# Patient Record
Sex: MALE | Race: WHITE | NOT HISPANIC OR LATINO | URBAN - METROPOLITAN AREA
[De-identification: names, ages, dates, MRNs, and addresses within clinical notes are randomized per-mention and may not be internally consistent; named-entity substitution may affect disease eponyms.]

---

## 2020-02-18 ENCOUNTER — INPATIENT (INPATIENT)
Facility: HOSPITAL | Age: 40
LOS: 20 days | Discharge: NURSING, REHAB OR LONG-TERM CARE | End: 2020-03-10
Attending: HOSPITALIST | Admitting: HOSPITALIST
Payer: MEDICAID

## 2020-02-18 ENCOUNTER — EMERGENCY (EMERGENCY)
Facility: HOSPITAL | Age: 40
LOS: 1 days | Discharge: ANOTHER HOSPITAL | End: 2020-02-18
Attending: EMERGENCY MEDICINE
Payer: MEDICAID

## 2020-02-18 VITALS
WEIGHT: 179.9 LBS | HEIGHT: 67 IN | HEART RATE: 78 BPM | DIASTOLIC BLOOD PRESSURE: 131 MMHG | RESPIRATION RATE: 18 BRPM | SYSTOLIC BLOOD PRESSURE: 157 MMHG | OXYGEN SATURATION: 99 %

## 2020-02-18 VITALS
HEART RATE: 84 BPM | OXYGEN SATURATION: 100 % | TEMPERATURE: 99 F | RESPIRATION RATE: 20 BRPM | SYSTOLIC BLOOD PRESSURE: 164 MMHG | DIASTOLIC BLOOD PRESSURE: 110 MMHG

## 2020-02-18 VITALS
SYSTOLIC BLOOD PRESSURE: 140 MMHG | HEART RATE: 97 BPM | TEMPERATURE: 99 F | RESPIRATION RATE: 18 BRPM | OXYGEN SATURATION: 100 % | WEIGHT: 189.82 LBS | DIASTOLIC BLOOD PRESSURE: 93 MMHG

## 2020-02-18 DIAGNOSIS — Z98.890 OTHER SPECIFIED POSTPROCEDURAL STATES: Chronic | ICD-10-CM

## 2020-02-18 DIAGNOSIS — I62.9 NONTRAUMATIC INTRACRANIAL HEMORRHAGE, UNSPECIFIED: ICD-10-CM

## 2020-02-18 LAB
ALBUMIN SERPL ELPH-MCNC: 3.9 G/DL — SIGNIFICANT CHANGE UP (ref 3.5–5)
ALBUMIN SERPL ELPH-MCNC: 4.9 G/DL — SIGNIFICANT CHANGE UP (ref 3.3–5)
ALP SERPL-CCNC: 126 U/L — HIGH (ref 40–120)
ALP SERPL-CCNC: 136 U/L — HIGH (ref 40–120)
ALT FLD-CCNC: 26 U/L — SIGNIFICANT CHANGE UP (ref 4–41)
ALT FLD-CCNC: 33 U/L DA — SIGNIFICANT CHANGE UP (ref 10–60)
ANION GAP SERPL CALC-SCNC: 15 MMO/L — HIGH (ref 7–14)
ANION GAP SERPL CALC-SCNC: 5 MMOL/L — SIGNIFICANT CHANGE UP (ref 5–17)
APAP SERPL-MCNC: < 15 UG/ML — LOW (ref 15–25)
APTT BLD: 34.8 SEC — SIGNIFICANT CHANGE UP (ref 27.5–36.3)
APTT BLD: 38.2 SEC — HIGH (ref 27.5–36.3)
AST SERPL-CCNC: 13 U/L — SIGNIFICANT CHANGE UP (ref 10–40)
AST SERPL-CCNC: 23 U/L — SIGNIFICANT CHANGE UP (ref 4–40)
BASE EXCESS BLDV CALC-SCNC: 2.7 MMOL/L — SIGNIFICANT CHANGE UP
BASOPHILS # BLD AUTO: 0.03 K/UL — SIGNIFICANT CHANGE UP (ref 0–0.2)
BASOPHILS # BLD AUTO: 0.03 K/UL — SIGNIFICANT CHANGE UP (ref 0–0.2)
BASOPHILS NFR BLD AUTO: 0.4 % — SIGNIFICANT CHANGE UP (ref 0–2)
BASOPHILS NFR BLD AUTO: 0.4 % — SIGNIFICANT CHANGE UP (ref 0–2)
BILIRUB SERPL-MCNC: 0.3 MG/DL — SIGNIFICANT CHANGE UP (ref 0.2–1.2)
BILIRUB SERPL-MCNC: 0.8 MG/DL — SIGNIFICANT CHANGE UP (ref 0.2–1.2)
BLOOD GAS VENOUS - CREATININE: 0.98 MG/DL — SIGNIFICANT CHANGE UP (ref 0.5–1.3)
BLOOD GAS VENOUS - FIO2: 21 — SIGNIFICANT CHANGE UP
BUN SERPL-MCNC: 12 MG/DL — SIGNIFICANT CHANGE UP (ref 7–18)
BUN SERPL-MCNC: 12 MG/DL — SIGNIFICANT CHANGE UP (ref 7–23)
CALCIUM SERPL-MCNC: 10.9 MG/DL — HIGH (ref 8.4–10.5)
CALCIUM SERPL-MCNC: 9.4 MG/DL — SIGNIFICANT CHANGE UP (ref 8.4–10.5)
CHLORIDE BLDV-SCNC: 105 MMOL/L — SIGNIFICANT CHANGE UP (ref 96–108)
CHLORIDE SERPL-SCNC: 101 MMOL/L — SIGNIFICANT CHANGE UP (ref 98–107)
CHLORIDE SERPL-SCNC: 107 MMOL/L — SIGNIFICANT CHANGE UP (ref 96–108)
CO2 SERPL-SCNC: 22 MMOL/L — SIGNIFICANT CHANGE UP (ref 22–31)
CO2 SERPL-SCNC: 28 MMOL/L — SIGNIFICANT CHANGE UP (ref 22–31)
CREAT SERPL-MCNC: 0.96 MG/DL — SIGNIFICANT CHANGE UP (ref 0.5–1.3)
CREAT SERPL-MCNC: 1.07 MG/DL — SIGNIFICANT CHANGE UP (ref 0.5–1.3)
EOSINOPHIL # BLD AUTO: 0.11 K/UL — SIGNIFICANT CHANGE UP (ref 0–0.5)
EOSINOPHIL # BLD AUTO: 0.18 K/UL — SIGNIFICANT CHANGE UP (ref 0–0.5)
EOSINOPHIL NFR BLD AUTO: 1.3 % — SIGNIFICANT CHANGE UP (ref 0–6)
EOSINOPHIL NFR BLD AUTO: 2.3 % — SIGNIFICANT CHANGE UP (ref 0–6)
ETHANOL BLD-MCNC: < 10 MG/DL — SIGNIFICANT CHANGE UP
GAS PNL BLDV: 140 MMOL/L — SIGNIFICANT CHANGE UP (ref 136–146)
GLUCOSE BLDV-MCNC: 113 MG/DL — HIGH (ref 70–99)
GLUCOSE SERPL-MCNC: 116 MG/DL — HIGH (ref 70–99)
GLUCOSE SERPL-MCNC: 99 MG/DL — SIGNIFICANT CHANGE UP (ref 70–99)
HBA1C BLD-MCNC: 4.9 % — SIGNIFICANT CHANGE UP (ref 4–5.6)
HCO3 BLDV-SCNC: 27 MMOL/L — SIGNIFICANT CHANGE UP (ref 20–27)
HCT VFR BLD CALC: 36.2 % — LOW (ref 39–50)
HCT VFR BLD CALC: 41.4 % — SIGNIFICANT CHANGE UP (ref 39–50)
HCT VFR BLDV CALC: 45.9 % — SIGNIFICANT CHANGE UP (ref 39–51)
HGB BLD-MCNC: 12.7 G/DL — LOW (ref 13–17)
HGB BLD-MCNC: 14.9 G/DL — SIGNIFICANT CHANGE UP (ref 13–17)
HGB BLDV-MCNC: 15 G/DL — SIGNIFICANT CHANGE UP (ref 13–17)
IMM GRANULOCYTES NFR BLD AUTO: 0.2 % — SIGNIFICANT CHANGE UP (ref 0–1.5)
IMM GRANULOCYTES NFR BLD AUTO: 0.8 % — SIGNIFICANT CHANGE UP (ref 0–1.5)
INR BLD: 0.98 — SIGNIFICANT CHANGE UP (ref 0.88–1.17)
INR BLD: 1.06 RATIO — SIGNIFICANT CHANGE UP (ref 0.88–1.16)
LACTATE BLDV-MCNC: 1.9 MMOL/L — SIGNIFICANT CHANGE UP (ref 0.5–2)
LYMPHOCYTES # BLD AUTO: 1.39 K/UL — SIGNIFICANT CHANGE UP (ref 1–3.3)
LYMPHOCYTES # BLD AUTO: 16.9 % — SIGNIFICANT CHANGE UP (ref 13–44)
LYMPHOCYTES # BLD AUTO: 2.09 K/UL — SIGNIFICANT CHANGE UP (ref 1–3.3)
LYMPHOCYTES # BLD AUTO: 26.8 % — SIGNIFICANT CHANGE UP (ref 13–44)
MCHC RBC-ENTMCNC: 30 PG — SIGNIFICANT CHANGE UP (ref 27–34)
MCHC RBC-ENTMCNC: 30 PG — SIGNIFICANT CHANGE UP (ref 27–34)
MCHC RBC-ENTMCNC: 35.1 GM/DL — SIGNIFICANT CHANGE UP (ref 32–36)
MCHC RBC-ENTMCNC: 36 % — SIGNIFICANT CHANGE UP (ref 32–36)
MCV RBC AUTO: 83.5 FL — SIGNIFICANT CHANGE UP (ref 80–100)
MCV RBC AUTO: 85.4 FL — SIGNIFICANT CHANGE UP (ref 80–100)
MONOCYTES # BLD AUTO: 0.28 K/UL — SIGNIFICANT CHANGE UP (ref 0–0.9)
MONOCYTES # BLD AUTO: 0.36 K/UL — SIGNIFICANT CHANGE UP (ref 0–0.9)
MONOCYTES NFR BLD AUTO: 3.4 % — SIGNIFICANT CHANGE UP (ref 2–14)
MONOCYTES NFR BLD AUTO: 4.6 % — SIGNIFICANT CHANGE UP (ref 2–14)
NEUTROPHILS # BLD AUTO: 5.07 K/UL — SIGNIFICANT CHANGE UP (ref 1.8–7.4)
NEUTROPHILS # BLD AUTO: 6.39 K/UL — SIGNIFICANT CHANGE UP (ref 1.8–7.4)
NEUTROPHILS NFR BLD AUTO: 65.1 % — SIGNIFICANT CHANGE UP (ref 43–77)
NEUTROPHILS NFR BLD AUTO: 77.8 % — HIGH (ref 43–77)
NRBC # BLD: 0 /100 WBCS — SIGNIFICANT CHANGE UP (ref 0–0)
NRBC # FLD: 0 K/UL — SIGNIFICANT CHANGE UP (ref 0–0)
PCO2 BLDV: 35 MMHG — LOW (ref 41–51)
PH BLDV: 7.48 PH — HIGH (ref 7.32–7.43)
PLATELET # BLD AUTO: 244 K/UL — SIGNIFICANT CHANGE UP (ref 150–400)
PLATELET # BLD AUTO: 264 K/UL — SIGNIFICANT CHANGE UP (ref 150–400)
PMV BLD: 10.1 FL — SIGNIFICANT CHANGE UP (ref 7–13)
PO2 BLDV: 74 MMHG — HIGH (ref 35–40)
POTASSIUM BLDV-SCNC: 4 MMOL/L — SIGNIFICANT CHANGE UP (ref 3.4–4.5)
POTASSIUM SERPL-MCNC: 3.7 MMOL/L — SIGNIFICANT CHANGE UP (ref 3.5–5.3)
POTASSIUM SERPL-MCNC: 3.9 MMOL/L — SIGNIFICANT CHANGE UP (ref 3.5–5.3)
POTASSIUM SERPL-SCNC: 3.7 MMOL/L — SIGNIFICANT CHANGE UP (ref 3.5–5.3)
POTASSIUM SERPL-SCNC: 3.9 MMOL/L — SIGNIFICANT CHANGE UP (ref 3.5–5.3)
PROT SERPL-MCNC: 7.8 G/DL — SIGNIFICANT CHANGE UP (ref 6–8.3)
PROT SERPL-MCNC: 8.6 G/DL — HIGH (ref 6–8.3)
PROTHROM AB SERPL-ACNC: 11.2 SEC — SIGNIFICANT CHANGE UP (ref 9.8–13.1)
PROTHROM AB SERPL-ACNC: 11.8 SEC — SIGNIFICANT CHANGE UP (ref 10–12.9)
RBC # BLD: 4.24 M/UL — SIGNIFICANT CHANGE UP (ref 4.2–5.8)
RBC # BLD: 4.96 M/UL — SIGNIFICANT CHANGE UP (ref 4.2–5.8)
RBC # FLD: 13.3 % — SIGNIFICANT CHANGE UP (ref 10.3–14.5)
RBC # FLD: 13.5 % — SIGNIFICANT CHANGE UP (ref 10.3–14.5)
SALICYLATES SERPL-MCNC: < 5 MG/DL — LOW (ref 15–30)
SAO2 % BLDV: 96 % — HIGH (ref 60–85)
SODIUM SERPL-SCNC: 138 MMOL/L — SIGNIFICANT CHANGE UP (ref 135–145)
SODIUM SERPL-SCNC: 140 MMOL/L — SIGNIFICANT CHANGE UP (ref 135–145)
TROPONIN I SERPL-MCNC: <0.015 NG/ML — SIGNIFICANT CHANGE UP (ref 0–0.04)
TSH SERPL-MCNC: 4.69 UIU/ML — HIGH (ref 0.27–4.2)
WBC # BLD: 7.79 K/UL — SIGNIFICANT CHANGE UP (ref 3.8–10.5)
WBC # BLD: 8.22 K/UL — SIGNIFICANT CHANGE UP (ref 3.8–10.5)
WBC # FLD AUTO: 7.79 K/UL — SIGNIFICANT CHANGE UP (ref 3.8–10.5)
WBC # FLD AUTO: 8.22 K/UL — SIGNIFICANT CHANGE UP (ref 3.8–10.5)

## 2020-02-18 PROCEDURE — 70496 CT ANGIOGRAPHY HEAD: CPT | Mod: 26

## 2020-02-18 PROCEDURE — 70496 CT ANGIOGRAPHY HEAD: CPT

## 2020-02-18 PROCEDURE — 31500 INSERT EMERGENCY AIRWAY: CPT

## 2020-02-18 PROCEDURE — 71045 X-RAY EXAM CHEST 1 VIEW: CPT

## 2020-02-18 PROCEDURE — 99292 CRITICAL CARE ADDL 30 MIN: CPT | Mod: 25

## 2020-02-18 PROCEDURE — 86900 BLOOD TYPING SEROLOGIC ABO: CPT

## 2020-02-18 PROCEDURE — 71045 X-RAY EXAM CHEST 1 VIEW: CPT | Mod: 26,77

## 2020-02-18 PROCEDURE — 86850 RBC ANTIBODY SCREEN: CPT

## 2020-02-18 PROCEDURE — 99222 1ST HOSP IP/OBS MODERATE 55: CPT

## 2020-02-18 PROCEDURE — 93005 ELECTROCARDIOGRAM TRACING: CPT

## 2020-02-18 PROCEDURE — 99291 CRITICAL CARE FIRST HOUR: CPT | Mod: 25

## 2020-02-18 PROCEDURE — 71045 X-RAY EXAM CHEST 1 VIEW: CPT | Mod: 26

## 2020-02-18 PROCEDURE — 85027 COMPLETE CBC AUTOMATED: CPT

## 2020-02-18 PROCEDURE — 36415 COLL VENOUS BLD VENIPUNCTURE: CPT

## 2020-02-18 PROCEDURE — 70498 CT ANGIOGRAPHY NECK: CPT | Mod: 26

## 2020-02-18 PROCEDURE — 99233 SBSQ HOSP IP/OBS HIGH 50: CPT

## 2020-02-18 PROCEDURE — 70450 CT HEAD/BRAIN W/O DYE: CPT | Mod: 26,59,77

## 2020-02-18 PROCEDURE — 70450 CT HEAD/BRAIN W/O DYE: CPT

## 2020-02-18 PROCEDURE — 86901 BLOOD TYPING SEROLOGIC RH(D): CPT

## 2020-02-18 PROCEDURE — 99291 CRITICAL CARE FIRST HOUR: CPT

## 2020-02-18 PROCEDURE — 84484 ASSAY OF TROPONIN QUANT: CPT

## 2020-02-18 PROCEDURE — 85610 PROTHROMBIN TIME: CPT

## 2020-02-18 PROCEDURE — 85730 THROMBOPLASTIN TIME PARTIAL: CPT

## 2020-02-18 PROCEDURE — 70498 CT ANGIOGRAPHY NECK: CPT

## 2020-02-18 PROCEDURE — 80053 COMPREHEN METABOLIC PANEL: CPT

## 2020-02-18 RX ORDER — SODIUM CHLORIDE 5 G/100ML
500 INJECTION, SOLUTION INTRAVENOUS
Refills: 0 | Status: DISCONTINUED | OUTPATIENT
Start: 2020-02-18 | End: 2020-02-21

## 2020-02-18 RX ORDER — NICARDIPINE HYDROCHLORIDE 30 MG/1
5 CAPSULE, EXTENDED RELEASE ORAL
Qty: 40 | Refills: 0 | Status: DISCONTINUED | OUTPATIENT
Start: 2020-02-18 | End: 2020-03-04

## 2020-02-18 RX ORDER — PROPOFOL 10 MG/ML
50 INJECTION, EMULSION INTRAVENOUS
Qty: 1000 | Refills: 0 | Status: DISCONTINUED | OUTPATIENT
Start: 2020-02-18 | End: 2020-02-19

## 2020-02-18 RX ORDER — CHLORHEXIDINE GLUCONATE 213 G/1000ML
1 SOLUTION TOPICAL
Refills: 0 | Status: DISCONTINUED | OUTPATIENT
Start: 2020-02-18 | End: 2020-02-24

## 2020-02-18 RX ORDER — PROPOFOL 10 MG/ML
40 INJECTION, EMULSION INTRAVENOUS
Qty: 1000 | Refills: 0 | Status: DISCONTINUED | OUTPATIENT
Start: 2020-02-18 | End: 2020-03-04

## 2020-02-18 RX ORDER — CHLORHEXIDINE GLUCONATE 213 G/1000ML
15 SOLUTION TOPICAL EVERY 12 HOURS
Refills: 0 | Status: DISCONTINUED | OUTPATIENT
Start: 2020-02-18 | End: 2020-02-20

## 2020-02-18 RX ORDER — LEVETIRACETAM 250 MG/1
1000 TABLET, FILM COATED ORAL ONCE
Refills: 0 | Status: COMPLETED | OUTPATIENT
Start: 2020-02-18 | End: 2020-02-18

## 2020-02-18 RX ORDER — NICARDIPINE HYDROCHLORIDE 30 MG/1
5 CAPSULE, EXTENDED RELEASE ORAL
Qty: 40 | Refills: 0 | Status: DISCONTINUED | OUTPATIENT
Start: 2020-02-18 | End: 2020-02-19

## 2020-02-18 RX ORDER — ETOMIDATE 2 MG/ML
20 INJECTION INTRAVENOUS ONCE
Refills: 0 | Status: COMPLETED | OUTPATIENT
Start: 2020-02-18 | End: 2020-02-18

## 2020-02-18 RX ORDER — PROPOFOL 10 MG/ML
40 INJECTION, EMULSION INTRAVENOUS
Qty: 500 | Refills: 0 | Status: DISCONTINUED | OUTPATIENT
Start: 2020-02-18 | End: 2020-02-18

## 2020-02-18 RX ORDER — LABETALOL HCL 100 MG
10 TABLET ORAL ONCE
Refills: 0 | Status: COMPLETED | OUTPATIENT
Start: 2020-02-18 | End: 2020-02-18

## 2020-02-18 RX ORDER — PROPOFOL 10 MG/ML
20 INJECTION, EMULSION INTRAVENOUS ONCE
Refills: 0 | Status: COMPLETED | OUTPATIENT
Start: 2020-02-18 | End: 2020-02-18

## 2020-02-18 RX ORDER — SUCCINYLCHOLINE CHLORIDE 100 MG/5ML
100 SYRINGE (ML) INTRAVENOUS ONCE
Refills: 0 | Status: COMPLETED | OUTPATIENT
Start: 2020-02-18 | End: 2020-02-18

## 2020-02-18 RX ORDER — FENTANYL CITRATE 50 UG/ML
100 INJECTION INTRAVENOUS ONCE
Refills: 0 | Status: DISCONTINUED | OUTPATIENT
Start: 2020-02-18 | End: 2020-02-18

## 2020-02-18 RX ORDER — PROPOFOL 10 MG/ML
40 INJECTION, EMULSION INTRAVENOUS
Qty: 1000 | Refills: 0 | Status: DISCONTINUED | OUTPATIENT
Start: 2020-02-18 | End: 2020-02-18

## 2020-02-18 RX ORDER — LEVETIRACETAM 250 MG/1
500 TABLET, FILM COATED ORAL EVERY 12 HOURS
Refills: 0 | Status: DISCONTINUED | OUTPATIENT
Start: 2020-02-18 | End: 2020-02-20

## 2020-02-18 RX ADMIN — NICARDIPINE HYDROCHLORIDE 25 MG/HR: 30 CAPSULE, EXTENDED RELEASE ORAL at 23:23

## 2020-02-18 RX ADMIN — PROPOFOL 20.22 MICROGRAM(S)/KG/MIN: 10 INJECTION, EMULSION INTRAVENOUS at 23:23

## 2020-02-18 RX ADMIN — PROPOFOL 20 MILLIGRAM(S): 10 INJECTION, EMULSION INTRAVENOUS at 16:29

## 2020-02-18 RX ADMIN — PROPOFOL 20.66 MICROGRAM(S)/KG/MIN: 10 INJECTION, EMULSION INTRAVENOUS at 19:46

## 2020-02-18 RX ADMIN — CHLORHEXIDINE GLUCONATE 15 MILLILITER(S): 213 SOLUTION TOPICAL at 23:24

## 2020-02-18 RX ADMIN — Medication 4 MILLIGRAM(S): at 15:45

## 2020-02-18 RX ADMIN — ETOMIDATE 20 MILLIGRAM(S): 2 INJECTION INTRAVENOUS at 15:22

## 2020-02-18 RX ADMIN — NICARDIPINE HYDROCHLORIDE 25 MG/HR: 30 CAPSULE, EXTENDED RELEASE ORAL at 19:46

## 2020-02-18 RX ADMIN — Medication 10 MILLIGRAM(S): at 15:19

## 2020-02-18 RX ADMIN — Medication 100 MILLIGRAM(S): at 15:24

## 2020-02-18 RX ADMIN — NICARDIPINE HYDROCHLORIDE 25 MG/HR: 30 CAPSULE, EXTENDED RELEASE ORAL at 16:02

## 2020-02-18 RX ADMIN — Medication 2 MILLIGRAM(S): at 15:42

## 2020-02-18 RX ADMIN — SODIUM CHLORIDE 30 MILLILITER(S): 5 INJECTION, SOLUTION INTRAVENOUS at 23:23

## 2020-02-18 RX ADMIN — LEVETIRACETAM 400 MILLIGRAM(S): 250 TABLET, FILM COATED ORAL at 16:29

## 2020-02-18 RX ADMIN — FENTANYL CITRATE 100 MICROGRAM(S): 50 INJECTION INTRAVENOUS at 15:37

## 2020-02-18 RX ADMIN — PROPOFOL 19.58 MICROGRAM(S)/KG/MIN: 10 INJECTION, EMULSION INTRAVENOUS at 15:50

## 2020-02-18 RX ADMIN — Medication 4 MILLIGRAM(S): at 16:28

## 2020-02-18 NOTE — H&P ADULT - ATTENDING COMMENTS
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  He was intubated at OSH 2/2 decreased LOC and ?seizure described by EMS.  Transferred to [facility] for vEEG and neuro eval.  Seen by neuro and NS, repeat head CT without change, admitted to MICU for BP control on nicardipine gtt to maintain sBP<160,, 3%NaCl to keep Na 145-150, and vent management.  He is on propofol and received Keppra. Neuro exam limited 2/2 sedation but PERRL, +gag  Lungs clear, Cor RRR s1s2, Abd soft   vent a/c 18/18, 450/30% +5  Pplat 17  CTA without arterial path    Acute intraparenchymal hemorrhage, ?seizure, intubated for airway protection, no expansion of bleed on f/u CT  - ABG on current vent settings, maintain lung protective vent settings, consider wean of sedation to assess neurologic status  - nicardipine to maintain sBP <160  - 3%NaCl to keep Na 145-150  q 4-6h BMP  - vEEG, continue keppra  - f/u CT at 24hours  - try to obtain records from [place] re: cva and w/u  very guarded  cc time 45 min excluding time spent on procedures and POCUS

## 2020-02-18 NOTE — H&P ADULT - HISTORY OF PRESENT ILLNESS
41 yo M c PMH of HTN and ?ICH in September 2019 (with residual LUE and LLE weakness) transferred from  where he presented with AMS and unresponsiveness and was intubated, pt also had 39 yo M c PMH of HTN and ?ICH in September 2019 (with residual LUE and LLE weakness) transferred from  where he presented with AMS and unresponsiveness. per father, pt was his normal self (A&Ox3, talking normally, had LUE and LLE weakness that was at his baseline) all morning aside from having a HA this morning. At 1:15PM, pt told his father he had severe dizziness, then pt could not move his LUE and LLE at all (worse than baseline), then lost consciousness. father denies seizure activity. LKN 1:10PM today. At , code stroke was called on arrival, NIHSS 41, pt was obtunded and intubated. CT showed: "acute parenchymal hematoma in the left thalamus. It measures approximately 1.5 cm in diameter with mild associated vasogenic edema. There is mild left-sided intraventricular hemorrhage. There is no ventricular trapping at this time. There is moderate to advanced right basal ganglia and to a laminectomy encephalomalacia consistent with previous infarct or hemorrhage with an associated craniotomy defect." no tPA given. pt's obtunded state was OOP to extent of his bleed and per  note pt had LLE shaking, so seizure was presumed and pt loaded with keppra, given ativan, started on propofol drip, and sent to [facility] ED. not on bloodthinners. no recent travel/ill contacts. Father states pt had intracranial bleeding in September and pt had neurosurgery but father isn't sure what kind. no FH of kidney disease, ICH, SLE.      meds given: 20 etomidate, 100 mcg fentanyl, labetalol 10 mg, nicardipine drip, keppra 1000 mg, ativan 10 mg, propofol drip, succinylcholine  in [facility] ED, pt assessed by neurosurgery and neuro, repeat CTH was relatively stable, so pt cleared for admission to MICU. was continued on propofol and nicardipine.     home meds:  labetalol 200 mg BID  amitriptyline HCl 50 mg q day  amlodipine 10 mg q day  lipitor 10 mg q day 41 yo M c PMH of HTN and ?ICH in September 2019 (with residual LUE and LLE weakness) transferred from  where he presented with AMS and unresponsiveness. per father, pt was his normal self (A&Ox3, talking normally, had LUE and LLE weakness that was at his baseline) all morning aside from having a HA this morning. At 1:15PM, pt told his father he had severe dizziness, then pt could not move his LUE and LLE at all (worse than baseline), then lost consciousness. father denies seizure activity. LKN 1:10PM today. At , code stroke was called on arrival, NIHSS 41, pt was obtunded and intubated. CT showed: "acute parenchymal hematoma in the left thalamus. It measures approximately 1.5 cm in diameter with mild associated vasogenic edema. There is mild left-sided intraventricular hemorrhage. There is no ventricular trapping at this time. There is moderate to advanced right basal ganglia and to a laminectomy encephalomalacia consistent with previous infarct or hemorrhage with an associated craniotomy defect." CTA head and neck showed "normal arterial findings." no tPA given. pt's obtunded state was OOP to extent of his bleed and per  note pt had LLE shaking, so seizure was presumed and pt loaded with keppra, given ativan, started on propofol drip, and sent to [facility] ED. not on bloodthinners. no recent travel/ill contacts. Father states pt had intracranial bleeding in September and pt had neurosurgery but father isn't sure what kind. [facility]. no FH of kidney disease, ICH, SLE.      meds given: 20 etomidate, 100 mcg fentanyl, labetalol 10 mg, nicardipine drip, keppra 1000 mg, ativan 10 mg, propofol drip, succinylcholine  in [facility] ED, pt assessed by neurosurgery and neuro, repeat CTH was relatively stable, so pt cleared for admission to MICU. was continued on propofol and nicardipine.     home meds:  labetalol 200 mg BID  amitriptyline HCl 50 mg q day  amlodipine 10 mg q day  lipitor 10 mg q day

## 2020-02-18 NOTE — H&P ADULT - ASSESSMENT
41 yo M c PMH of HTN and ?ICH in September 2019 (with residual LUE and LLE weakness) transferred from  where he presented with AMS and unresponsiveness, that was preceded by dizziness, HA, and LUE and LLE weakness. code stroke called which found ICH, pt intubated also had LLE shaking so was loaded with keppra and started on propofol, and nicardipine and transferred. repeat CTH in [facility] ED was stable.     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 E(1) V(NT) M(4) on sedation. +gag reflex.   - repeat 24h CTH  - keppra 500 mg bid  - Strict BP control, Goal SBP <160  - q1h neuro checks  - Goal Na 145-150  -  video EEG  - MRI brain w, w/o when able  - obtain hx of prior stroke (not in system)- if unable to obtain will need TTE with bubble, hypercoaguable workup, venous dopplers of the lower extremities.   - f/u neuro and neurosurgery recs     CARDIAC: h/o HTN.   - c/w cardine drip goal SBP < 160    RESPIRATORY: ETT (confirmed on [facility] CXR)  - c/w vent    GI: no acute issues    ENDO: no acute issues  - SSI  - f/u A1c     RENAL:   - Goal Na 145-150    HEME/ONC;   - hold chemical dvt ppx for now     PPX: SCDs 41 yo M c PMH of HTN and ?ICH in September 2019 (with residual LUE and LLE weakness) transferred from  where he presented with AMS and unresponsiveness, that was preceded by dizziness, HA, and LUE and LLE weakness. code stroke called which found ICH, pt intubated also had LLE shaking so was loaded with keppra and started on propofol, and nicardipine and transferred. repeat CTH in [facility] ED was stable.     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 E(1) V(NT) M(4) on sedation. +gag reflex.   - repeat 24h CTH  - elevated HOB 30 degrees   - keppra 500 mg bid  - Strict BP control, Goal SBP <160  - q1h neuro checks  - Goal Na 145-150  -  video EEG  - MRI brain w, w/o when able  - obtain hx of prior stroke from family/?[place] (not in system)- if unable to obtain will need TTE with bubble, hypercoaguable workup, venous dopplers of the lower extremities.   - f/u neuro and neurosurgery recs     CARDIAC: h/o HTN.   - c/w cardine drip goal SBP < 160    RESPIRATORY: ETT (confirmed on [facility] CXR)  - c/w vent    GI: no acute issues    ENDO: no acute issues  - SSI  - f/u A1c     RENAL: Goal Na 145-150  - hypertonic saline 3% started at 30 cc/hr for Na 138  - BMP q4h    HEME/ONC:  - hold chemical dvt ppx for now     PPX: SCDs

## 2020-02-18 NOTE — CONSULT NOTE ADULT - SUBJECTIVE AND OBJECTIVE BOX
HPI:  [facility] MRN: [id number]  History obtained from father at bedside, pt is intubated and sedated on propofol  Patient is a 40 year old male with PMHx of prior R BG infarct (with R on CT head at [facility]) who presents as a transfer from [facility] for L thalamic bleed (approx 1.5 cm on 2 pm 2/18 scan with vasogenic edema) with IVH, CTA H&N unremarkable. He has a persistent L sided deficit at baseline, and lives with father. On day of presentation father noted that pt was not talking and became obtunded 5 min before EMS arrived.     General:  Limited exam due to intubation and sedation  CN: pupils 2-3mm, sluggishly reactive, EOM - VOR intact, gag intact,     LABORATORY:  CBC   Chem       LFTs   Coagulopathy   Lipid Panel   A1c   Cardiac enzymes     U/A HPI:  [facility] MRN: [id number]  History obtained from father at bedside, pt is intubated and sedated on propofol  Patient is a 40 year old male with PMHx of prior R BG infarct (with R on CT head at [facility]), HTN who presents as a transfer from [facility] for L thalamic bleed (approx 1.5 cm on 2 pm 2/18 scan with vasogenic edema) with IVH, CTA H&N unremarkable. He has a persistent L sided deficit at baseline, and lives with father. On day of presentation father noted that pt was not talking and became obtunded 5 min before EMS arrived. Prior to arrival to [facility], he was witnessed to have 1 GTC, and was intubated after. He is not on AC or antiplt, and home meds include lisinopril, labetalol and amlodipine. No recent illnesses, no trauma. He was transferred to [facility] (no bed availability at [facility]) for vEEG and further management  ICH score 3, MRS unknown.     General:  Limited exam due to intubation and sedation  CN: pupils 2-3mm, sluggishly reactive, EOM - VOR intact, gag intact, no corneals  Motor: LUE decerebrate posturing, RUE extension, RLE, LLE both intermittently withdrawing, with LLE tremor appreciated only with painful stim (pinch under L knee), otherwise no shaking noted.   R upgoing toe, L toe mute    Labs: pending

## 2020-02-18 NOTE — ED PROVIDER NOTE - UNABLE TO OBTAIN
A complete HPI is unable to be obtained secondary to the patient's underlying condition Severe Illness/Injury A complete ROS is unable to be obtained secondary to the patient's underlying condition

## 2020-02-18 NOTE — ED PROCEDURE NOTE - CPROC ED INFORMED CONSENT1
How Severe Is Your Skin Lesion?: mild
Has Your Skin Lesion Been Treated?: not been treated
Is This A New Presentation, Or A Follow-Up?: Skin Lesion
obtained from patient's Father/Benefits, risks, and possible complications of procedure explained to patient/caregiver who verbalized understanding and gave verbal consent.

## 2020-02-18 NOTE — ED PROVIDER NOTE - PROGRESS NOTE DETAILS
Patient was intubated for expected course and airway management. On review of CT scan, the size of the bleed would not represent cause for the patient being this obtunded. I suspect the seizures are also going on. Patient was noted to have left leg shaking prior to intubation. I spent 22 on the line with the transfer center and discussed case with Dr. Proctor at [facility]. He states that there is a bed limitation at [facility] and it will be difficult to transfer the patient there. Dr. Proctor conferenced in with Dr. rGay at [facility] ICU who is currently requesting we keep the paitent in the ER here. Dr. Proctor will attempt to discuss the case with neurosurgery at [facility] as he believes the patient will benefit from continuous ECG monitoring and further management. Awaiting phone call back from transfer center. Pt currently remains obtunded, not moving 4 extremities, his gaze changed now to the left and cough on tracheal tube. Will repeat CT scan if have enough time. I suspect the bleed is expanding. Patient was intubated for expected course and airway management. On review of CT scan, the size of the bleed would not represent cause for the patient being this obtunded. I suspect the seizures are also going on. Patient was noted to have left leg shaking prior to intubation. I spent 22 on the line with the transfer center and discussed case with Dr. Proctor at [facility]. He states that there is a bed limitation at [facility] and it will be difficult to transfer the patient there. Dr. Proctor conferenced in with Dr. Gray at [facility] ICU who is currently requesting we keep the paitent in the ER here. Dr. Proctor will attempt to discuss the case with neurosurgery at [facility] as he believes the patient will benefit from continuous EEG monitoring and further management. Awaiting phone call back from transfer center. Pt currently remains obtunded, not moving 4 extremities, his gaze changed now to the left and cough on tracheal tube. Will repeat CT scan if have enough time. I suspect the bleed is expanding. Pt is accepted to transfer to [facility] ER to Dr. Bautista Patient with whole body tremors - given that we still think seizure, makes it more likely. Keppra and Ativan to be administered.

## 2020-02-18 NOTE — CONSULT NOTE ADULT - SUBJECTIVE AND OBJECTIVE BOX
HPI: 40 year old     PAST MEDICAL & SURGICAL HISTORY:    FAMILY HISTORY:    Home Medications:      MEDICATIONS  (STANDING):  chlorhexidine 0.12% Liquid 15 milliLiter(s) Oral Mucosa every 12 hours    MEDICATIONS  (PRN):    Vital Signs Last 24 Hrs  T(C): --  T(F): --  HR: --  BP: --  BP(mean): --  RR: --  SpO2: --    PHYSICAL EXAM:  Intubated     LABS:                RADIOLOGY: HPI:   39 yo male with PMhx of stroke with persistent left sided deficit but otherwise at normal baseline, lives with father and accompanied by father, BIBA, Patient's father noticed that patient was not talking and obtunded that occurred 5 minutes before EMS was called.   Transfeered from [place], intubated there    PAST MEDICAL & SURGICAL HISTORY:  NOTED TO HAVE RIGHT PARITAL CRANIOTOMY     FAMILY HISTORY:    Home Medications:      MEDICATIONS  (STANDING):  chlorhexidine 0.12% Liquid 15 milliLiter(s) Oral Mucosa every 12 hours    MEDICATIONS  (PRN):    Vital Signs Last 24 Hrs  T(C): --  T(F): --  HR: --  BP: --  BP(mean): --  RR: --  SpO2: --      PHYSICAL EXAM:  Intubated     LABS:                RADIOLOGY:      EXAM:  CT ANGIO NECK (W)AW IC                          EXAM:  CT ANGIO BRAIN (W)AW IC                            PROCEDURE DATE:  02/18/2020          INTERPRETATION:  CT angiogram head and neck with contrast    History intracranial hemorrhage    Contrast Omnipaque 90 cc; 10 cc discarded    Multiplanar MIPS included    Examination of the intracranial circulation is negative for large vessel occlusion or aneurysm. There are no abnormal draining or feeding vessels associated with the parenchymal hematoma noted in the left thalamus.    Examination of the cervical circulation is negative for carotid or vertebral artery stenosis, occlusion or dissection. The left vertebral artery is anatomically dominant.    IMPRESSION:    Normal arterial findings HPI:   39 yo male with PMhx of stroke with persistent left sided deficit but otherwise at normal baseline, lives with father and accompanied by father, BIBA, Patient's father noticed that patient was not talking and obtunded that occurred 5 minutes before EMS was called.   Transfeered from [place], intubated there    PAST MEDICAL & SURGICAL HISTORY:  NOTED TO HAVE RIGHT PARITAL CRANIOTOMY     FAMILY HISTORY:    Home Medications:      MEDICATIONS  (STANDING):  chlorhexidine 0.12% Liquid 15 milliLiter(s) Oral Mucosa every 12 hours    MEDICATIONS  (PRN):    Vital Signs Last 24 Hrs  T(C): --  T(F): --  HR: --  BP: --  BP(mean): --  RR: --  SpO2: --      PHYSICAL EXAM:  Intubated     LABS:                RADIOLOGY:  EXAM:  CT BRAIN STROKE PROTOCOL                            PROCEDURE DATE:  02/18/2020          INTERPRETATION:  CT brain without contrast    History CVA    There are no relevant prior studies for comparison. There is an acute parenchymal hematoma in the left thalamus. It measures approximately 1.5 cm in diameter with mild associated vasogenic edema. There is mild left-sided intraventricular hemorrhage. There is no ventricular trapping at this time. There is moderate to advanced right basal ganglia and to a laminectomy encephalomalacia consistent with previous infarct or hemorrhage with an associated craniotomy defect.    IMPRESSION:    Small acute parenchymal hematoma left thalamus and cyst with hemorrhagic lacunar infarct.    Findings discussed with Dr. Hines      EXAM:  CT ANGIO NECK (W)AW IC                          EXAM:  CT ANGIO BRAIN (W)AW IC                            PROCEDURE DATE:  02/18/2020          INTERPRETATION:  CT angiogram head and neck with contrast    History intracranial hemorrhage    Contrast Omnipaque 90 cc; 10 cc discarded    Multiplanar MIPS included    Examination of the intracranial circulation is negative for large vessel occlusion or aneurysm. There are no abnormal draining or feeding vessels associated with the parenchymal hematoma noted in the left thalamus.    Examination of the cervical circulation is negative for carotid or vertebral artery stenosis, occlusion or dissection. The left vertebral artery is anatomically dominant.    IMPRESSION:    Normal arterial findings HPI:   39 yo male with PMhx of stroke with persistent left sided deficit but otherwise at normal baseline, lives with father and accompanied by father, BIBA, Patient's father noticed that patient was not talking and obtunded that occurred 5 minutes before EMS was called.   Transfeered from [place], intubated there    PAST MEDICAL & SURGICAL HISTORY:  NOTED TO HAVE RIGHT PARITAL CRANIOTOMY     FAMILY HISTORY:    Home Medications:      MEDICATIONS  (STANDING):  chlorhexidine 0.12% Liquid 15 milliLiter(s) Oral Mucosa every 12 hours    MEDICATIONS  (PRN):    Vital Signs Last 24 Hrs  T(C): --  T(F): --  HR: --  BP: --  BP(mean): --  RR: --  SpO2: --      PHYSICAL EXAM:  Intubated   Pupild 2mm sluggish + corneal  ? Posutring UE  withdrawing LE  Not following commands    LABS:          RADIOLOGY:  EXAM:  CT BRAIN STROKE PROTOCOL                            PROCEDURE DATE:  02/18/2020          INTERPRETATION:  CT brain without contrast    History CVA    There are no relevant prior studies for comparison. There is an acute parenchymal hematoma in the left thalamus. It measures approximately 1.5 cm in diameter with mild associated vasogenic edema. There is mild left-sided intraventricular hemorrhage. There is no ventricular trapping at this time. There is moderate to advanced right basal ganglia and to a laminectomy encephalomalacia consistent with previous infarct or hemorrhage with an associated craniotomy defect.    IMPRESSION:    Small acute parenchymal hematoma left thalamus and cyst with hemorrhagic lacunar infarct.    Findings discussed with Dr. Hines      EXAM:  CT ANGIO NECK (W)AW IC                          EXAM:  CT ANGIO BRAIN (W)AW IC                            PROCEDURE DATE:  02/18/2020          INTERPRETATION:  CT angiogram head and neck with contrast    History intracranial hemorrhage    Contrast Omnipaque 90 cc; 10 cc discarded    Multiplanar MIPS included    Examination of the intracranial circulation is negative for large vessel occlusion or aneurysm. There are no abnormal draining or feeding vessels associated with the parenchymal hematoma noted in the left thalamus.    Examination of the cervical circulation is negative for carotid or vertebral artery stenosis, occlusion or dissection. The left vertebral artery is anatomically dominant.    IMPRESSION:    Normal arterial findings

## 2020-02-18 NOTE — ED PROVIDER NOTE - OBJECTIVE STATEMENT
39 yo male with PMhx of stroke with persistent left sided deficit but otherwise at normal baseline, lives with father and accompanied by father, BIBA, Patient's father noticed that patient was not talking and obtunded that occurred 5 minutes before EMS was called. The story is received from the patient's father who is Romanian speaking - EMS presents to translate due to urgency of care.

## 2020-02-18 NOTE — ED ADULT NURSE NOTE - NSIMPLEMENTINTERV_GEN_ALL_ED
Implemented All Fall with Harm Risk Interventions:  [place] to call system. Call bell, personal items and telephone within reach. Instruct patient to call for assistance. Room bathroom lighting operational. Non-slip footwear when patient is off stretcher. Physically safe environment: no spills, clutter or unnecessary equipment. Stretcher in lowest position, wheels locked, appropriate side rails in place. Provide visual cue, wrist band, yellow gown, etc. Monitor gait and stability. Monitor for mental status changes and reorient to person, place, and time. Review medications for side effects contributing to fall risk. Reinforce activity limits and safety measures with patient and family. Provide visual clues: red socks.

## 2020-02-18 NOTE — ED PROVIDER NOTE - ATTENDING CONTRIBUTION TO CARE
40m w hx CVA w residual left-sided weakness xfer'd from [place] for intracranial hemorrhage, specifically thalamic bleed w/ hemorrhagic lacunar infarct. Pt presented to OSH unresponsive w LKW 13:55. Was non-verbal and not moving extremities w/ reported decorticate posturing and was intubated for airway protection. During stay had witnessed GTC activity that was treated w 2 ativan pushes, Keppra and was then placed on propofol gtt w resolution. Was also placed on cardene drip for elevated bp. On exam: Patient intubated and vented with good breath sounds bilaterally, stable VS as noted. Responds to painful stimuli with withdrawal on R and posturing on left. Pupils 4mm bilat and reactive. Lungs clear heart sounds normal abdomen soft skin normal. IMP: Intracranial hemorrhage, Seizure, Intubated, BP control, sz treated, on propofol drip, 40m w hx CVA w residual left-sided weakness xfer'd from [place] for intracranial hemorrhage, specifically thalamic bleed w/ hemorrhagic lacunar infarct. Pt presented to OSH unresponsive w LKW 13:55. Was non-verbal and not moving extremities w/ reported decorticate posturing and was intubated for airway protection. During stay had witnessed GTC activity that was treated w 2 ativan pushes, Keppra and was then placed on propofol gtt w resolution. Was also placed on cardene drip for elevated bp. On exam: Patient intubated and vented with good breath sounds bilaterally, stable VS as noted. Responds to painful stimuli with withdrawal on R and posturing on left. Pupils 4mm bilat and reactive. Lungs clear heart sounds normal abdomen soft skin normal. IMP: Intracranial hemorrhage, Seizure, Intubated, BP control, sz treated, on propofol drip, Plan: Tube position reconfirmed, repeat labs, consulted Neuro, Neurosurg, MICU, repeat CT done per consultants. Admit MICU

## 2020-02-18 NOTE — ED PROVIDER NOTE - CONSTITUTIONAL, MLM
normal... eyes open, gazing to right, left arm clawing eyes open, gazing to right, left arm clawing. +distress

## 2020-02-18 NOTE — ED ADULT NURSE NOTE - OBJECTIVE STATEMENT
Pt BIBEMS c/o of nonverbal, unable to move all extremities since 1355, pt is alert awake flat effect no acute respiratory distress noted at this time see code stroke flow sheet

## 2020-02-18 NOTE — CONSULT NOTE ADULT - ASSESSMENT
40 year old male with PMHx of prior R BG infarct (with R on CT head at [facility]), HTN who presents as a transfer from [facility] for L thalamic bleed (approx 1.5 cm on 2 pm 2/18 scan with vasogenic edema) with IVH, CTA H&N unremarkable. He has a persistent L sided deficit at baseline, and lives with father. On day of presentation father noted that pt was not talking and became obtunded 5 min before EMS arrived. Prior to arrival to [facility], he was witnessed to have 1 GTC, and was intubated after. He is not on AC or antiplt, and home meds include lisinopril, labetalol and amlodipine. No recent illnesses, no trauma. He was transferred to [facility] (no bed availability at [facility]) for vEEG and further management  ICH score 3, MRS unknown.   Exam limited due to sedation on propofol, no over seizing noted, LLE shaking noted only with painful stim.   Impression: L thalamic IPH with IVH of unknown etiology, hypertensive vs malignancy vs other.     Plan  [] repeat CTh now given clinically poor exam in comparison to radiological findings, and repeat again in 24 hours  [] MRI brain w, w/o when able  [] q1 neuro checks  [] SBP goal < 160  [] keep hypernatremic, Na 145-155  [] for possible seizure - would continue propofol and start keppra 500 BID, and connect patient to video EEG monitoring  [] DVT ppx with venodynes, hold all blood thinners.   [] obtain hx of prior stroke (not in system)  [] stroke core measures: please send hemoglobin A1C and lipid profile 40 year old male with PMHx of prior R BG infarct (with R on CT head at [facility]), HTN who presents as a transfer from [facility] for L thalamic bleed (approx 1.5 cm on 2 pm 2/18 scan with vasogenic edema) with IVH, CTA H&N unremarkable. He has a persistent L sided deficit at baseline, and lives with father. On day of presentation father noted that pt was not talking and became obtunded 5 min before EMS arrived. Prior to arrival to [facility], he was witnessed to have 1 GTC, and was intubated after. He is not on AC or antiplt, and home meds include lisinopril, labetalol and amlodipine. No recent illnesses, no trauma. He was transferred to [facility] (no bed availability at [facility]) for vEEG and further management  ICH score 3, MRS unknown.   Exam limited due to sedation on propofol, no over seizing noted, LLE shaking noted only with painful stim.   Impression: L thalamic IPH with IVH of unknown etiology, hypertensive vs malignancy vs other.     Plan  [] repeat CTh now given clinically poor exam in comparison to radiological findings, and repeat again in 24 hours  [] MRI brain w, w/o when able  [] q1 neuro checks  [] SBP goal < 160  [] keep hypernatremic, Na 145-155  [] for possible seizure - would continue propofol and start keppra 500 BID, and connect patient to video EEG monitoring  [] DVT ppx with venodynes, hold all blood thinners.   [] obtain hx of prior stroke (not in system)- if unable to obtain will need TTE with bubble, hypercoaguable workup, venous dopplers of the lower extremities.   [] stroke core measures: please send hemoglobin A1C and lipid profile 40 year old male with PMHx of prior R BG infarct (with R on CT head at [facility]), HTN who presents as a transfer from [facility] for L thalamic bleed (approx 1.5 cm on 2 pm 2/18 scan with vasogenic edema) with IVH, CTA H&N unremarkable. He has a persistent L sided deficit at baseline, and lives with father. On day of presentation father noted that pt was not talking and became obtunded 5 min before EMS arrived. Prior to arrival to [facility], he was witnessed to have 1 GTC, and was intubated after. He is not on AC or antiplt, and home meds include lisinopril, labetalol and amlodipine. No recent illnesses, no trauma. He was transferred to [facility] (no bed availability at [facility]) for vEEG and further management  ICH score 3, MRS unknown.   Exam limited due to sedation on propofol, no over seizing noted, LLE shaking noted only with painful stim.   Impression: L thalamic IPH with IVH of unknown etiology, hypertensive vs malignancy vs other.     Plan:    [] repeat CTh now given clinically poor exam in comparison to radiological findings, and repeat again in 24 hours  [] MRI brain w, w/o when able  [] q1 neuro checks  [] SBP goal < 160  [] keep hypernatremic, Na 145-155  [] for possible seizure - would continue propofol and start keppra 500 BID, and connect patient to video EEG monitoring  [] DVT ppx with venodynes, hold all blood thinners.   [] obtain hx of prior stroke (not in system)- if unable to obtain will need TTE with bubble, hypercoaguable workup, venous dopplers of the lower extremities.   [] stroke core measures: please send hemoglobin A1C and lipid profile

## 2020-02-18 NOTE — ED PROVIDER NOTE - CLINICAL SUMMARY MEDICAL DECISION MAKING FREE TEXT BOX
39 yo male presents unresponsive found to have intracranial hemorrhage. It is unclear why the pt is not responsive given the size of the intracranial hemorrhage. Code stroke was initially called but cancelled due to the intracranial hemorrhage finding. Patient is not a candidate for TPA. No telestroke. I suspect that there is seizure activity as the patient is having left leg shaking. Patient was given Ativan bolus and intubation for airway protection. As I suspect seizure like activity and we do not have continuous EEG monitoring here 24/7, I will discuss case with neuro surgery regarding transfer.

## 2020-02-18 NOTE — ED PROVIDER NOTE - PROGRESS NOTE DETAILS
Elizabeth Goldberger PGY-3: CTH stable per radiology. Called nsgy, state nothing to do from their perspective and no plan for xfer to [facility] on their end. Spoke w/ neuro who recommend rpt CTH in 24h and admission here. Will rediscuss w MICU

## 2020-02-18 NOTE — ED ADULT NURSE NOTE - OBJECTIVE STATEMENT
Mobile Critical Care RN:.. 41 y/o male received in trc h/o CVA with L sided weakness p/w AMS Tx from OSH, intubated for lethargy and airway protection at osh.  CT scan at [facility] showed a 1.5 cm left thalamic hemorrhage with extension of blood into left lateral ventricle.  Per EMS pt with "grand mal" seizure during txf.  Pt arrives on Nicardipine 5mg and propofol 40mcg.  18 g lac 20g l hand 8.0 tube @22 lip.  14fr duke inserted  rpt to prim rn

## 2020-02-18 NOTE — ED PROVIDER NOTE - CLINICAL SUMMARY MEDICAL DECISION MAKING FREE TEXT BOX
40m w hx CVA w residual left-sided weakness xfer'd from [place] for intracranial hemorrhage, specifically thalamic bleed w/ hemorrhagic lacunar infarct. Pt intubated and sedated on arrival, bp 150s/100s. Neuro exam w sluggish but reactive pupils. Plan for rpt labs, cxr to confirm ETT, neuro and neurosurg cs, titrate bp 140/100, likely admit to [facility]

## 2020-02-18 NOTE — H&P ADULT - NSHPPHYSICALEXAM_GEN_ALL_CORE
General: intubated and sedated  HEENT: intubated  CARDIAC: RRR, nl S1, S2, no S3, S4, no murmurs/rubs/gallops, no BLE edema  PULMONARY: CTAB  GI: abdomen soft NDNT  SKIN: warm and dry  PSYCH: intubated and sedated  NEURO: E(1) V(NT) M(4) on sedation. +gag reflex

## 2020-02-18 NOTE — H&P ADULT - NSHPREVIEWOFSYSTEMS_GEN_ALL_CORE
ROS+: HA, dizziness, unrepsonsiveness, LUE and LLE weakness  ROS negative: f/c, congestion, coryza, cough, vision changes, CP, SOB, abdominal pain, n/v/d, dysuria, hematuria, leg edema, slurred speech

## 2020-02-18 NOTE — ED PROVIDER NOTE - OBJECTIVE STATEMENT
40m w hx CVA w residual left-sided weakness xfer'd from [place] for intracranial hemorrhage, specifically thalamic bleed w/ hemorrhagic lacunar infarct. Pt presented to OSH unresponsive w LKW 13:55. Was non-verbal and not moving extremities w/ reported decorticate posturing and was intubated for airway protection. During stay had witnessed GTC activity that was treated w 2 ativan helen Keppra and was then placed on propofol gtt w resolution. Was also placed on cardene drip for elevated bp. 40m w hx CVA w residual left-sided weakness xfer'd from [place] for intracranial hemorrhage, specifically thalamic bleed w/ hemorrhagic lacunar infarct. Pt presented to OSH unresponsive w LKW 13:55. Was non-verbal and not moving extremities w/ reported decorticate posturing and was intubated for airway protection. During stay had witnessed GTC activity that was treated w 2 ativan pushcarolyn, Keppra and was then placed on propofol gtt w resolution. Was also placed on cardene drip for elevated bp. History from EMS and [facility]

## 2020-02-18 NOTE — H&P ADULT - NSHPLABSRESULTS_GEN_ALL_CORE
.  LABS:                         14.9   8.22  )-----------( 264      ( 18 Feb 2020 18:20 )             41.4     02-18    138  |  101  |  12  ----------------------------<  116<H>  3.9   |  22  |  0.96    Ca    10.9<H>      18 Feb 2020 18:20    TPro  8.6<H>  /  Alb  4.9  /  TBili  0.8  /  DBili  x   /  AST  23  /  ALT  26  /  AlkPhos  136<H>  02-18    PT/INR - ( 18 Feb 2020 18:20 )   PT: 11.2 SEC;   INR: 0.98          PTT - ( 18 Feb 2020 18:20 )  PTT:38.2 SEC          RADIOLOGY, EKG & ADDITIONAL TESTS: Reviewed.

## 2020-02-18 NOTE — ED PROVIDER NOTE - NSRISKOFTRANSFER_ED_A_ED
Deterioration of Condition En Route/Death or Disability/Transportation Risk (There is always a risk of traffic delays resulting in deterioration of condition.)/Increased Pain

## 2020-02-19 LAB
AMPHET UR-MCNC: NEGATIVE — SIGNIFICANT CHANGE UP
ANION GAP SERPL CALC-SCNC: 12 MMO/L — SIGNIFICANT CHANGE UP (ref 7–14)
ANION GAP SERPL CALC-SCNC: 14 MMO/L — SIGNIFICANT CHANGE UP (ref 7–14)
ANION GAP SERPL CALC-SCNC: 14 MMO/L — SIGNIFICANT CHANGE UP (ref 7–14)
APPEARANCE UR: CLEAR — SIGNIFICANT CHANGE UP
BACTERIA # UR AUTO: NEGATIVE — SIGNIFICANT CHANGE UP
BARBITURATES UR SCN-MCNC: NEGATIVE — SIGNIFICANT CHANGE UP
BASE EXCESS BLDA CALC-SCNC: 1.4 MMOL/L — SIGNIFICANT CHANGE UP
BASE EXCESS BLDA CALC-SCNC: 1.5 MMOL/L — SIGNIFICANT CHANGE UP
BENZODIAZ UR-MCNC: NEGATIVE — SIGNIFICANT CHANGE UP
BILIRUB UR-MCNC: NEGATIVE — SIGNIFICANT CHANGE UP
BLOOD GAS ARTERIAL - FIO2: 21 — SIGNIFICANT CHANGE UP
BLOOD GAS ARTERIAL - FIO2: 30 — SIGNIFICANT CHANGE UP
BLOOD UR QL VISUAL: NEGATIVE — SIGNIFICANT CHANGE UP
BUN SERPL-MCNC: 12 MG/DL — SIGNIFICANT CHANGE UP (ref 7–23)
BUN SERPL-MCNC: 12 MG/DL — SIGNIFICANT CHANGE UP (ref 7–23)
BUN SERPL-MCNC: 13 MG/DL — SIGNIFICANT CHANGE UP (ref 7–23)
CALCIUM SERPL-MCNC: 10.1 MG/DL — SIGNIFICANT CHANGE UP (ref 8.4–10.5)
CALCIUM SERPL-MCNC: 10.6 MG/DL — HIGH (ref 8.4–10.5)
CALCIUM SERPL-MCNC: 9.6 MG/DL — SIGNIFICANT CHANGE UP (ref 8.4–10.5)
CALCIUM SERPL-MCNC: 9.6 MG/DL — SIGNIFICANT CHANGE UP (ref 8.4–10.5)
CALCIUM SERPL-MCNC: 9.7 MG/DL — SIGNIFICANT CHANGE UP (ref 8.4–10.5)
CANNABINOIDS UR-MCNC: NEGATIVE — SIGNIFICANT CHANGE UP
CHLORIDE BLDA-SCNC: 107 MMOL/L — SIGNIFICANT CHANGE UP (ref 96–108)
CHLORIDE BLDA-SCNC: 109 MMOL/L — HIGH (ref 96–108)
CHLORIDE SERPL-SCNC: 100 MMOL/L — SIGNIFICANT CHANGE UP (ref 98–107)
CHLORIDE SERPL-SCNC: 103 MMOL/L — SIGNIFICANT CHANGE UP (ref 98–107)
CHLORIDE SERPL-SCNC: 107 MMOL/L — SIGNIFICANT CHANGE UP (ref 98–107)
CHLORIDE SERPL-SCNC: 110 MMOL/L — HIGH (ref 98–107)
CHLORIDE SERPL-SCNC: 111 MMOL/L — HIGH (ref 98–107)
CO2 SERPL-SCNC: 20 MMOL/L — LOW (ref 22–31)
CO2 SERPL-SCNC: 21 MMOL/L — LOW (ref 22–31)
CO2 SERPL-SCNC: 23 MMOL/L — SIGNIFICANT CHANGE UP (ref 22–31)
CO2 SERPL-SCNC: 23 MMOL/L — SIGNIFICANT CHANGE UP (ref 22–31)
CO2 SERPL-SCNC: 24 MMOL/L — SIGNIFICANT CHANGE UP (ref 22–31)
COCAINE METAB.OTHER UR-MCNC: NEGATIVE — SIGNIFICANT CHANGE UP
COLOR SPEC: SIGNIFICANT CHANGE UP
CREAT SERPL-MCNC: 0.98 MG/DL — SIGNIFICANT CHANGE UP (ref 0.5–1.3)
CREAT SERPL-MCNC: 1.01 MG/DL — SIGNIFICANT CHANGE UP (ref 0.5–1.3)
CREAT SERPL-MCNC: 1.02 MG/DL — SIGNIFICANT CHANGE UP (ref 0.5–1.3)
CREAT SERPL-MCNC: 1.03 MG/DL — SIGNIFICANT CHANGE UP (ref 0.5–1.3)
CREAT SERPL-MCNC: 1.07 MG/DL — SIGNIFICANT CHANGE UP (ref 0.5–1.3)
CRP SERPL-MCNC: 45 MG/L — HIGH
ERYTHROCYTE [SEDIMENTATION RATE] IN BLOOD: 29 MM/HR — HIGH (ref 1–15)
GLUCOSE BLDA-MCNC: 100 MG/DL — HIGH (ref 70–99)
GLUCOSE BLDA-MCNC: 102 MG/DL — HIGH (ref 70–99)
GLUCOSE SERPL-MCNC: 103 MG/DL — HIGH (ref 70–99)
GLUCOSE SERPL-MCNC: 122 MG/DL — HIGH (ref 70–99)
GLUCOSE SERPL-MCNC: 125 MG/DL — HIGH (ref 70–99)
GLUCOSE SERPL-MCNC: 91 MG/DL — SIGNIFICANT CHANGE UP (ref 70–99)
GLUCOSE SERPL-MCNC: 91 MG/DL — SIGNIFICANT CHANGE UP (ref 70–99)
GLUCOSE UR-MCNC: NEGATIVE — SIGNIFICANT CHANGE UP
HCO3 BLDA-SCNC: 26 MMOL/L — SIGNIFICANT CHANGE UP (ref 22–26)
HCO3 BLDA-SCNC: 26 MMOL/L — SIGNIFICANT CHANGE UP (ref 22–26)
HCT VFR BLDA CALC: 37.1 % — LOW (ref 39–51)
HCT VFR BLDA CALC: 38.4 % — LOW (ref 39–51)
HGB BLDA-MCNC: 12.1 G/DL — LOW (ref 13–17)
HGB BLDA-MCNC: 12.5 G/DL — LOW (ref 13–17)
HYALINE CASTS # UR AUTO: NEGATIVE — SIGNIFICANT CHANGE UP
KETONES UR-MCNC: NEGATIVE — SIGNIFICANT CHANGE UP
LACTATE BLDA-SCNC: 1 MMOL/L — SIGNIFICANT CHANGE UP (ref 0.5–2)
LACTATE BLDA-SCNC: 1.5 MMOL/L — SIGNIFICANT CHANGE UP (ref 0.5–2)
LEUKOCYTE ESTERASE UR-ACNC: NEGATIVE — SIGNIFICANT CHANGE UP
MAGNESIUM SERPL-MCNC: 1.8 MG/DL — SIGNIFICANT CHANGE UP (ref 1.6–2.6)
MAGNESIUM SERPL-MCNC: 1.9 MG/DL — SIGNIFICANT CHANGE UP (ref 1.6–2.6)
METHADONE UR-MCNC: NEGATIVE — SIGNIFICANT CHANGE UP
NITRITE UR-MCNC: NEGATIVE — SIGNIFICANT CHANGE UP
OPIATES UR-MCNC: NEGATIVE — SIGNIFICANT CHANGE UP
OXYCODONE UR-MCNC: NEGATIVE — SIGNIFICANT CHANGE UP
PCO2 BLDA: 31 MMHG — LOW (ref 35–48)
PCO2 BLDA: 34 MMHG — LOW (ref 35–48)
PCP UR-MCNC: NEGATIVE — SIGNIFICANT CHANGE UP
PH BLDA: 7.48 PH — HIGH (ref 7.35–7.45)
PH BLDA: 7.51 PH — HIGH (ref 7.35–7.45)
PH UR: 6.5 — SIGNIFICANT CHANGE UP (ref 5–8)
PHOSPHATE SERPL-MCNC: 2.3 MG/DL — LOW (ref 2.5–4.5)
PHOSPHATE SERPL-MCNC: 3.9 MG/DL — SIGNIFICANT CHANGE UP (ref 2.5–4.5)
PHOSPHATE SERPL-MCNC: 4 MG/DL — SIGNIFICANT CHANGE UP (ref 2.5–4.5)
PHOSPHATE SERPL-MCNC: 4.2 MG/DL — SIGNIFICANT CHANGE UP (ref 2.5–4.5)
PHOSPHATE SERPL-MCNC: 4.8 MG/DL — HIGH (ref 2.5–4.5)
PO2 BLDA: 103 MMHG — SIGNIFICANT CHANGE UP (ref 83–108)
PO2 BLDA: 161 MMHG — HIGH (ref 83–108)
POTASSIUM BLDA-SCNC: 2.9 MMOL/L — LOW (ref 3.4–4.5)
POTASSIUM BLDA-SCNC: 3.6 MMOL/L — SIGNIFICANT CHANGE UP (ref 3.4–4.5)
POTASSIUM SERPL-MCNC: 3 MMOL/L — LOW (ref 3.5–5.3)
POTASSIUM SERPL-MCNC: 3.7 MMOL/L — SIGNIFICANT CHANGE UP (ref 3.5–5.3)
POTASSIUM SERPL-MCNC: 3.9 MMOL/L — SIGNIFICANT CHANGE UP (ref 3.5–5.3)
POTASSIUM SERPL-MCNC: 4.1 MMOL/L — SIGNIFICANT CHANGE UP (ref 3.5–5.3)
POTASSIUM SERPL-MCNC: 4.4 MMOL/L — SIGNIFICANT CHANGE UP (ref 3.5–5.3)
POTASSIUM SERPL-SCNC: 3 MMOL/L — LOW (ref 3.5–5.3)
POTASSIUM SERPL-SCNC: 3.7 MMOL/L — SIGNIFICANT CHANGE UP (ref 3.5–5.3)
POTASSIUM SERPL-SCNC: 3.9 MMOL/L — SIGNIFICANT CHANGE UP (ref 3.5–5.3)
POTASSIUM SERPL-SCNC: 4.1 MMOL/L — SIGNIFICANT CHANGE UP (ref 3.5–5.3)
POTASSIUM SERPL-SCNC: 4.4 MMOL/L — SIGNIFICANT CHANGE UP (ref 3.5–5.3)
PROT UR-MCNC: 50 — SIGNIFICANT CHANGE UP
RBC CASTS # UR COMP ASSIST: SIGNIFICANT CHANGE UP (ref 0–?)
SAO2 % BLDA: 98.5 % — SIGNIFICANT CHANGE UP (ref 95–99)
SAO2 % BLDA: 99.6 % — HIGH (ref 95–99)
SODIUM BLDA-SCNC: 140 MMOL/L — SIGNIFICANT CHANGE UP (ref 136–146)
SODIUM BLDA-SCNC: 142 MMOL/L — SIGNIFICANT CHANGE UP (ref 136–146)
SODIUM SERPL-SCNC: 137 MMOL/L — SIGNIFICANT CHANGE UP (ref 135–145)
SODIUM SERPL-SCNC: 138 MMOL/L — SIGNIFICANT CHANGE UP (ref 135–145)
SODIUM SERPL-SCNC: 143 MMOL/L — SIGNIFICANT CHANGE UP (ref 135–145)
SODIUM SERPL-SCNC: 143 MMOL/L — SIGNIFICANT CHANGE UP (ref 135–145)
SODIUM SERPL-SCNC: 145 MMOL/L — SIGNIFICANT CHANGE UP (ref 135–145)
SP GR SPEC: 1.02 — SIGNIFICANT CHANGE UP (ref 1–1.04)
SQUAMOUS # UR AUTO: SIGNIFICANT CHANGE UP
UROBILINOGEN FLD QL: NORMAL — SIGNIFICANT CHANGE UP
WBC UR QL: SIGNIFICANT CHANGE UP (ref 0–?)

## 2020-02-19 PROCEDURE — 99232 SBSQ HOSP IP/OBS MODERATE 35: CPT

## 2020-02-19 PROCEDURE — 93306 TTE W/DOPPLER COMPLETE: CPT | Mod: 26

## 2020-02-19 PROCEDURE — 99291 CRITICAL CARE FIRST HOUR: CPT

## 2020-02-19 PROCEDURE — G0452: CPT | Mod: 26

## 2020-02-19 PROCEDURE — 99223 1ST HOSP IP/OBS HIGH 75: CPT | Mod: GC

## 2020-02-19 PROCEDURE — 95819 EEG AWAKE AND ASLEEP: CPT | Mod: 26

## 2020-02-19 PROCEDURE — 70450 CT HEAD/BRAIN W/O DYE: CPT | Mod: 26

## 2020-02-19 RX ORDER — PROPOFOL 10 MG/ML
50 INJECTION, EMULSION INTRAVENOUS
Qty: 1000 | Refills: 0 | Status: DISCONTINUED | OUTPATIENT
Start: 2020-02-19 | End: 2020-02-19

## 2020-02-19 RX ORDER — FAMOTIDINE 10 MG/ML
1 INJECTION INTRAVENOUS
Qty: 0 | Refills: 0 | DISCHARGE

## 2020-02-19 RX ORDER — POTASSIUM CHLORIDE 20 MEQ
10 PACKET (EA) ORAL
Refills: 0 | Status: COMPLETED | OUTPATIENT
Start: 2020-02-19 | End: 2020-02-19

## 2020-02-19 RX ORDER — NICARDIPINE HYDROCHLORIDE 30 MG/1
5 CAPSULE, EXTENDED RELEASE ORAL
Qty: 40 | Refills: 0 | Status: DISCONTINUED | OUTPATIENT
Start: 2020-02-19 | End: 2020-02-21

## 2020-02-19 RX ORDER — DEXMEDETOMIDINE HYDROCHLORIDE IN 0.9% SODIUM CHLORIDE 4 UG/ML
0.2 INJECTION INTRAVENOUS
Qty: 200 | Refills: 0 | Status: DISCONTINUED | OUTPATIENT
Start: 2020-02-19 | End: 2020-02-19

## 2020-02-19 RX ORDER — POTASSIUM CHLORIDE 20 MEQ
10 PACKET (EA) ORAL
Refills: 0 | Status: DISCONTINUED | OUTPATIENT
Start: 2020-02-19 | End: 2020-02-19

## 2020-02-19 RX ORDER — POTASSIUM PHOSPHATE, MONOBASIC POTASSIUM PHOSPHATE, DIBASIC 236; 224 MG/ML; MG/ML
15 INJECTION, SOLUTION INTRAVENOUS ONCE
Refills: 0 | Status: COMPLETED | OUTPATIENT
Start: 2020-02-19 | End: 2020-02-19

## 2020-02-19 RX ORDER — FENTANYL CITRATE 50 UG/ML
100 INJECTION INTRAVENOUS ONCE
Refills: 0 | Status: DISCONTINUED | OUTPATIENT
Start: 2020-02-19 | End: 2020-02-19

## 2020-02-19 RX ORDER — ATORVASTATIN CALCIUM 80 MG/1
1 TABLET, FILM COATED ORAL
Qty: 0 | Refills: 0 | DISCHARGE

## 2020-02-19 RX ORDER — AMLODIPINE BESYLATE 2.5 MG/1
1 TABLET ORAL
Qty: 0 | Refills: 0 | DISCHARGE

## 2020-02-19 RX ORDER — FENTANYL CITRATE 50 UG/ML
1 INJECTION INTRAVENOUS
Qty: 2500 | Refills: 0 | Status: DISCONTINUED | OUTPATIENT
Start: 2020-02-19 | End: 2020-02-20

## 2020-02-19 RX ORDER — FENTANYL CITRATE 50 UG/ML
50 INJECTION INTRAVENOUS ONCE
Refills: 0 | Status: DISCONTINUED | OUTPATIENT
Start: 2020-02-19 | End: 2020-02-19

## 2020-02-19 RX ADMIN — CHLORHEXIDINE GLUCONATE 1 APPLICATION(S): 213 SOLUTION TOPICAL at 16:34

## 2020-02-19 RX ADMIN — SODIUM CHLORIDE 30 MILLILITER(S): 5 INJECTION, SOLUTION INTRAVENOUS at 08:43

## 2020-02-19 RX ADMIN — FENTANYL CITRATE 50 MICROGRAM(S): 50 INJECTION INTRAVENOUS at 15:30

## 2020-02-19 RX ADMIN — FENTANYL CITRATE 100 MICROGRAM(S): 50 INJECTION INTRAVENOUS at 09:50

## 2020-02-19 RX ADMIN — FENTANYL CITRATE 6.74 MICROGRAM(S)/KG/HR: 50 INJECTION INTRAVENOUS at 19:24

## 2020-02-19 RX ADMIN — LEVETIRACETAM 400 MILLIGRAM(S): 250 TABLET, FILM COATED ORAL at 18:09

## 2020-02-19 RX ADMIN — NICARDIPINE HYDROCHLORIDE 25 MG/HR: 30 CAPSULE, EXTENDED RELEASE ORAL at 08:43

## 2020-02-19 RX ADMIN — Medication 100 MILLIEQUIVALENT(S): at 02:35

## 2020-02-19 RX ADMIN — SODIUM CHLORIDE 30 MILLILITER(S): 5 INJECTION, SOLUTION INTRAVENOUS at 19:23

## 2020-02-19 RX ADMIN — Medication 100 MILLIEQUIVALENT(S): at 01:16

## 2020-02-19 RX ADMIN — DEXMEDETOMIDINE HYDROCHLORIDE IN 0.9% SODIUM CHLORIDE 3.37 MICROGRAM(S)/KG/HR: 4 INJECTION INTRAVENOUS at 10:30

## 2020-02-19 RX ADMIN — LEVETIRACETAM 400 MILLIGRAM(S): 250 TABLET, FILM COATED ORAL at 05:08

## 2020-02-19 RX ADMIN — CHLORHEXIDINE GLUCONATE 15 MILLILITER(S): 213 SOLUTION TOPICAL at 05:08

## 2020-02-19 RX ADMIN — CHLORHEXIDINE GLUCONATE 15 MILLILITER(S): 213 SOLUTION TOPICAL at 18:08

## 2020-02-19 RX ADMIN — POTASSIUM PHOSPHATE, MONOBASIC POTASSIUM PHOSPHATE, DIBASIC 62.5 MILLIMOLE(S): 236; 224 INJECTION, SOLUTION INTRAVENOUS at 01:15

## 2020-02-19 RX ADMIN — NICARDIPINE HYDROCHLORIDE 25 MG/HR: 30 CAPSULE, EXTENDED RELEASE ORAL at 19:23

## 2020-02-19 RX ADMIN — Medication 100 MILLIEQUIVALENT(S): at 03:41

## 2020-02-19 RX ADMIN — FENTANYL CITRATE 6.74 MICROGRAM(S)/KG/HR: 50 INJECTION INTRAVENOUS at 16:34

## 2020-02-19 NOTE — CHART NOTE - NSCHARTNOTEFT_GEN_A_CORE
38 y/o M w/ Mhx HTN, T2DM, prior ETOH and cocaine abuse initially presented at [facility] on 9/28 and was found to have a right basal ganglia hemorrhagic stroke s/p craniotomy and EVD placement c/b 40 y/o M w/ Mhx HTN, T2DM, prior ETOH and cocaine abuse initially presented at [facility] on 9/28 and was found to have a right basal ganglia hemorrhagic stroke a/w residual LUE/LLE deficits s/p craniotomy and EVD placement. Patient was discharged on 11/02 but returned to an OSH for recurrent throbbing headache found to have a new Left thalamus hypodensity presumed to be 2/2 ischemic stroke. Patient was outside the window of tPA and was d/c home w/ recs for outpt MRI and PT. Patient continued to have persistent throbbing frontal headaches and presented to [facility] on 11/13 found to have a R parietal density adjacent to an area of encephalomalacia (unable to r/o if related to craniotomy or hemorrhagic focus). Patient was admitted to [place] w/ hospital course significant for HTN management w/ amlodipine 10 qd and labetalol 200mg BID. Patient's headache was treated w/ magnesium, reglan, benadryl and amitryptiline. Course c/b by DILMA

## 2020-02-19 NOTE — PROGRESS NOTE ADULT - SUBJECTIVE AND OBJECTIVE BOX
CHIEF COMPLAINT: Patient is a 40y old  Male who presents with a chief complaint of   Interval Events:    REVIEW OF SYSTEMS:  Constitutional:   Eyes:  ENT:  CV:  Resp:  GI:  :  MSK:  Integumentary:  Neurological:  Psychiatric:  Endocrine:  Hematologic/Lymphatic:  Allergic/Immunologic:  [ ] All other systems negative  [ ] Unable to assess ROS because ________    OBJECTIVE:  ICU Vital Signs Last 24 Hrs  T(C): 37.2 (19 Feb 2020 04:00), Max: 37.2 (19 Feb 2020 04:00)  T(F): 99 (19 Feb 2020 04:00), Max: 99 (19 Feb 2020 04:00)  HR: 96 (19 Feb 2020 06:28) (90 - 121)  BP: 108/80 (19 Feb 2020 06:00) (103/78 - 154/112)  BP(mean): 88 (19 Feb 2020 06:00) (87 - 124)  ABP: --  ABP(mean): --  RR: 15 (19 Feb 2020 06:00) (15 - 18)  SpO2: 99% (19 Feb 2020 06:28) (98% - 100%)    Mode: AC/ CMV (Assist Control/ Continuous Mandatory Ventilation), RR (machine): 15, TV (machine): 450, FiO2: 21, PEEP: 5, MAP: 8, PIP: 15    02-18 @ 07:01  -  02-19 @ 06:57  --------------------------------------------------------  IN: 1019.9 mL / OUT: 1185 mL / NET: -165.1 mL      CAPILLARY BLOOD GLUCOSE          PHYSICAL EXAM:  General:   HEENT:   Lymph Nodes:  Neck:   Respiratory:   Cardiovascular:   Abdomen:   Extremities:   Skin:   Neurological:  Psychiatry:    HOSPITAL MEDICATIONS:  MEDICATIONS  (STANDING):  chlorhexidine 0.12% Liquid 15 milliLiter(s) Oral Mucosa every 12 hours  chlorhexidine 4% Liquid 1 Application(s) Topical <User Schedule>  levETIRAcetam  IVPB 500 milliGRAM(s) IV Intermittent every 12 hours  niCARdipine Infusion 5 mG/Hr (25 mL/Hr) IV Continuous <Continuous>  propofol Infusion 50 MICROgram(s)/kG/Min (20.22 mL/Hr) IV Continuous <Continuous>  sodium chloride 3%. 500 milliLiter(s) (30 mL/Hr) IV Continuous <Continuous>    MEDICATIONS  (PRN):      LABS:  (02-18 @ 18:20)                        14.9  8.22 )-----------( 264                 41.4    Neutrophils = 6.39 (77.8%)  Lymphocytes = 1.39 (16.9%)  Eosinophils = 0.11 (1.3%)  Basophils = 0.03 (0.4%)  Monocytes = 0.28 (3.4%)  Bands = --%    WBC Trend: 8.22<--  Hb Trend: 14.9<--  Plt Trend: 264<--  02-19    138  |  103  |  13  ----------------------------<  91  3.9   |  23  |  0.98    Ca    9.7      19 Feb 2020 03:40  Phos  4.0     02-19  Mg     1.8     02-19    TPro  8.6<H>  /  Alb  4.9  /  TBili  0.8  /  DBili  x   /  AST  23  /  ALT  26  /  AlkPhos  136<H>  02-18    Creatinine Trend: 0.98<--, 1.02<--, 0.96<--  PT/INR - ( 18 Feb 2020 18:20 )   PT: 11.2 SEC;   INR: 0.98          PTT - ( 18 Feb 2020 18:20 )  PTT:38.2 SEC    Arterial Blood Gas:  02-19 @ 02:45  7.48/34/103/26/98.5/1.5  ABG lactate: 1.0  Arterial Blood Gas:  02-19 @ 00:30  7.51/31/161/26/99.6/1.4  ABG lactate: 1.5    Venous Blood Gas:  02-18 @ 18:20  7.48/35/74/27/96.0  VBG Lactate: 1.9          MICROBIOLOGY:   Blood Cx:  Urine Cx:  Sputum Cx:  Legionella:  RVP:    RADIOLOGY:  X Ray:  CT:  MRI:  Ultrasound:  [ ] Reviewed and interpreted by me    EKG: CHIEF COMPLAINT: Patient is a 40y old  Male who presents with a chief complaint of   Interval Events: Patient seen and examined at bedside. NAEO.     REVIEW OF SYSTEMS:  Constitutional:   Eyes:  ENT:  CV:  Resp:  GI:  :  MSK:  Integumentary:  Neurological:  Psychiatric:  Endocrine:  Hematologic/Lymphatic:  Allergic/Immunologic:  [ ] All other systems negative  [x] Unable to assess ROS because patient intubated and sedated     OBJECTIVE:  ICU Vital Signs Last 24 Hrs  T(C): 37.2 (19 Feb 2020 04:00), Max: 37.2 (19 Feb 2020 04:00)  T(F): 99 (19 Feb 2020 04:00), Max: 99 (19 Feb 2020 04:00)  HR: 96 (19 Feb 2020 06:28) (90 - 121)  BP: 108/80 (19 Feb 2020 06:00) (103/78 - 154/112)  BP(mean): 88 (19 Feb 2020 06:00) (87 - 124)  ABP: --  ABP(mean): --  RR: 15 (19 Feb 2020 06:00) (15 - 18)  SpO2: 99% (19 Feb 2020 06:28) (98% - 100%)    Mode: AC/ CMV (Assist Control/ Continuous Mandatory Ventilation), RR (machine): 15, TV (machine): 450, FiO2: 21, PEEP: 5, MAP: 8, PIP: 15    02-18 @ 07:01  -  02-19 @ 06:57  --------------------------------------------------------  IN: 1019.9 mL / OUT: 1185 mL / NET: -165.1 mL      CAPILLARY BLOOD GLUCOSE          PHYSICAL EXAM:  General: intubated and sedated  HEENT: anicteric sclera, MMM, PERRLA   Neck: trachea midline  Respiratory: CTA b/l no rales ronchi wheezing  Cardiovascular: normal s1 s2 no grm   Abdomen: soft non distended  Extremities: no swelling in LEs   Neurological: localizes to pain  Psychiatry: sedated     HOSPITAL MEDICATIONS:  MEDICATIONS  (STANDING):  chlorhexidine 0.12% Liquid 15 milliLiter(s) Oral Mucosa every 12 hours  chlorhexidine 4% Liquid 1 Application(s) Topical <User Schedule>  levETIRAcetam  IVPB 500 milliGRAM(s) IV Intermittent every 12 hours  niCARdipine Infusion 5 mG/Hr (25 mL/Hr) IV Continuous <Continuous>  propofol Infusion 50 MICROgram(s)/kG/Min (20.22 mL/Hr) IV Continuous <Continuous>  sodium chloride 3%. 500 milliLiter(s) (30 mL/Hr) IV Continuous <Continuous>    MEDICATIONS  (PRN):      LABS:  (02-18 @ 18:20)                        14.9  8.22 )-----------( 264                 41.4    Neutrophils = 6.39 (77.8%)  Lymphocytes = 1.39 (16.9%)  Eosinophils = 0.11 (1.3%)  Basophils = 0.03 (0.4%)  Monocytes = 0.28 (3.4%)  Bands = --%    WBC Trend: 8.22<--  Hb Trend: 14.9<--  Plt Trend: 264<--  02-19    138  |  103  |  13  ----------------------------<  91  3.9   |  23  |  0.98    Ca    9.7      19 Feb 2020 03:40  Phos  4.0     02-19  Mg     1.8     02-19    TPro  8.6<H>  /  Alb  4.9  /  TBili  0.8  /  DBili  x   /  AST  23  /  ALT  26  /  AlkPhos  136<H>  02-18    Creatinine Trend: 0.98<--, 1.02<--, 0.96<--  PT/INR - ( 18 Feb 2020 18:20 )   PT: 11.2 SEC;   INR: 0.98          PTT - ( 18 Feb 2020 18:20 )  PTT:38.2 SEC    Arterial Blood Gas:  02-19 @ 02:45  7.48/34/103/26/98.5/1.5  ABG lactate: 1.0  Arterial Blood Gas:  02-19 @ 00:30  7.51/31/161/26/99.6/1.4  ABG lactate: 1.5    Venous Blood Gas:  02-18 @ 18:20  7.48/35/74/27/96.0  VBG Lactate: 1.9

## 2020-02-19 NOTE — PROGRESS NOTE ADULT - ATTENDING COMMENTS
Agree with above.  Patient seen and examined. Chart reviewed.    40 year old man with thalamic bleed, hypertension, ? seizure, respiratory failure     - wakes up with decreased sedation and able to follow simple commands  - follow up CT planned  - Cardene drip for BP control  - continue 3% saline  - appreciate NSx input

## 2020-02-19 NOTE — PROGRESS NOTE ADULT - ASSESSMENT
40 year old male h/o right sided CVA (evidence of right craniotomy on CT) p/w AMS, intubated at [place], +witnessed grand mal seizure by EMS, found to have acute left thalamic bleed with slight extension of IVH, NO hydrocephalus, CTA head and neck normal      1. No acute neurosurgical intervention no anticipated unless patient develops obstructive HCP  2. Will need to obtain more history from family regarding medications, h/o stroke and surgery  3. DC sedation, wean to extubate  4. Neuro checks q 1 hours (ideally propofol should be paused for exam)  5. Repeat CT head this afternoon or earlier if any change in mental status  6. MRI brain +/- when stable for further evaluation  7. [place] VEEG per neurology   8. Strict BP control, Goal -150

## 2020-02-19 NOTE — CHART NOTE - NSCHARTNOTEFT_GEN_A_CORE
: Amado Snyder   INDICATION: Intubated     PROCEDURE:  [ x] LIMITED ECHO  [x ] LIMITED CHEST  [ ] LIMITED RETROPERITONEAL  [ ] LIMITED ABDOMINAL  [ ] LIMITED DVT  [ ] NEEDLE GUIDANCE VASCULAR  [ ] NEEDLE GUIDANCE THORACENTESIS  [ ] NEEDLE GUIDANCE PARACENTESIS  [ ] NEEDLE GUIDANCE PERICARDIOCENTESIS  [ ] OTHER    FINDINGS:  -Lungs: predominantly A lines bilaterally  -Cardiac: grossly normal LVSF      INTERPRETATION:    Overall normal lung and cardiac exam.     Images uploaded on [employer]

## 2020-02-19 NOTE — EEG REPORT - NS EEG TEXT BOX
[facility]   COMPREHENSIVE EPILEPSY CENTER   REPORT OF ROUTINE VIDEO EEG     [facility]: [address], [place] [address], Ph#: [phone]  [facility]: [address], [place] [address], Ph#: [phone]  [facility]: [address] [place] [address], Ph#: [phone]    Patient Name: LUIS EDUARDO FRANKEL  Age and : 40y (82)  MRN #: [id number]  Location: [place] [address]  Referring Physician: Cristiane Ernst    Study Date: 20    _____________________________________________________________  TECHNICAL INFORMATION    Placement and Labeling of Electrodes:  The EEG was performed utilizing 20 channels referential EEG connections (coronal over temporal over parasagittal montage) using all standard 10-20 electrode placements with EKG.  Recording was at a sampling rate of 256 samples per second per channel.  Time synchronized digital video recording was done simultaneously with EEG recording.  A low light infrared camera was used for low light recording.  Christopher and seizure detection algorithms were utilized.    _____________________________________________________________  HISTORY    Patient is a 40y old  Male who presents with a chief complaint of     PERTINENT MEDICATION:  levETIRAcetam  IVPB 500 milliGRAM(s) IV Intermittent every 12 hours    _____________________________________________________________  STUDY INTERPRETATION    Findings: The background was continuous, spontaneously variable and minimally reactive. No posterior dominant rhythm seen.    Background Slowing:  Background predominantly consisted of theta, delta and faster activities.    Focal Slowing:   Continuous polymorphic delta slowing in the right hemisphere.     Sleep Background:  Stage II sleep transients were not recorded.    Other Non-Epileptiform Findings:  Attenuation of fast activity in the right hemisphere.  Breach effect in right hemisphere, max parietally, characterized by higher amplitude and sharply contoured waves.     Interictal Epileptiform Activity:   None were present.    Events:  Clinical events: None recorded.  Seizures: None recorded.    Activation Procedures:   Hyperventilation was not performed.    Photic stimulation was performed and did not elicit any abnormality.     Artifacts:  Intermittent myogenic and movement artifacts were noted.    ECG:  The heart rate on single channel ECG was predominantly between 60-70 BPM.    _____________________________________________________________  EEG SUMMARY/CLASSIFICATION    Abnormal EEG in an altered patient.  - Continuous polymorphic delta slowing in the right hemisphere.   - Attenuation of fast activity in the right hemisphere.  - Moderate to severe generalized slowing.  - Breach effect in right hemisphere, max parietally, characterized by higher amplitude and sharply contoured waves.   _____________________________________________________________  EEG IMPRESSION/CLINICAL CORRELATE    Abnormal EEG study.  1. Cortical dysfunction in the right hemisphere.  2. Moderate to severe nonspecific diffuse or multifocal cerebral dysfunction.   3. No epileptiform pattern or seizure seen.  4. 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     _____________________________________________________________    Martinez Colunga MD  Attending Physician, [facility] Epilepsy [place] [facility]   COMPREHENSIVE EPILEPSY CENTER   REPORT OF ROUTINE VIDEO EEG     [facility]: [address], [place] [address], Ph#: [phone]  [facility]: [address], [place] [address], Ph#: [phone]  [facility]: [address] [place] [address], Ph#: [phone]    Patient Name: LUIS EDUARDO FRANKEL  Age and : 40y (82)  MRN #: [id number]  Location: [place] [address]  Referring Physician: Cristiane Ernst    Study Date: 20    _____________________________________________________________  TECHNICAL INFORMATION    Placement and Labeling of Electrodes:  The EEG was performed utilizing 20 channels referential EEG connections (coronal over temporal over parasagittal montage) using all standard 10-20 electrode placements with EKG.  Recording was at a sampling rate of 256 samples per second per channel.  Time synchronized digital video recording was done simultaneously with EEG recording.  A low light infrared camera was used for low light recording.  Christopher and seizure detection algorithms were utilized.    _____________________________________________________________  HISTORY    Patient is a 40y old  Male who presents with a chief complaint of     PERTINENT MEDICATION:  levETIRAcetam  IVPB 500 milliGRAM(s) IV Intermittent every 12 hours    _____________________________________________________________  STUDY INTERPRETATION    Findings: The background was continuous, spontaneously variable and minimally reactive. No posterior dominant rhythm seen.    Background Slowing:  Background predominantly consisted of theta, delta and faster activities.    Focal Slowing:   Continuous polymorphic delta slowing in the right hemisphere.     Sleep Background:  Stage II sleep transients were not recorded.    Other Non-Epileptiform Findings:  Attenuation of fast activity in the right hemisphere.  Breach effect in right hemisphere, max parietally, characterized by higher amplitude and sharply contoured waves.     Interictal Epileptiform Activity:   None were present.    Events:  Clinical events: None recorded.  Seizures: None recorded.    Activation Procedures:   Hyperventilation was not performed.    Photic stimulation was performed and did not elicit any abnormality.     Artifacts:  Intermittent myogenic and movement artifacts were noted.    ECG:  The heart rate on single channel ECG was predominantly between 60-70 BPM.    _____________________________________________________________  EEG SUMMARY/CLASSIFICATION    Abnormal EEG in an altered patient.  - Continuous polymorphic delta slowing in the right hemisphere.   - Attenuation of fast activity in the right hemisphere.  - Moderate to severe generalized slowing.  - Breach effect in right hemisphere, max parietally, characterized by higher amplitude and sharply contoured waves.   _____________________________________________________________  EEG IMPRESSION/CLINICAL CORRELATE    Abnormal EEG study.  1. Cortical dysfunction and structural abnormality in the right hemisphere.  2. Moderate to severe nonspecific diffuse or multifocal cerebral dysfunction.   3. No epileptiform pattern or seizure seen.  4. 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     _____________________________________________________________    Martinez Colunga MD  Attending Physician, [facility] Epilepsy [place]

## 2020-02-19 NOTE — PROGRESS NOTE ADULT - SUBJECTIVE AND OBJECTIVE BOX
SUBJECTIVE EVENTS: remained intubated and sedated on propofol all night      Vital Signs Last 24 Hrs  T(C): 37.2 (19 Feb 2020 04:00), Max: 37.2 (19 Feb 2020 04:00)  T(F): 99 (19 Feb 2020 04:00), Max: 99 (19 Feb 2020 04:00)  HR: 96 (19 Feb 2020 06:28) (90 - 121)  BP: 108/80 (19 Feb 2020 06:00) (103/78 - 154/112)  BP(mean): 88 (19 Feb 2020 06:00) (87 - 124)  RR: 15 (19 Feb 2020 06:00) (15 - 18)  SpO2: 99% (19 Feb 2020 06:28) (98% - 100%)      PHYSICAL EXAM:  Intubated, on propofol  Pupils 3mm briskly reactive  Briskly localizing to noxious stimuli (right better than the left)  Over breathing ventilator  + Cough        DIET:      MEDICATIONS  (STANDING):  chlorhexidine 0.12% Liquid 15 milliLiter(s) Oral Mucosa every 12 hours  chlorhexidine 4% Liquid 1 Application(s) Topical <User Schedule>  levETIRAcetam  IVPB 500 milliGRAM(s) IV Intermittent every 12 hours  niCARdipine Infusion 5 mG/Hr (25 mL/Hr) IV Continuous <Continuous>  propofol Infusion 50 MICROgram(s)/kG/Min (20.22 mL/Hr) IV Continuous <Continuous>  sodium chloride 3%. 500 milliLiter(s) (30 mL/Hr) IV Continuous <Continuous>    MEDICATIONS  (PRN):                            14.9   8.22  )-----------( 264      ( 18 Feb 2020 18:20 )             41.4   02-19    138  |  103  |  13  ----------------------------<  91  3.9   |  23  |  0.98    Ca    9.7      19 Feb 2020 03:40  Phos  4.0     02-19  Mg     1.8     02-19    TPro  8.6<H>  /  Alb  4.9  /  TBili  0.8  /  DBili  x   /  AST  23  /  ALT  26  /  AlkPhos  136<H>  02-18  PT/INR - ( 18 Feb 2020 18:20 )   PT: 11.2 SEC;   INR: 0.98          PTT - ( 18 Feb 2020 18:20 )  PTT:38.2 SEC      RADIOLGY:   < from: CT Head No Cont (02.18.20 @ 18:39) >    EXAM:  CT BRAIN        PROCEDURE DATE:  Feb 18 2020         INTERPRETATION:      CLINICAL INDICATION: Acute parenchymal hematoma in the left thalamus.      TECHNIQUE: CT of the head was performed without the administration of  intravenous contrast.    COMPARISON: Head CT from 2/18/2020 (performed at outside hospital). [facility]    FINDINGS:    1.4 cm round density in the left thalamic area with surrounding vasogenic edema which is consistent with the acute parenchymal hemorrhage with extension into the left lateral ventricle and effacement of the left temporal horn. No midline shift. No signs of hydrocephalus.  Encephalomalacia in the right subinsular corona radiata region as well as the right parietal territory suggested with ex vacuo dilatation of the right lateral ventricle seen   Globes are symmetric in size and contour. Visualized bilateral paranasal sinuses and mastoid air cells are clear.Right parietal craniotomy.        IMPRESSION:    Acute left intrathalamic hemorrhage with left lateral ventricular extension and some obscuration of the left temporal horn of the lateral ventricle. No hydrocephalus..     < end of copied text >

## 2020-02-19 NOTE — PROGRESS NOTE ADULT - ASSESSMENT
39 yo M c PMH of HTN and ?ICH in September 2019 (with residual LUE and LLE weakness) transferred from  where he presented with AMS and unresponsiveness, that was preceded by dizziness, HA, and LUE and LLE weakness. code stroke called which found ICH, pt intubated also had LLE shaking so was loaded with keppra and started on propofol, and nicardipine and transferred. repeat CTH in [facility] ED was stable.     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 E(1) V(NT) M(4) on sedation. +gag reflex.   - repeat 24h CTH  - elevated HOB 30 degrees   - keppra 500 mg bid  - Strict BP control, Goal SBP <160  - q1h neuro checks  - Goal Na 145-150  -  video EEG  - MRI brain w, w/o when able  - obtain hx of prior stroke from family/?[place] (not in system)- if unable to obtain will need TTE with bubble, hypercoaguable workup, venous dopplers of the lower extremities.   - f/u neuro and neurosurgery recs     CARDIAC: h/o HTN.   - c/w cardine drip goal SBP < 160    RESPIRATORY: ETT (confirmed on [facility] CXR)  - c/w vent    GI: no acute issues    ENDO: no acute issues  - SSI  - f/u A1c     RENAL: Goal Na 145-150  - hypertonic saline 3% started at 30 cc/hr for Na 138  - BMP q4h    HEME/ONC:  - hold chemical dvt ppx for now     PPX: SCDs 39 yo M c PMH of HTN and ?ICH in September 2019 (with residual LUE and LLE weakness) transferred from  where he presented with AMS and unresponsiveness, that was preceded by dizziness, HA, and LUE and LLE weakness. code stroke called which found ICH, pt intubated also had LLE shaking so was loaded with keppra and started on propofol, and nicardipine and transferred. repeat CTH in [facility] ED was stable.     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 E(1) V(NT) M(4) on sedation. +gag reflex.   - repeat 24h CTH  - elevated HOB 30 degrees   - keppra 500 mg bid  - Strict BP control, Goal SBP <160  - q1h neuro checks  - Goal Na 145-150  -  video EEG  - MRI brain w, w/o when able  - obtain hx of prior stroke from family/?[place] (not in system)- if unable to obtain will need TTE with bubble, hypercoaguable workup, venous dopplers of the lower extremities.   - f/u neuro and neurosurgery recs     CARDIAC: h/o HTN.   - c/w cardine drip goal SBP < 160    RESPIRATORY: ETT (confirmed on [facility] CXR)  -no active issues      GI: no acute issues    ENDO: no acute issues  - SSI  - f/u A1c     RENAL: Goal Na 145-150  - hypertonic saline 3% started at 30 cc/hr for Na 138  - BMP q4h    HEME/ONC:  - hold chemical dvt ppx for now     PPX: SCDs 39 yo M c PMH of HTN and ?ICH in September 2019 (with residual LUE and LLE weakness) transferred from  where he presented with AMS and unresponsiveness, that was preceded by dizziness, HA, and LUE and LLE weakness. code stroke called which found ICH, pt intubated also had LLE shaking so was loaded with keppra and started on propofol, and nicardipine and transferred. repeat CTH in [facility] ED was stable.     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 E(1) V(NT) M(4) on sedation. +gag reflex.   - repeat 24h CTH  - elevated HOB 30 degrees   - keppra 500 mg bid  - c/w cardine for strict BP control, Goal SBP <160  - q1h neuro checks  - c/w 3% saline to keep Na 145-150  - video EEG  - MRI brain w, w/o when able  - obtain hx of prior stroke from family/?[place] (not in system)- if unable to obtain will need TTE with bubble, hypercoaguable workup, venous dopplers of the lower extremities.   - f/u neuro and neurosurgery recs     CARDIAC: h/o HTN.   - c/w cardine drip goal SBP < 160    RESPIRATORY: ETT (confirmed on [facility] CXR)  -no active issues      GI: no acute issues    ENDO: no acute issues  - SSI  - f/u A1c     RENAL: Goal Na 145-150  - hypertonic saline 3% started at 30 cc/hr for Na 138  - BMP q4h    HEME/ONC:  - hold chemical dvt ppx for now     PPX: SCDs 39 yo M c PMH of HTN and ?ICH in September 2019 (with residual LUE and LLE weakness) transferred from  where he presented with AMS and unresponsiveness, that was preceded by dizziness, HA, and LUE and LLE weakness. code stroke called which found ICH, pt intubated also had LLE shaking so was loaded with keppra and started on propofol, and nicardipine and transferred. repeat CTH in [facility] ED was stable.     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 E(1) V(NT) M(4) on sedation. +gag reflex.   - pend read for 24h CT  - elevated HOB 30 degrees   - keppra 500 mg bid  - c/w cardine for strict BP control, Goal SBP <160  - q1h neuro checks  - c/w 3% saline to keep Na 145-150  - video EEG  - MRI brain w, w/o when able  - f/u TTE  - f/u LE duplex  - f/u hypercoagulable work up   - f/u neuro and neurosurgery recs     CARDIAC: h/o HTN  - c/w cardine drip goal SBP < 160    RESPIRATORY: ETT (confirmed on [facility] CXR)  -no active issues      GI: no acute issues    ENDO: no acute issues  - SSI  - f/u A1c     RENAL: Goal Na 145-150  - hypertonic saline 3% started at 30 cc/hr for Na 138  - BMP q4h    HEME/ONC:  - hold chemical dvt ppx for now     PPX: SCDs 39 yo M c PMH of HTN and ?ICH in September 2019 (with residual LUE and LLE weakness) transferred from  where he presented with AMS and unresponsiveness, that was preceded by dizziness, HA, and LUE and LLE weakness. code stroke called which found ICH, pt intubated also had LLE shaking so was loaded with keppra and started on propofol, and nicardipine and transferred. repeat CTH in [facility] ED was stable.     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 E(1) V(NT) M(4) on sedation. +gag reflex  - pend read for 24h CT  - elevated HOB 30 degrees   - keppra 500 mg bid  - c/w cardine for strict BP control, Goal SBP <160  - q1h neuro checks  - c/w 3% saline to keep Na 145-150  - video EEG  - MRI brain w, w/o when able  - f/u TTE  - f/u LE duplex  - f/u hypercoagulable work up   - f/u neuro and neurosurgery recs     CARDIAC: h/o HTN  - c/w cardine drip goal SBP < 160    RESPIRATORY: ETT (confirmed on [facility] CXR)  -no active issues      GI: no acute issues    ENDO: no acute issues  - SSI  - f/u A1c     RENAL: Goal Na 145-150  - hypertonic saline 3% started at 30 cc/hr for Na 138  - BMP q4h    HEME/ONC:  - hold chemical dvt ppx for now     PPX: SCDs 40 y/o M w/ Mhx HTN, T2DM, prior ETOH and cocaine abuse recently hospitalized for L basal ganglia hemorrhagic stroke (with residual LUE and LLE weakness) transferred from  where he presented with AMS/obtundation preceded by dizziness, HA, and worsening left sided weakness found to have ICH, intubated for airway protection w/ hospital course c/b LLE shaking presumed to be seizure requiring keppra and ativan. Patient was transferred to [facility] for neuro eval and EEG.     NEURO  Hx of L basal ganglia hemorrhagic stroke, L thalamus ischemic stroke and R parietal density no p/w worsening mental status found to have 1.5 cm acute L thalamic IPH with vasogenic edema  - pend read for 24h CT  - elevated HOB 30 degrees   - keppra 500 mg bid  - c/w cardine for strict BP control, Goal SBP <160  - q1h neuro checks  - c/w 3% saline to keep Na 145-150  - video EEG  - MRI brain w, w/o when able  - f/u TTE  - f/u LE duplex  - f/u hypercoagulable work up   - f/u neuro and neurosurgery recs     CARDIAC: h/o HTN  - c/w cardine drip goal SBP < 160    RESPIRATORY: ETT (confirmed on [facility] CXR)  -no active issues    ID  -no active issues     GI  -no acute issues    ENDO  -no acute issues  - ISS; A1C 4.9    RENAL  -hypertonic saline 3% started at 30 cc/hr to keep goal Na 145-150  -BMP q4h    HEME/ONC:  -DVT PPX- SCDs 40 y/o M w/ Mhx HTN, T2DM, prior ETOH and cocaine abuse recently hospitalized for L basal ganglia hemorrhagic stroke (with residual LUE and LLE weakness) transferred from  where he presented with AMS/obtundation preceded by dizziness, HA, and worsening left sided weakness found to have ICH, intubated for airway protection w/ hospital course c/b LLE shaking presumed to be seizure requiring keppra and ativan. Patient was transferred to [facility] for neuro eval and EEG.     NEURO  Hx of L basal ganglia hemorrhagic stroke, L thalamus ischemic stroke and R parietal density no p/w worsening mental status found to have 1.5 cm acute L thalamic IPH with vasogenic edema  - pend read for 24h CT  - elevated HOB 30 degrees   - keppra 500 mg bid  - c/w cardine for strict BP control, Goal SBP <160  - q1h neuro checks  - c/w 3% saline to keep Na 145-150  - video EEG  - MRI brain w, w/o when able  - f/u TTE  - f/u LE duplex  - f/u hypercoagulable work up   - f/u neuro and neurosurgery recs     CARDIAC: h/o HTN  - c/w cardine drip goal SBP < 160    RESPIRATORY  -intubated; will wean off sedation for possible extubation tomorrow      ID  -no active issues     GI  -NPO for now      ENDO  -no acute issues  - ISS; A1C 4.9    RENAL  -hypertonic saline 3% started at 30 cc/hr to keep goal Na 145-150  -BMP q4h    HEME/ONC:  -DVT PPX- SCDs

## 2020-02-20 LAB
ALBUMIN SERPL ELPH-MCNC: 3 G/DL — LOW (ref 3.3–5)
ALP SERPL-CCNC: 81 U/L — SIGNIFICANT CHANGE UP (ref 40–120)
ALT FLD-CCNC: 12 U/L — SIGNIFICANT CHANGE UP (ref 4–41)
ANION GAP SERPL CALC-SCNC: 11 MMO/L — SIGNIFICANT CHANGE UP (ref 7–14)
ANION GAP SERPL CALC-SCNC: 13 MMO/L — SIGNIFICANT CHANGE UP (ref 7–14)
ANION GAP SERPL CALC-SCNC: 16 MMO/L — HIGH (ref 7–14)
APCR PPP: 2.86 RATIO — SIGNIFICANT CHANGE UP
AST SERPL-CCNC: 9 U/L — SIGNIFICANT CHANGE UP (ref 4–40)
AT III ACT/NOR PPP CHRO: 110 % — SIGNIFICANT CHANGE UP (ref 76–140)
BILIRUB SERPL-MCNC: 0.5 MG/DL — SIGNIFICANT CHANGE UP (ref 0.2–1.2)
BUN SERPL-MCNC: 10 MG/DL — SIGNIFICANT CHANGE UP (ref 7–23)
BUN SERPL-MCNC: 11 MG/DL — SIGNIFICANT CHANGE UP (ref 7–23)
BUN SERPL-MCNC: 12 MG/DL — SIGNIFICANT CHANGE UP (ref 7–23)
BUN SERPL-MCNC: 13 MG/DL — SIGNIFICANT CHANGE UP (ref 7–23)
BUN SERPL-MCNC: 13 MG/DL — SIGNIFICANT CHANGE UP (ref 7–23)
CALCIUM SERPL-MCNC: 7.7 MG/DL — LOW (ref 8.4–10.5)
CALCIUM SERPL-MCNC: 9 MG/DL — SIGNIFICANT CHANGE UP (ref 8.4–10.5)
CALCIUM SERPL-MCNC: 9.4 MG/DL — SIGNIFICANT CHANGE UP (ref 8.4–10.5)
CALCIUM SERPL-MCNC: 9.7 MG/DL — SIGNIFICANT CHANGE UP (ref 8.4–10.5)
CALCIUM SERPL-MCNC: 9.9 MG/DL — SIGNIFICANT CHANGE UP (ref 8.4–10.5)
CHLORIDE SERPL-SCNC: 109 MMOL/L — HIGH (ref 98–107)
CHLORIDE SERPL-SCNC: 110 MMOL/L — HIGH (ref 98–107)
CHLORIDE SERPL-SCNC: 113 MMOL/L — HIGH (ref 98–107)
CHLORIDE SERPL-SCNC: 113 MMOL/L — HIGH (ref 98–107)
CHLORIDE SERPL-SCNC: 118 MMOL/L — HIGH (ref 98–107)
CO2 SERPL-SCNC: 17 MMOL/L — LOW (ref 22–31)
CO2 SERPL-SCNC: 19 MMOL/L — LOW (ref 22–31)
CO2 SERPL-SCNC: 19 MMOL/L — LOW (ref 22–31)
CO2 SERPL-SCNC: 21 MMOL/L — LOW (ref 22–31)
CO2 SERPL-SCNC: 21 MMOL/L — LOW (ref 22–31)
CREAT SERPL-MCNC: 0.81 MG/DL — SIGNIFICANT CHANGE UP (ref 0.5–1.3)
CREAT SERPL-MCNC: 0.92 MG/DL — SIGNIFICANT CHANGE UP (ref 0.5–1.3)
CREAT SERPL-MCNC: 0.98 MG/DL — SIGNIFICANT CHANGE UP (ref 0.5–1.3)
CREAT SERPL-MCNC: 1.01 MG/DL — SIGNIFICANT CHANGE UP (ref 0.5–1.3)
CREAT SERPL-MCNC: 1.02 MG/DL — SIGNIFICANT CHANGE UP (ref 0.5–1.3)
DRVVT SCREEN TO CONFIRM RATIO: 0.87 — SIGNIFICANT CHANGE UP (ref 0–1.2)
FACT II INHIB PPP-ACNC: 67.4 % — LOW (ref 75–135)
FACT IX PPP CHRO-ACNC: 149.4 % — SIGNIFICANT CHANGE UP (ref 52–150)
FACT VII ACT/NOR PPP: 75.5 % — SIGNIFICANT CHANGE UP (ref 70–165)
FACT VIII ACT/NOR PPP: 162.8 % — HIGH (ref 45–125)
FACT X ACT/NOR PPP: 73.5 % — SIGNIFICANT CHANGE UP (ref 70–150)
GLUCOSE SERPL-MCNC: 115 MG/DL — HIGH (ref 70–99)
GLUCOSE SERPL-MCNC: 125 MG/DL — HIGH (ref 70–99)
GLUCOSE SERPL-MCNC: 168 MG/DL — HIGH (ref 70–99)
GLUCOSE SERPL-MCNC: 176 MG/DL — HIGH (ref 70–99)
GLUCOSE SERPL-MCNC: 93 MG/DL — SIGNIFICANT CHANGE UP (ref 70–99)
HCT VFR BLD CALC: 33.9 % — LOW (ref 39–50)
HCYS SERPL-MCNC: 11.6 UMOL/L — SIGNIFICANT CHANGE UP
HGB BLD-MCNC: 10.8 G/DL — LOW (ref 13–17)
MAGNESIUM SERPL-MCNC: 1.4 MG/DL — LOW (ref 1.6–2.6)
MAGNESIUM SERPL-MCNC: 1.8 MG/DL — SIGNIFICANT CHANGE UP (ref 1.6–2.6)
MAGNESIUM SERPL-MCNC: 1.8 MG/DL — SIGNIFICANT CHANGE UP (ref 1.6–2.6)
MAGNESIUM SERPL-MCNC: 1.9 MG/DL — SIGNIFICANT CHANGE UP (ref 1.6–2.6)
MAGNESIUM SERPL-MCNC: 1.9 MG/DL — SIGNIFICANT CHANGE UP (ref 1.6–2.6)
MCHC RBC-ENTMCNC: 29.3 PG — SIGNIFICANT CHANGE UP (ref 27–34)
MCHC RBC-ENTMCNC: 31.9 % — LOW (ref 32–36)
MCV RBC AUTO: 92.1 FL — SIGNIFICANT CHANGE UP (ref 80–100)
NORMALIZED SCT PPP-RTO: 0.98 — SIGNIFICANT CHANGE UP (ref 0.85–1.33)
NRBC # FLD: 0 K/UL — SIGNIFICANT CHANGE UP (ref 0–0)
PHOSPHATE SERPL-MCNC: 1.7 MG/DL — LOW (ref 2.5–4.5)
PHOSPHATE SERPL-MCNC: 2.6 MG/DL — SIGNIFICANT CHANGE UP (ref 2.5–4.5)
PHOSPHATE SERPL-MCNC: 2.8 MG/DL — SIGNIFICANT CHANGE UP (ref 2.5–4.5)
PHOSPHATE SERPL-MCNC: 2.9 MG/DL — SIGNIFICANT CHANGE UP (ref 2.5–4.5)
PHOSPHATE SERPL-MCNC: 3.5 MG/DL — SIGNIFICANT CHANGE UP (ref 2.5–4.5)
PLATELET # BLD AUTO: 176 K/UL — SIGNIFICANT CHANGE UP (ref 150–400)
PMV BLD: 10.3 FL — SIGNIFICANT CHANGE UP (ref 7–13)
POTASSIUM SERPL-MCNC: 3.3 MMOL/L — LOW (ref 3.5–5.3)
POTASSIUM SERPL-MCNC: 3.3 MMOL/L — LOW (ref 3.5–5.3)
POTASSIUM SERPL-MCNC: 3.4 MMOL/L — LOW (ref 3.5–5.3)
POTASSIUM SERPL-MCNC: 3.5 MMOL/L — SIGNIFICANT CHANGE UP (ref 3.5–5.3)
POTASSIUM SERPL-MCNC: 3.6 MMOL/L — SIGNIFICANT CHANGE UP (ref 3.5–5.3)
POTASSIUM SERPL-SCNC: 3.3 MMOL/L — LOW (ref 3.5–5.3)
POTASSIUM SERPL-SCNC: 3.3 MMOL/L — LOW (ref 3.5–5.3)
POTASSIUM SERPL-SCNC: 3.4 MMOL/L — LOW (ref 3.5–5.3)
POTASSIUM SERPL-SCNC: 3.5 MMOL/L — SIGNIFICANT CHANGE UP (ref 3.5–5.3)
POTASSIUM SERPL-SCNC: 3.6 MMOL/L — SIGNIFICANT CHANGE UP (ref 3.5–5.3)
PROCALCITONIN SERPL-MCNC: 0.19 NG/ML — HIGH (ref 0.02–0.1)
PROT C ACT/NOR PPP: 116 % — SIGNIFICANT CHANGE UP (ref 74–150)
PROT SERPL-MCNC: 5.7 G/DL — LOW (ref 6–8.3)
RBC # BLD: 3.68 M/UL — LOW (ref 4.2–5.8)
RBC # FLD: 13.6 % — SIGNIFICANT CHANGE UP (ref 10.3–14.5)
RHEUMATOID FACT SERPL-ACNC: SIGNIFICANT CHANGE UP IU/ML (ref 0–13)
SODIUM SERPL-SCNC: 143 MMOL/L — SIGNIFICANT CHANGE UP (ref 135–145)
SODIUM SERPL-SCNC: 145 MMOL/L — SIGNIFICANT CHANGE UP (ref 135–145)
SODIUM SERPL-SCNC: 145 MMOL/L — SIGNIFICANT CHANGE UP (ref 135–145)
SODIUM SERPL-SCNC: 146 MMOL/L — HIGH (ref 135–145)
SODIUM SERPL-SCNC: 147 MMOL/L — HIGH (ref 135–145)
WBC # BLD: 12.24 K/UL — HIGH (ref 3.8–10.5)
WBC # FLD AUTO: 12.24 K/UL — HIGH (ref 3.8–10.5)

## 2020-02-20 PROCEDURE — 71045 X-RAY EXAM CHEST 1 VIEW: CPT | Mod: 26

## 2020-02-20 PROCEDURE — 93970 EXTREMITY STUDY: CPT | Mod: 26

## 2020-02-20 PROCEDURE — 99233 SBSQ HOSP IP/OBS HIGH 50: CPT | Mod: GC

## 2020-02-20 PROCEDURE — 76604 US EXAM CHEST: CPT | Mod: 26,GC

## 2020-02-20 PROCEDURE — 99291 CRITICAL CARE FIRST HOUR: CPT | Mod: 25

## 2020-02-20 PROCEDURE — 95720 EEG PHY/QHP EA INCR W/VEEG: CPT

## 2020-02-20 RX ORDER — PIPERACILLIN AND TAZOBACTAM 4; .5 G/20ML; G/20ML
3.38 INJECTION, POWDER, LYOPHILIZED, FOR SOLUTION INTRAVENOUS ONCE
Refills: 0 | Status: COMPLETED | OUTPATIENT
Start: 2020-02-20 | End: 2020-02-20

## 2020-02-20 RX ORDER — DEXMEDETOMIDINE HYDROCHLORIDE IN 0.9% SODIUM CHLORIDE 4 UG/ML
0.2 INJECTION INTRAVENOUS
Qty: 200 | Refills: 0 | Status: DISCONTINUED | OUTPATIENT
Start: 2020-02-20 | End: 2020-02-22

## 2020-02-20 RX ORDER — LABETALOL HCL 100 MG
10 TABLET ORAL ONCE
Refills: 0 | Status: COMPLETED | OUTPATIENT
Start: 2020-02-20 | End: 2020-02-20

## 2020-02-20 RX ORDER — MAGNESIUM SULFATE 500 MG/ML
1 VIAL (ML) INJECTION ONCE
Refills: 0 | Status: COMPLETED | OUTPATIENT
Start: 2020-02-20 | End: 2020-02-20

## 2020-02-20 RX ORDER — POTASSIUM CHLORIDE 20 MEQ
10 PACKET (EA) ORAL
Refills: 0 | Status: COMPLETED | OUTPATIENT
Start: 2020-02-20 | End: 2020-02-20

## 2020-02-20 RX ORDER — VANCOMYCIN HCL 1 G
1000 VIAL (EA) INTRAVENOUS EVERY 12 HOURS
Refills: 0 | Status: DISCONTINUED | OUTPATIENT
Start: 2020-02-20 | End: 2020-02-22

## 2020-02-20 RX ORDER — SODIUM,POTASSIUM PHOSPHATES 278-250MG
1 POWDER IN PACKET (EA) ORAL ONCE
Refills: 0 | Status: COMPLETED | OUTPATIENT
Start: 2020-02-20 | End: 2020-02-21

## 2020-02-20 RX ORDER — PIPERACILLIN AND TAZOBACTAM 4; .5 G/20ML; G/20ML
3.38 INJECTION, POWDER, LYOPHILIZED, FOR SOLUTION INTRAVENOUS EVERY 8 HOURS
Refills: 0 | Status: DISCONTINUED | OUTPATIENT
Start: 2020-02-20 | End: 2020-02-24

## 2020-02-20 RX ORDER — LEVETIRACETAM 250 MG/1
750 TABLET, FILM COATED ORAL EVERY 12 HOURS
Refills: 0 | Status: DISCONTINUED | OUTPATIENT
Start: 2020-02-21 | End: 2020-02-22

## 2020-02-20 RX ORDER — PIPERACILLIN AND TAZOBACTAM 4; .5 G/20ML; G/20ML
3.38 INJECTION, POWDER, LYOPHILIZED, FOR SOLUTION INTRAVENOUS EVERY 8 HOURS
Refills: 0 | Status: DISCONTINUED | OUTPATIENT
Start: 2020-02-20 | End: 2020-02-20

## 2020-02-20 RX ORDER — PIPERACILLIN AND TAZOBACTAM 4; .5 G/20ML; G/20ML
3.38 INJECTION, POWDER, LYOPHILIZED, FOR SOLUTION INTRAVENOUS ONCE
Refills: 0 | Status: DISCONTINUED | OUTPATIENT
Start: 2020-02-20 | End: 2020-02-20

## 2020-02-20 RX ORDER — POTASSIUM CHLORIDE 20 MEQ
40 PACKET (EA) ORAL ONCE
Refills: 0 | Status: COMPLETED | OUTPATIENT
Start: 2020-02-20 | End: 2020-02-21

## 2020-02-20 RX ORDER — LABETALOL HCL 100 MG
0.5 TABLET ORAL
Qty: 200 | Refills: 0 | Status: DISCONTINUED | OUTPATIENT
Start: 2020-02-20 | End: 2020-02-20

## 2020-02-20 RX ORDER — ACETAMINOPHEN 500 MG
1000 TABLET ORAL ONCE
Refills: 0 | Status: COMPLETED | OUTPATIENT
Start: 2020-02-20 | End: 2020-02-20

## 2020-02-20 RX ORDER — METOPROLOL TARTRATE 50 MG
5 TABLET ORAL ONCE
Refills: 0 | Status: COMPLETED | OUTPATIENT
Start: 2020-02-20 | End: 2020-02-20

## 2020-02-20 RX ORDER — LABETALOL HCL 100 MG
10 TABLET ORAL ONCE
Refills: 0 | Status: DISCONTINUED | OUTPATIENT
Start: 2020-02-20 | End: 2020-02-20

## 2020-02-20 RX ORDER — DILTIAZEM HCL 120 MG
16 CAPSULE, EXT RELEASE 24 HR ORAL ONCE
Refills: 0 | Status: COMPLETED | OUTPATIENT
Start: 2020-02-20 | End: 2020-02-20

## 2020-02-20 RX ADMIN — SODIUM CHLORIDE 30 MILLILITER(S): 5 INJECTION, SOLUTION INTRAVENOUS at 17:18

## 2020-02-20 RX ADMIN — PIPERACILLIN AND TAZOBACTAM 200 GRAM(S): 4; .5 INJECTION, POWDER, LYOPHILIZED, FOR SOLUTION INTRAVENOUS at 11:52

## 2020-02-20 RX ADMIN — Medication 250 MILLIGRAM(S): at 17:17

## 2020-02-20 RX ADMIN — NICARDIPINE HYDROCHLORIDE 25 MG/HR: 30 CAPSULE, EXTENDED RELEASE ORAL at 17:17

## 2020-02-20 RX ADMIN — Medication 100 GRAM(S): at 06:52

## 2020-02-20 RX ADMIN — PIPERACILLIN AND TAZOBACTAM 25 GRAM(S): 4; .5 INJECTION, POWDER, LYOPHILIZED, FOR SOLUTION INTRAVENOUS at 19:38

## 2020-02-20 RX ADMIN — SODIUM CHLORIDE 30 MILLILITER(S): 5 INJECTION, SOLUTION INTRAVENOUS at 19:14

## 2020-02-20 RX ADMIN — Medication 1000 MILLIGRAM(S): at 21:30

## 2020-02-20 RX ADMIN — SODIUM CHLORIDE 30 MILLILITER(S): 5 INJECTION, SOLUTION INTRAVENOUS at 09:51

## 2020-02-20 RX ADMIN — CHLORHEXIDINE GLUCONATE 15 MILLILITER(S): 213 SOLUTION TOPICAL at 17:17

## 2020-02-20 RX ADMIN — NICARDIPINE HYDROCHLORIDE 25 MG/HR: 30 CAPSULE, EXTENDED RELEASE ORAL at 09:50

## 2020-02-20 RX ADMIN — Medication 5 MILLIGRAM(S): at 11:27

## 2020-02-20 RX ADMIN — Medication 16 MILLIGRAM(S): at 17:47

## 2020-02-20 RX ADMIN — Medication 400 MILLIGRAM(S): at 21:00

## 2020-02-20 RX ADMIN — FENTANYL CITRATE 6.74 MICROGRAM(S)/KG/HR: 50 INJECTION INTRAVENOUS at 09:51

## 2020-02-20 RX ADMIN — Medication 400 MILLIGRAM(S): at 09:05

## 2020-02-20 RX ADMIN — Medication 10 MILLIGRAM(S): at 11:51

## 2020-02-20 RX ADMIN — CHLORHEXIDINE GLUCONATE 15 MILLILITER(S): 213 SOLUTION TOPICAL at 05:42

## 2020-02-20 RX ADMIN — Medication 100 MILLIEQUIVALENT(S): at 01:07

## 2020-02-20 RX ADMIN — DEXMEDETOMIDINE HYDROCHLORIDE IN 0.9% SODIUM CHLORIDE 3.37 MICROGRAM(S)/KG/HR: 4 INJECTION INTRAVENOUS at 19:15

## 2020-02-20 RX ADMIN — NICARDIPINE HYDROCHLORIDE 25 MG/HR: 30 CAPSULE, EXTENDED RELEASE ORAL at 19:15

## 2020-02-20 RX ADMIN — Medication 5 MILLIGRAM(S): at 08:23

## 2020-02-20 RX ADMIN — DEXMEDETOMIDINE HYDROCHLORIDE IN 0.9% SODIUM CHLORIDE 3.37 MICROGRAM(S)/KG/HR: 4 INJECTION INTRAVENOUS at 17:17

## 2020-02-20 RX ADMIN — Medication 2 MILLIGRAM(S): at 21:00

## 2020-02-20 RX ADMIN — Medication 10 MILLIGRAM(S): at 10:00

## 2020-02-20 RX ADMIN — LEVETIRACETAM 400 MILLIGRAM(S): 250 TABLET, FILM COATED ORAL at 17:17

## 2020-02-20 RX ADMIN — LEVETIRACETAM 400 MILLIGRAM(S): 250 TABLET, FILM COATED ORAL at 05:42

## 2020-02-20 RX ADMIN — CHLORHEXIDINE GLUCONATE 1 APPLICATION(S): 213 SOLUTION TOPICAL at 11:52

## 2020-02-20 RX ADMIN — Medication 100 MILLIEQUIVALENT(S): at 04:35

## 2020-02-20 RX ADMIN — Medication 100 MILLIEQUIVALENT(S): at 02:30

## 2020-02-20 NOTE — PROGRESS NOTE ADULT - SUBJECTIVE AND OBJECTIVE BOX
CHIEF COMPLAINT: Patient is a 40y old  Male who presents with a chief complaint of   Interval Events:    REVIEW OF SYSTEMS:  Constitutional:   Eyes:  ENT:  CV:  Resp:  GI:  :  MSK:  Integumentary:  Neurological:  Psychiatric:  Endocrine:  Hematologic/Lymphatic:  Allergic/Immunologic:  [ ] All other systems negative  [ ] Unable to assess ROS because ________    OBJECTIVE:  ICU Vital Signs Last 24 Hrs  T(C): 37.3 (2020 04:00), Max: 38.5 (2020 16:00)  T(F): 99.2 (2020 04:00), Max: 101.3 (2020 16:00)  HR: 119 (2020 06:45) (53 - 148)  BP: 138/92 (2020 06:30) (109/82 - 191/121)  BP(mean): 104 (2020 06:30) (91 - 143)  ABP: --  ABP(mean): --  RR: 15 (2020 06:45) (13 - 26)  SpO2: 98% (2020 06:45) (95% - 100%)    Mode: AC/ CMV (Assist Control/ Continuous Mandatory Ventilation), RR (machine): 15, TV (machine): 450, FiO2: 30, PEEP: 5, MAP: 8, PIP: 16    02-19 @ 07:01  -  02-20 @ 07:00  --------------------------------------------------------  IN: 1399.7 mL / OUT: 1700 mL / NET: -300.3 mL      CAPILLARY BLOOD GLUCOSE          PHYSICAL EXAM:  General:   HEENT:   Lymph Nodes:  Neck:   Respiratory:   Cardiovascular:   Abdomen:   Extremities:   Skin:   Neurological:  Psychiatry:    HOSPITAL MEDICATIONS:  MEDICATIONS  (STANDING):  chlorhexidine 0.12% Liquid 15 milliLiter(s) Oral Mucosa every 12 hours  chlorhexidine 4% Liquid 1 Application(s) Topical <User Schedule>  fentaNYL   Infusion. 1 MICROgram(s)/kG/Hr (6.74 mL/Hr) IV Continuous <Continuous>  levETIRAcetam  IVPB 500 milliGRAM(s) IV Intermittent every 12 hours  niCARdipine Infusion 5 mG/Hr (25 mL/Hr) IV Continuous <Continuous>  sodium chloride 3%. 500 milliLiter(s) (30 mL/Hr) IV Continuous <Continuous>    MEDICATIONS  (PRN):      LABS:  ( @ 04:00)                        10.8  12.24 )-----------( 176                 33.9    Neutrophils = -- (--%)  Lymphocytes = -- (--%)  Eosinophils = -- (--%)  Basophils = -- (--%)  Monocytes = -- (--%)  Bands = --%    WBC Trend: 12.24<--, 8.22<--, 7.79<--  Hb Trend: 10.8<--, 14.9<--, 12.7<--  Plt Trend: 176<--, 264<--, 244<--  20    146<H>  |  118<H>  |  10  ----------------------------<  93  3.3<L>   |  17<L>  |  0.81    Ca    7.7<L>      2020 04:00  Phos  2.8       Mg     1.4     20    TPro  5.7<L>  /  Alb  3.0<L>  /  TBili  0.5  /  DBili  x   /  AST  9   /  ALT  12  /  AlkPhos  81  20    Creatinine Trend: 0.81<--, 0.98<--, 1.07<--, 1.03<--, 1.01<--, 0.98<--  PT/INR - ( 2020 18:20 )   PT: 11.2 SEC;   INR: 0.98          PTT - ( 2020 18:20 )  PTT:38.2 SEC  Urinalysis Basic - ( 2020 13:15 )    Color: LIGHT YELLOWA / Appearance: CLEAR / S.023 / pH: 6.5  Gluc: NEGATIVE / Ketone: NEGATIVE  / Bili: NEGATIVE / Urobili: NORMAL   Blood: NEGATIVE / Protein: 50 / Nitrite: NEGATIVE   Leuk Esterase: NEGATIVE / RBC: 0-2 / WBC 0-2   Sq Epi: OCC / Non Sq Epi: x / Bacteria: NEGATIVE      Arterial Blood Gas:   @ 02:45  7.48/34/103/26/98.5/1.5  ABG lactate: 1.0  Arterial Blood Gas:   @ 00:30  7.51/31/161/26/99.6/1.4  ABG lactate: 1.5    Venous Blood Gas:   @ 18:20  7.48/35/74/27/96.0  VBG Lactate: 1.9          MICROBIOLOGY:   Blood Cx:  Urine Cx:  Sputum Cx:  Legionella:  RVP:    RADIOLOGY:  X Ray:  CT:  MRI:  Ultrasound:  [ ] Reviewed and interpreted by me    EKG: CHIEF COMPLAINT: Patient is a 40y old  Male who presents with a chief complaint of   Interval Events: Patient seen and examined at bedside. SANYA.      REVIEW OF SYSTEMS:  Constitutional:   Eyes:  ENT:  CV:  Resp:  GI:  :  MSK:  Integumentary:  Neurological:  Psychiatric:  Endocrine:  Hematologic/Lymphatic:  Allergic/Immunologic:  [ ] All other systems negative  [ ] Unable to assess ROS because ________    OBJECTIVE:  ICU Vital Signs Last 24 Hrs  T(C): 37.3 (2020 04:00), Max: 38.5 (2020 16:00)  T(F): 99.2 (2020 04:00), Max: 101.3 (2020 16:00)  HR: 119 (2020 06:45) (53 - 148)  BP: 138/92 (2020 06:30) (109/82 - 191/121)  BP(mean): 104 (2020 06:30) (91 - 143)  ABP: --  ABP(mean): --  RR: 15 (2020 06:45) (13 - 26)  SpO2: 98% (2020 06:45) (95% - 100%)    Mode: AC/ CMV (Assist Control/ Continuous Mandatory Ventilation), RR (machine): 15, TV (machine): 450, FiO2: 30, PEEP: 5, MAP: 8, PIP: 16    02-19 @ 07:01  -  02-20 @ 07:00  --------------------------------------------------------  IN: 1399.7 mL / OUT: 1700 mL / NET: -300.3 mL      CAPILLARY BLOOD GLUCOSE          PHYSICAL EXAM:  General:   HEENT:   Lymph Nodes:  Neck:   Respiratory:   Cardiovascular:   Abdomen:   Extremities:   Skin:   Neurological:  Psychiatry:    HOSPITAL MEDICATIONS:  MEDICATIONS  (STANDING):  chlorhexidine 0.12% Liquid 15 milliLiter(s) Oral Mucosa every 12 hours  chlorhexidine 4% Liquid 1 Application(s) Topical <User Schedule>  fentaNYL   Infusion. 1 MICROgram(s)/kG/Hr (6.74 mL/Hr) IV Continuous <Continuous>  levETIRAcetam  IVPB 500 milliGRAM(s) IV Intermittent every 12 hours  niCARdipine Infusion 5 mG/Hr (25 mL/Hr) IV Continuous <Continuous>  sodium chloride 3%. 500 milliLiter(s) (30 mL/Hr) IV Continuous <Continuous>    MEDICATIONS  (PRN):      LABS:  ( @ 04:00)                        10.8  12.24 )-----------( 176                 33.9    Neutrophils = -- (--%)  Lymphocytes = -- (--%)  Eosinophils = -- (--%)  Basophils = -- (--%)  Monocytes = -- (--%)  Bands = --%    WBC Trend: 12.24<--, 8.22<--, 7.79<--  Hb Trend: 10.8<--, 14.9<--, 12.7<--  Plt Trend: 176<--, 264<--, 244<--      146<H>  |  118<H>  |  10  ----------------------------<  93  3.3<L>   |  17<L>  |  0.81    Ca    7.7<L>      2020 04:00  Phos  2.8     20  Mg     1.4         TPro  5.7<L>  /  Alb  3.0<L>  /  TBili  0.5  /  DBili  x   /  AST  9   /  ALT  12  /  AlkPhos  81      Creatinine Trend: 0.81<--, 0.98<--, 1.07<--, 1.03<--, 1.01<--, 0.98<--  PT/INR - ( 2020 18:20 )   PT: 11.2 SEC;   INR: 0.98          PTT - ( 2020 18:20 )  PTT:38.2 SEC  Urinalysis Basic - ( 2020 13:15 )    Color: LIGHT YELLOWA / Appearance: CLEAR / S.023 / pH: 6.5  Gluc: NEGATIVE / Ketone: NEGATIVE  / Bili: NEGATIVE / Urobili: NORMAL   Blood: NEGATIVE / Protein: 50 / Nitrite: NEGATIVE   Leuk Esterase: NEGATIVE / RBC: 0-2 / WBC 0-2   Sq Epi: OCC / Non Sq Epi: x / Bacteria: NEGATIVE      Arterial Blood Gas:   @ 02:45  7.48/34/103/26/98.5/1.5  ABG lactate: 1.0  Arterial Blood Gas:   @ 00:30  7.51/31/161/26/99.6/1.4  ABG lactate: 1.5    Venous Blood Gas:   @ 18:20  7.48/35/74/27/96.0  VBG Lactate: 1.9          MICROBIOLOGY:   Blood Cx:  Urine Cx:  Sputum Cx:  Legionella:  RVP:    RADIOLOGY:  X Ray:  CT:  MRI:  Ultrasound:  [ ] Reviewed and interpreted by me    EKG: CHIEF COMPLAINT: Patient is a 40y old  Male who presents with a chief complaint of   Interval Events: Patient seen and examined at bedside. NAEO. Patient febrile o/n as well as this morning. Given tylenol to treat the fever. Patient had been coming down on fentanyl gtt but became restless/agitated with tachycardia and HTN, so fentanyl gtt increased. Now coming down on sedation again. Trialing CPAP this morning.      REVIEW OF SYSTEMS:  Constitutional:   Eyes:  ENT:  CV:  Resp:  GI:  :  MSK:  Integumentary:  Neurological:  Psychiatric:  Endocrine:  Hematologic/Lymphatic:  Allergic/Immunologic:  [ ] All other systems negative  [x] Unable to assess ROS because intubated    OBJECTIVE:  ICU Vital Signs Last 24 Hrs  T(C): 37.3 (2020 04:00), Max: 38.5 (2020 16:00)  T(F): 99.2 (2020 04:00), Max: 101.3 (2020 16:00)  HR: 119 (2020 06:45) (53 - 148)  BP: 138/92 (2020 06:30) (109/82 - 191/121)  BP(mean): 104 (2020 06:30) (91 - 143)  ABP: --  ABP(mean): --  RR: 15 (2020 06:45) (13 - 26)  SpO2: 98% (2020 06:45) (95% - 100%)    Mode: AC/ CMV (Assist Control/ Continuous Mandatory Ventilation), RR (machine): 15, TV (machine): 450, FiO2: 30, PEEP: 5, MAP: 8, PIP: 16    -19 @ 07:01  -  02-20 @ 07:00  --------------------------------------------------------  IN: 1399.7 mL / OUT: 1700 mL / NET: -300.3 mL      CAPILLARY BLOOD GLUCOSE          PHYSICAL EXAM:  General: intubated and sedated at time of exam  HEENT: pupils equal and sluggishly reactive to light  Neck: supple  Respiratory: intubated at time of exam, CTA b/l, no wheezes/rales/rhonchi  Cardiovascular: tachycardic, normal S1,S2, no m/g/r  Abdomen: soft, NT/ND, +BS  Extremities: 2+ radial/DP pulses, no lower extremity edema  Skin: WWP  Neurological: sedated at time of exam but awakens during exam and becomes restless, follows simple commands    HOSPITAL MEDICATIONS:  MEDICATIONS  (STANDING):  chlorhexidine 0.12% Liquid 15 milliLiter(s) Oral Mucosa every 12 hours  chlorhexidine 4% Liquid 1 Application(s) Topical <User Schedule>  fentaNYL   Infusion. 1 MICROgram(s)/kG/Hr (6.74 mL/Hr) IV Continuous <Continuous>  levETIRAcetam  IVPB 500 milliGRAM(s) IV Intermittent every 12 hours  niCARdipine Infusion 5 mG/Hr (25 mL/Hr) IV Continuous <Continuous>  sodium chloride 3%. 500 milliLiter(s) (30 mL/Hr) IV Continuous <Continuous>    MEDICATIONS  (PRN):      LABS:  ( @ 04:00)                        10.8  12.24 )-----------( 176                 33.9    Neutrophils = -- (--%)  Lymphocytes = -- (--%)  Eosinophils = -- (--%)  Basophils = -- (--%)  Monocytes = -- (--%)  Bands = --%    WBC Trend: 12.24<--, 8.22<--, 7.79<--  Hb Trend: 10.8<--, 14.9<--, 12.7<--  Plt Trend: 176<--, 264<--, 244<--      146<H>  |  118<H>  |  10  ----------------------------<  93  3.3<L>   |  17<L>  |  0.81    Ca    7.7<L>      2020 04:00  Phos  2.8       Mg     1.4         TPro  5.7<L>  /  Alb  3.0<L>  /  TBili  0.5  /  DBili  x   /  AST  9   /  ALT  12  /  AlkPhos  81      Creatinine Trend: 0.81<--, 0.98<--, 1.07<--, 1.03<--, 1.01<--, 0.98<--  PT/INR - ( 2020 18:20 )   PT: 11.2 SEC;   INR: 0.98          PTT - ( 2020 18:20 )  PTT:38.2 SEC  Urinalysis Basic - ( 2020 13:15 )    Color: LIGHT YELLOWA / Appearance: CLEAR / S.023 / pH: 6.5  Gluc: NEGATIVE / Ketone: NEGATIVE  / Bili: NEGATIVE / Urobili: NORMAL   Blood: NEGATIVE / Protein: 50 / Nitrite: NEGATIVE   Leuk Esterase: NEGATIVE / RBC: 0-2 / WBC 0-2   Sq Epi: OCC / Non Sq Epi: x / Bacteria: NEGATIVE      Arterial Blood Gas:   @ 02:45  7.48/34/103/26/98.5/1.5  ABG lactate: 1.0  Arterial Blood Gas:   @ 00:30  7.51/31/161/26/99.6/1.4  ABG lactate: 1.5    Venous Blood Gas:   @ 18:20  7.48/35/74/27/96.0  VBG Lactate: 1.9          MICROBIOLOGY:   Blood Cx:  Urine Cx:  Sputum Cx:  Legionella:  RVP:    RADIOLOGY:  X Ray:  CT: repeat CT head on  showed stable intracerebral hemorrhage  MRI:  Ultrasound:  [x] Reviewed and interpreted by me    EKG:

## 2020-02-20 NOTE — PROGRESS NOTE ADULT - SUBJECTIVE AND OBJECTIVE BOX
Neurology Progress Note    Subjective: Interval History - No events overnight    Objective:   Vital Signs Last 24 Hrs  T(C): 37.3 (20 Feb 2020 12:00), Max: 38.5 (19 Feb 2020 16:00)  T(F): 99.2 (20 Feb 2020 12:00), Max: 101.3 (19 Feb 2020 16:00)  HR: 146 (20 Feb 2020 13:00) (53 - 158)  BP: 179/109 (20 Feb 2020 13:00) (119/79 - 187/127)  BP(mean): 124 (20 Feb 2020 13:00) (91 - 143)  RR: 25 (20 Feb 2020 13:00) (12 - 32)  SpO2: 94% (20 Feb 2020 13:00) (94% - 100%)    General Exam:   General appearance: No acute distress                   Neurological Exam:  Mental Status: Lethargic. Eyes closed. Responds to voice by eye opening. Follows some simple 1 step commands.       Cranial Nerves: Limited gaze in bi-horizontal plane, no nystagmus. +Left facial droop. Tongue midline.  Sternocleidomastoid and Trapezius intact bilaterally.    Motor:   Tone: Increased tone in the LUE.                   Strength: LUE withdraws to noxious stimuli. RUE spontaneous movement. (Exam limited due to mental status)  Tremor: No resting, postural or action tremor.  No myoclonus.  Sensation: Grimaces to noxious stimuli in the RUE/RLE.     Deep Tendon Reflexes: Left: 3+ biceps, triceps, brachioradialis, knee and ankle  	                      Right: 3+ biceps, triceps, brachioradialis, knee and ankle  +R plantar upgoing    Gait: Unable to assess.     Other:    02-20    145  |  113<H>  |  11  ----------------------------<  125<H>  3.6   |  19<L>  |  0.92    Ca    9.0      20 Feb 2020 08:40  Phos  2.9     02-20  Mg     1.9     02-20    TPro  5.7<L>  /  Alb  3.0<L>  /  TBili  0.5  /  DBili  x   /  AST  9   /  ALT  12  /  AlkPhos  81  02-20    LIVER FUNCTIONS - ( 20 Feb 2020 04:00 )  Alb: 3.0 g/dL / Pro: 5.7 g/dL / ALK PHOS: 81 u/L / ALT: 12 u/L / AST: 9 u/L / GGT: x                                 10.8   12.24 )-----------( 176      ( 20 Feb 2020 04:00 )             33.9       MEDICATIONS  (STANDING):  chlorhexidine 0.12% Liquid 15 milliLiter(s) Oral Mucosa every 12 hours  chlorhexidine 4% Liquid 1 Application(s) Topical <User Schedule>  dexMEDEtomidine Infusion 0.2 MICROgram(s)/kG/Hr (3.37 mL/Hr) IV Continuous <Continuous>  diltiazem Injectable 16 milliGRAM(s) IV Push once  labetalol Infusion 0.5 mG/Min (30 mL/Hr) IV Continuous <Continuous>  levETIRAcetam  IVPB 500 milliGRAM(s) IV Intermittent every 12 hours  niCARdipine Infusion 5 mG/Hr (25 mL/Hr) IV Continuous <Continuous>  piperacillin/tazobactam IVPB.. 3.375 Gram(s) IV Intermittent every 8 hours  sodium chloride 3%. 500 milliLiter(s) (30 mL/Hr) IV Continuous <Continuous>  vancomycin  IVPB 1000 milliGRAM(s) IV Intermittent every 12 hours    MEDICATIONS  (PRN):    < from: CT Head No Cont (02.19.20 @ 15:35) >  Impression: Acute parenchymal hemorrhage is again seen as described above.  Intraventricular hemorrhage is again seen though less conspicuous. This is compatible expected evolutionary changes.  < end of copied text >    < from: CT Angio Neck w/ IV Cont (02.18.20 @ 15:22) >  IMPRESSION:  Normal arterial findings  < end of copied text >    EEG SUMMARY/CLASSIFICATION    Abnormal EEG in an altered patient.  - Occasional to frequent right parietal (P4) sharp wave discharges, at times occurred as very brief runs of static 1 Hz lateralized periodic discharges with rhythmicity (LPD+R).  - Continuous polymorphic delta slowing in the right hemisphere.   - Attenuation of fast activity in the right hemisphere.  - Mild generalized slowing.  - Breach effect in right hemisphere, max parietally, characterized by higher amplitude and sharply contoured waves.   _____________________________________________________________  EEG IMPRESSION/CLINICAL CORRELATE    Abnormal EEG study.  1. Potential epileptogenic focus in the right parietal region.   2. Cortical dysfunction and structural abnormality in the right hemisphere.  3. Mild nonspecific diffuse or multifocal cerebral dysfunction.   4. No seizure seen.  5. 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

## 2020-02-20 NOTE — CHART NOTE - NSCHARTNOTEFT_GEN_A_CORE
EEG continues to show frequent sharp waves originating from the R parietal lobe; no seizures seen. Recommend increasing Keppra from 500 BID to 750 BID and continuing EEG monitoring. Discussed with MICU resident. EEG continues to show frequent sharp waves originating from the R parietal lobe; no seizures seen. Recommend increasing Keppra from 500 BID to 750 BID and continuing EEG monitoring. Discussed with MICU resident.    Case was discussed with Neurology Attending, Dr. Uriostegui who agrees with above plan.

## 2020-02-20 NOTE — PROGRESS NOTE ADULT - ASSESSMENT
40 year old male with PMHx of prior R BG infarct (with R on CT head at [facility]), HTN who presents as a transfer from [facility] for L thalamic bleed (approx 1.5 cm on 2 pm 2/18 scan with vasogenic edema) with IVH, CTA H&N unremarkable. He has a persistent L sided deficit at baseline, and lives with father. On day of presentation father noted that pt was not talking and became obtunded 5 min before EMS arrived. Prior to arrival to [facility], he was witnessed to have 1 GTC, and was intubated after. He is not on AC or antiplt, and home meds include lisinopril, labetalol and amlodipine. No recent illnesses, no trauma. He was transferred to [facility] (no bed availability at [facility]) for vEEG and further management  ICH score 3, MRS unknown.     Repeat CTH stable. Extubated on 2/20/2020.   vEEG reviewed: no evidence of seizures.      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     Plan:  [] MRI brain w + w/o contrast  [] Can hold vEEG for now  [] Q1H neuro checks  [] SBP goal < 160  [] Continue Keppra 500mg BID  [] TTE with bubble, hypercoaguable workup, venous dopplers of the lower extremities.   [] Labs for primary autoimmune condition pending result (C-ANCA/P-ANCA/ESR/CRP/LYNSEY/RF/DsDNA)  [] Coagulation disorder labs 40 year old male with PMHx of prior R BG infarct (with R on CT head at [facility]), HTN who presents as a transfer from [facility] for L thalamic bleed (approx 1.5 cm on 2 pm 2/18 scan with vasogenic edema) with IVH, CTA H&N unremarkable. He has a persistent L sided deficit at baseline, and lives with father. On day of presentation father noted that pt was not talking and became obtunded 5 min before EMS arrived. Prior to arrival to [facility], he was witnessed to have 1 GTC, and was intubated after. He is not on AC or antiplt, and home meds include lisinopril, labetalol and amlodipine. No recent illnesses, no trauma. He was transferred to [facility] (no bed availability at [facility]) for vEEG and further management  ICH score 3, MRS unknown.     Repeat CTH stable. Extubated on 2/20/2020.   vEEG reviewed: no evidence of seizures.      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     Plan:  [] MRI brain w + w/o contrast  [] Can hold vEEG for now  [] Q1H neuro checks  [] SBP goal < 160  [] Continue Keppra 500mg BID  [] TTE with bubble, hypercoaguable workup, venous dopplers of the lower extremities.   [] Labs for primary autoimmune condition pending result (C-ANCA/P-ANCA/ESR/CRP/LYNSEY/RF/DsDNA)  [] Coagulation disorder labs

## 2020-02-20 NOTE — PROGRESS NOTE ADULT - ATTENDING COMMENTS
Patient seen and examined with neurology team and above note reviewed and I agree with assessment and plan as outlined. Patient now extubated and opens eyes and attempts to mouth words and does follow some basic requests.   He has left sided spastic weakness and increased reflexes bilaterally with a positive right babinski sign.  No other pathological reflexes elicited.    VEEG in progress and awaiting official read  MRI brain pending   Labs of vasculitis etc also pending    Impression and Plan: patient with left thalamic hemorrhage may be secondary to uncontrolled HTN however given his young age must rule out other potential underlying etiologies.   Await MRI and vasculitic and inflammatory labs  JENNA with bubble study and hypercoaguable labs   Continue keppra 500mg BID  continue MICU management and supportive care.

## 2020-02-20 NOTE — PROGRESS NOTE ADULT - ATTENDING COMMENTS
Agree with above.  Patient seen and examined. Chart reviewed.    40 year old man with thalamic bleed, hypertension, ? seizure, respiratory failure     - wakes up able to follow simple commands  - febrile ? aspiration event at time of seizures now on ABx cultures pending  - Cardene drip for BP control  - continue 3% saline  - EEG no active seizure noted  - extubated  - BP and HR control

## 2020-02-20 NOTE — PROGRESS NOTE ADULT - ASSESSMENT
40 y/o M w/ Mhx HTN, T2DM, prior ETOH and cocaine abuse recently hospitalized for L basal ganglia hemorrhagic stroke (with residual LUE and LLE weakness) transferred from  where he presented with AMS/obtundation preceded by dizziness, HA, and worsening left sided weakness found to have ICH, intubated for airway protection w/ hospital course c/b LLE shaking presumed to be seizure requiring keppra and ativan. Patient was transferred to [facility] for neuro eval and EEG.     NEURO  Hx of L basal ganglia hemorrhagic stroke, L thalamus ischemic stroke and R parietal density no p/w worsening mental status found to have 1.5 cm acute L thalamic IPH with vasogenic edema  - pend read for 24h CT  - elevated HOB 30 degrees   - keppra 500 mg bid  - c/w cardine for strict BP control, Goal SBP <160  - q1h neuro checks  - c/w 3% saline to keep Na 145-150  - video EEG  - MRI brain w, w/o when able  - f/u TTE  - f/u LE duplex  - f/u hypercoagulable work up   - f/u neuro and neurosurgery recs     CARDIAC: h/o HTN  - c/w cardine drip goal SBP < 160    RESPIRATORY  -intubated; will wean off sedation for possible extubation tomorrow      ID  -no active issues     GI  -NPO for now      ENDO  -no acute issues  - ISS; A1C 4.9    RENAL  -hypertonic saline 3% started at 30 cc/hr to keep goal Na 145-150  -BMP q4h    HEME/ONC:  -DVT PPX- SCDs 40 y/o M w/ Mhx HTN, T2DM, prior ETOH and cocaine abuse with history of recent R basal ganglia hemorrhagic stroke (with residual LUE and LLE weakness), L thalamic ischemic stroke, and R parietal density transferred from  where he presented with AMS/obtundation preceded by dizziness, HA, and worsening left sided weakness found to have ICH, intubated for airway protection w/ hospital course c/b LLE shaking presumed to be seizure requiring keppra and ativan. Patient was transferred to [facility] for neuro eval and EEG, now extubated and off sedation. CT showing stable hemorrhage, currently undergoing workup for hemorrhagic stroke etiology.    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  - MRI brain w, w/o when able  - TTE showed increased LV wall thickness, bubble study was unsuccessful due to poor visualization  - f/u LE duplex  - f/u hypercoagulable work up   - f/u neuro and neurosurgery recs     CARDIAC: h/o HTN  - c/w cardine drip goal SBP < 160  - has been having tachy/HTN, managing with anti-hypertensives as needed    RESPIRATORY  -weaned off sedation and extubated  -starting vanc/zosyn for possible aspiration PNA    ID  -febrile o/n with new leukocytosis  -starting vanc/zosyn for possible aspiration PNA  -fu procalcitonin    GI  -NPO for now    ENDO  -no acute issues  - ISS; A1C 4.9    RENAL  -hypertonic saline 3% started at 30 cc/hr to keep goal Na 145-150  -BMP q4h    HEME/ONC:  -DVT PPX- SCDs

## 2020-02-20 NOTE — CHART NOTE - NSCHARTNOTEFT_GEN_A_CORE
Patient noted by nursing staff to have episodes of leftward gaze followed by unresponsiveness a/w LLE shaking. Events were recorded/marked on EEG. Spoke to epileptologist who read the

## 2020-02-20 NOTE — EEG REPORT - NS EEG TEXT BOX
[facility] EPILEPSY CENTER   REPORT OF CONTINUOUS VIDEO EEG     [facility]: [address], [place] [address]  [facility]: [address] [address]  [facility]: [address] [place] [address]    Patient Name: LUIS EDUARDO FRANKEL  Age and : 40y (82)  MRN #: [id number]  Location: [place] [address]  Referring Physician: Cristiane Ernst    Start Time/Date: 17:55 on 20  End Time/Date: 08:00 on 20  Duration: 14hr 03m    _____________________________________________________________  STUDY INFORMATION    EEG Recording Technique:  The patient underwent continuous Video-EEG monitoring, using Telemetry System hardware on the XLTek Digital System. EEG and video data were stored on a computer hard drive with important events saved in digital archive files. The material was reviewed by a physician (electroencephalographer / epileptologist) on a daily basis. Christopher and seizure detection algorithms were utilized and reviewed. An EEG Technician attended to the patient, and was available throughout daytime work hours.  The epilepsy center neurologist was available in person or on call 24-hours per day.    EEG Placement and Labeling of Electrodes:  The EEG was performed utilizing 20 channel referential EEG connections (coronal over temporal over parasagittal montage) using all standard 10-20 electrode placements with EKG, with additional electrodes placed in the inferior temporal region using the modified 10-10 montage electrode placements for elective admissions, or if deemed necessary. Recording was at a sampling rate of 256 samples per second per channel. Time synchronized digital video recording was done simultaneously with EEG recording. A low light infrared camera was used for low light recording.     _____________________________________________________________  HISTORY    Patient is a 40y old  Male who presents with a chief complaint of AMS.    PERTINENT MEDICATION:  levETIRAcetam  IVPB 500 milliGRAM(s) IV Intermittent every 12 hours    _____________________________________________________________  STUDY INTERPRETATION    Findings: The background was continuous, spontaneously variable and reactive. During wakefulness, the posterior dominant rhythm consisted of asymmetric, poorly-modulated 8-9 Hz activity, with amplitude to 30 uV, that was seen over the left hemisphere.     Background Slowing:  Excess diffuse polymorphic delta slowing.     Focal Slowing:   Continuous polymorphic delta slowing in the right hemisphere.     Sleep Background:  Drowsiness was characterized by fragmentation, attenuation, and slowing of the background activity.    Sleep was characterized by the presence of asymmetric, rudimentary spindles and K-complexes seen over the left hemisphere only.    Other Non-Epileptiform Findings:  Attenuation of fast activity in the right hemisphere.  Breach effect in right hemisphere, max parietally, characterized by higher amplitude and sharply contoured waves.     Interictal Epileptiform Activity:   Occasional to frequent right parietal (P4) sharp wave discharges, at times occurred as very brief runs of static 1 Hz lateralized periodic discharges with rhythmicity (LPD+R).    Events:  Clinical events: None recorded.  Seizures: None recorded.    Activation Procedures:   Hyperventilation was not performed.    Photic stimulation was performed and did not elicit any abnormality.     Artifacts:  Intermittent myogenic and movement artifacts were noted.    ECG:  The heart rate on single channel ECG was predominantly between 60-70 BPM.    _____________________________________________________________  EEG SUMMARY/CLASSIFICATION    Abnormal EEG in an altered patient.  - Occasional to frequent right parietal (P4) sharp wave discharges, at times occurred as very brief runs of static 1 Hz lateralized periodic discharges with rhythmicity (LPD+R).  - Continuous polymorphic delta slowing in the right hemisphere.   - Attenuation of fast activity in the right hemisphere.  - Mild generalized slowing.  - Breach effect in right hemisphere, max parietally, characterized by higher amplitude and sharply contoured waves.   _____________________________________________________________  EEG IMPRESSION/CLINICAL CORRELATE    Abnormal EEG study.  1. Potential epileptogenic focus in the right parietal region.   2. Cortical dysfunction and structural abnormality in the right hemisphere.  3. Mild nonspecific diffuse or multifocal cerebral dysfunction.   4. No seizure seen.  5. 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     _____________________________________________________________    Martinez Colunga MD  Attending Physician, [facility] Epilepsy [place]

## 2020-02-20 NOTE — CHART NOTE - NSCHARTNOTEFT_GEN_A_CORE
: Amado Snyder     INDICATION: intubated; concern for aspiration pneumonia     PROCEDURE:  [ x] LIMITED ECHO  [ x] LIMITED CHEST  [ ] LIMITED RETROPERITONEAL  [ ] LIMITED ABDOMINAL  [ ] LIMITED DVT  [ ] NEEDLE GUIDANCE VASCULAR  [ ] NEEDLE GUIDANCE THORACENTESIS  [ ] NEEDLE GUIDANCE PARACENTESIS  [ ] NEEDLE GUIDANCE PERICARDIOCENTESIS  [ ] OTHER    FINDINGS:  -scattered basilar B lines on L   -no consolidation b/l   -poor visualization of cardiac windows but grossly normal LVSF       INTERPRETATION:    Overall normal lung and cardiac exam.     Images uploaded on [employer] : Amado Snyder     INDICATION: intubated; concern for aspiration pneumonia     PROCEDURE:  [x] LIMITED ECHO  [x] LIMITED CHEST  [ ] LIMITED RETROPERITONEAL  [ ] LIMITED ABDOMINAL  [ ] LIMITED DVT  [ ] NEEDLE GUIDANCE VASCULAR  [ ] NEEDLE GUIDANCE THORACENTESIS  [ ] NEEDLE GUIDANCE PARACENTESIS  [ ] NEEDLE GUIDANCE PERICARDIOCENTESIS  [ ] OTHER    FINDINGS:  -scattered basilar B lines on L   -no consolidation b/l   -poor visualization of cardiac windows but grossly normal LVSF       INTERPRETATION:    Overall normal lung and cardiac exam.     Images uploaded on [employer]    Agree with above. Scan done in my presence.

## 2020-02-21 LAB
ANION GAP SERPL CALC-SCNC: 15 MMO/L — HIGH (ref 7–14)
ANION GAP SERPL CALC-SCNC: 15 MMO/L — HIGH (ref 7–14)
ANISOCYTOSIS BLD QL: SLIGHT — SIGNIFICANT CHANGE UP
BASOPHILS # BLD AUTO: 0.04 K/UL — SIGNIFICANT CHANGE UP (ref 0–0.2)
BASOPHILS NFR BLD AUTO: 0.2 % — SIGNIFICANT CHANGE UP (ref 0–2)
BASOPHILS NFR SPEC: 0 % — SIGNIFICANT CHANGE UP (ref 0–2)
BLASTS # FLD: 0 % — SIGNIFICANT CHANGE UP (ref 0–0)
BUN SERPL-MCNC: 12 MG/DL — SIGNIFICANT CHANGE UP (ref 7–23)
BUN SERPL-MCNC: 14 MG/DL — SIGNIFICANT CHANGE UP (ref 7–23)
C-ANCA SER-ACNC: NEGATIVE — SIGNIFICANT CHANGE UP
CALCIUM SERPL-MCNC: 9.4 MG/DL — SIGNIFICANT CHANGE UP (ref 8.4–10.5)
CALCIUM SERPL-MCNC: 9.5 MG/DL — SIGNIFICANT CHANGE UP (ref 8.4–10.5)
CHLORIDE SERPL-SCNC: 113 MMOL/L — HIGH (ref 98–107)
CHLORIDE SERPL-SCNC: 113 MMOL/L — HIGH (ref 98–107)
CO2 SERPL-SCNC: 19 MMOL/L — LOW (ref 22–31)
CO2 SERPL-SCNC: 20 MMOL/L — LOW (ref 22–31)
CREAT SERPL-MCNC: 0.93 MG/DL — SIGNIFICANT CHANGE UP (ref 0.5–1.3)
CREAT SERPL-MCNC: 1.07 MG/DL — SIGNIFICANT CHANGE UP (ref 0.5–1.3)
EOSINOPHIL # BLD AUTO: 0.04 K/UL — SIGNIFICANT CHANGE UP (ref 0–0.5)
EOSINOPHIL NFR BLD AUTO: 0.2 % — SIGNIFICANT CHANGE UP (ref 0–6)
EOSINOPHIL NFR FLD: 0 % — SIGNIFICANT CHANGE UP (ref 0–6)
GIANT PLATELETS BLD QL SMEAR: PRESENT — SIGNIFICANT CHANGE UP
GLUCOSE SERPL-MCNC: 131 MG/DL — HIGH (ref 70–99)
GLUCOSE SERPL-MCNC: 138 MG/DL — HIGH (ref 70–99)
HCT VFR BLD CALC: 33 % — LOW (ref 39–50)
HGB BLD-MCNC: 11 G/DL — LOW (ref 13–17)
IMM GRANULOCYTES NFR BLD AUTO: 0.9 % — SIGNIFICANT CHANGE UP (ref 0–1.5)
LYMPHOCYTES # BLD AUTO: 0.84 K/UL — LOW (ref 1–3.3)
LYMPHOCYTES # BLD AUTO: 4.5 % — LOW (ref 13–44)
LYMPHOCYTES NFR SPEC AUTO: 5.3 % — LOW (ref 13–44)
MACROCYTES BLD QL: SLIGHT — SIGNIFICANT CHANGE UP
MAGNESIUM SERPL-MCNC: 1.8 MG/DL — SIGNIFICANT CHANGE UP (ref 1.6–2.6)
MAGNESIUM SERPL-MCNC: 1.8 MG/DL — SIGNIFICANT CHANGE UP (ref 1.6–2.6)
MCHC RBC-ENTMCNC: 30.2 PG — SIGNIFICANT CHANGE UP (ref 27–34)
MCHC RBC-ENTMCNC: 33.3 % — SIGNIFICANT CHANGE UP (ref 32–36)
MCV RBC AUTO: 90.7 FL — SIGNIFICANT CHANGE UP (ref 80–100)
METAMYELOCYTES # FLD: 0 % — SIGNIFICANT CHANGE UP (ref 0–1)
MONOCYTES # BLD AUTO: 0.79 K/UL — SIGNIFICANT CHANGE UP (ref 0–0.9)
MONOCYTES NFR BLD AUTO: 4.3 % — SIGNIFICANT CHANGE UP (ref 2–14)
MONOCYTES NFR BLD: 1.7 % — LOW (ref 2–9)
MYELOCYTES NFR BLD: 0 % — SIGNIFICANT CHANGE UP (ref 0–0)
NEUTROPHIL AB SER-ACNC: 86 % — HIGH (ref 43–77)
NEUTROPHILS # BLD AUTO: 16.66 K/UL — HIGH (ref 1.8–7.4)
NEUTROPHILS NFR BLD AUTO: 89.9 % — HIGH (ref 43–77)
NEUTS BAND # BLD: 6.1 % — HIGH (ref 0–6)
NRBC # FLD: 0 K/UL — SIGNIFICANT CHANGE UP (ref 0–0)
OTHER - HEMATOLOGY %: 0 — SIGNIFICANT CHANGE UP
OVALOCYTES BLD QL SMEAR: SLIGHT — SIGNIFICANT CHANGE UP
P-ANCA SER-ACNC: NEGATIVE — SIGNIFICANT CHANGE UP
PHOSPHATE SERPL-MCNC: 2.3 MG/DL — LOW (ref 2.5–4.5)
PHOSPHATE SERPL-MCNC: 3.3 MG/DL — SIGNIFICANT CHANGE UP (ref 2.5–4.5)
PLATELET # BLD AUTO: 189 K/UL — SIGNIFICANT CHANGE UP (ref 150–400)
PLATELET COUNT - ESTIMATE: NORMAL — SIGNIFICANT CHANGE UP
PMV BLD: 10.6 FL — SIGNIFICANT CHANGE UP (ref 7–13)
POIKILOCYTOSIS BLD QL AUTO: SLIGHT — SIGNIFICANT CHANGE UP
POLYCHROMASIA BLD QL SMEAR: SLIGHT — SIGNIFICANT CHANGE UP
POTASSIUM SERPL-MCNC: 3.7 MMOL/L — SIGNIFICANT CHANGE UP (ref 3.5–5.3)
POTASSIUM SERPL-MCNC: 3.8 MMOL/L — SIGNIFICANT CHANGE UP (ref 3.5–5.3)
POTASSIUM SERPL-SCNC: 3.7 MMOL/L — SIGNIFICANT CHANGE UP (ref 3.5–5.3)
POTASSIUM SERPL-SCNC: 3.8 MMOL/L — SIGNIFICANT CHANGE UP (ref 3.5–5.3)
PROMYELOCYTES # FLD: 0 % — SIGNIFICANT CHANGE UP (ref 0–0)
PROT S FREE PPP-ACNC: 64.6 % — LOW (ref 70–130)
RBC # BLD: 3.64 M/UL — LOW (ref 4.2–5.8)
RBC # FLD: 13.5 % — SIGNIFICANT CHANGE UP (ref 10.3–14.5)
SODIUM SERPL-SCNC: 147 MMOL/L — HIGH (ref 135–145)
SODIUM SERPL-SCNC: 148 MMOL/L — HIGH (ref 135–145)
VARIANT LYMPHS # BLD: 0.9 % — SIGNIFICANT CHANGE UP
WBC # BLD: 18.54 K/UL — HIGH (ref 3.8–10.5)
WBC # FLD AUTO: 18.54 K/UL — HIGH (ref 3.8–10.5)

## 2020-02-21 PROCEDURE — 95720 EEG PHY/QHP EA INCR W/VEEG: CPT

## 2020-02-21 PROCEDURE — 99291 CRITICAL CARE FIRST HOUR: CPT

## 2020-02-21 RX ORDER — LABETALOL HCL 100 MG
10 TABLET ORAL ONCE
Refills: 0 | Status: COMPLETED | OUTPATIENT
Start: 2020-02-21 | End: 2020-02-21

## 2020-02-21 RX ORDER — LABETALOL HCL 100 MG
200 TABLET ORAL EVERY 8 HOURS
Refills: 0 | Status: DISCONTINUED | OUTPATIENT
Start: 2020-02-21 | End: 2020-02-22

## 2020-02-21 RX ORDER — LABETALOL HCL 100 MG
200 TABLET ORAL ONCE
Refills: 0 | Status: COMPLETED | OUTPATIENT
Start: 2020-02-21 | End: 2020-02-21

## 2020-02-21 RX ORDER — LABETALOL HCL 100 MG
200 TABLET ORAL EVERY 12 HOURS
Refills: 0 | Status: DISCONTINUED | OUTPATIENT
Start: 2020-02-21 | End: 2020-02-21

## 2020-02-21 RX ORDER — SODIUM,POTASSIUM PHOSPHATES 278-250MG
1 POWDER IN PACKET (EA) ORAL ONCE
Refills: 0 | Status: COMPLETED | OUTPATIENT
Start: 2020-02-21 | End: 2020-02-21

## 2020-02-21 RX ORDER — PHENOBARBITAL 60 MG
130 TABLET ORAL ONCE
Refills: 0 | Status: DISCONTINUED | OUTPATIENT
Start: 2020-02-21 | End: 2020-02-21

## 2020-02-21 RX ORDER — MIDAZOLAM HYDROCHLORIDE 1 MG/ML
2 INJECTION, SOLUTION INTRAMUSCULAR; INTRAVENOUS ONCE
Refills: 0 | Status: DISCONTINUED | OUTPATIENT
Start: 2020-02-21 | End: 2020-02-21

## 2020-02-21 RX ORDER — POTASSIUM PHOSPHATE, MONOBASIC POTASSIUM PHOSPHATE, DIBASIC 236; 224 MG/ML; MG/ML
30 INJECTION, SOLUTION INTRAVENOUS ONCE
Refills: 0 | Status: COMPLETED | OUTPATIENT
Start: 2020-02-21 | End: 2020-02-21

## 2020-02-21 RX ORDER — ATORVASTATIN CALCIUM 80 MG/1
10 TABLET, FILM COATED ORAL AT BEDTIME
Refills: 0 | Status: DISCONTINUED | OUTPATIENT
Start: 2020-02-21 | End: 2020-02-25

## 2020-02-21 RX ORDER — AMLODIPINE BESYLATE 2.5 MG/1
10 TABLET ORAL DAILY
Refills: 0 | Status: DISCONTINUED | OUTPATIENT
Start: 2020-02-21 | End: 2020-02-25

## 2020-02-21 RX ORDER — ACETAMINOPHEN 500 MG
650 TABLET ORAL EVERY 6 HOURS
Refills: 0 | Status: DISCONTINUED | OUTPATIENT
Start: 2020-02-21 | End: 2020-02-23

## 2020-02-21 RX ORDER — PHENOBARBITAL 60 MG
130 TABLET ORAL
Refills: 0 | Status: DISCONTINUED | OUTPATIENT
Start: 2020-02-21 | End: 2020-02-22

## 2020-02-21 RX ORDER — PHENOBARBITAL 60 MG
65 TABLET ORAL ONCE
Refills: 0 | Status: DISCONTINUED | OUTPATIENT
Start: 2020-02-21 | End: 2020-02-21

## 2020-02-21 RX ORDER — FAMOTIDINE 10 MG/ML
20 INJECTION INTRAVENOUS DAILY
Refills: 0 | Status: DISCONTINUED | OUTPATIENT
Start: 2020-02-21 | End: 2020-02-25

## 2020-02-21 RX ADMIN — PIPERACILLIN AND TAZOBACTAM 25 GRAM(S): 4; .5 INJECTION, POWDER, LYOPHILIZED, FOR SOLUTION INTRAVENOUS at 12:00

## 2020-02-21 RX ADMIN — Medication 2 MILLIGRAM(S): at 16:00

## 2020-02-21 RX ADMIN — SODIUM CHLORIDE 30 MILLILITER(S): 5 INJECTION, SOLUTION INTRAVENOUS at 08:26

## 2020-02-21 RX ADMIN — Medication 1 PACKET(S): at 00:43

## 2020-02-21 RX ADMIN — Medication 250 MILLIGRAM(S): at 17:00

## 2020-02-21 RX ADMIN — Medication 200 MILLIGRAM(S): at 15:57

## 2020-02-21 RX ADMIN — POTASSIUM PHOSPHATE, MONOBASIC POTASSIUM PHOSPHATE, DIBASIC 83.33 MILLIMOLE(S): 236; 224 INJECTION, SOLUTION INTRAVENOUS at 05:24

## 2020-02-21 RX ADMIN — FAMOTIDINE 20 MILLIGRAM(S): 10 INJECTION INTRAVENOUS at 15:56

## 2020-02-21 RX ADMIN — Medication 200 MILLIGRAM(S): at 22:01

## 2020-02-21 RX ADMIN — Medication 130 MILLIGRAM(S): at 14:23

## 2020-02-21 RX ADMIN — MIDAZOLAM HYDROCHLORIDE 2 MILLIGRAM(S): 1 INJECTION, SOLUTION INTRAMUSCULAR; INTRAVENOUS at 02:30

## 2020-02-21 RX ADMIN — LEVETIRACETAM 400 MILLIGRAM(S): 250 TABLET, FILM COATED ORAL at 17:00

## 2020-02-21 RX ADMIN — AMLODIPINE BESYLATE 10 MILLIGRAM(S): 2.5 TABLET ORAL at 15:56

## 2020-02-21 RX ADMIN — DEXMEDETOMIDINE HYDROCHLORIDE IN 0.9% SODIUM CHLORIDE 3.37 MICROGRAM(S)/KG/HR: 4 INJECTION INTRAVENOUS at 08:25

## 2020-02-21 RX ADMIN — Medication 65 MILLIGRAM(S): at 09:01

## 2020-02-21 RX ADMIN — Medication 650 MILLIGRAM(S): at 20:30

## 2020-02-21 RX ADMIN — Medication 250 MILLIGRAM(S): at 06:06

## 2020-02-21 RX ADMIN — CHLORHEXIDINE GLUCONATE 1 APPLICATION(S): 213 SOLUTION TOPICAL at 11:59

## 2020-02-21 RX ADMIN — Medication 650 MILLIGRAM(S): at 20:25

## 2020-02-21 RX ADMIN — PIPERACILLIN AND TAZOBACTAM 25 GRAM(S): 4; .5 INJECTION, POWDER, LYOPHILIZED, FOR SOLUTION INTRAVENOUS at 04:25

## 2020-02-21 RX ADMIN — DEXMEDETOMIDINE HYDROCHLORIDE IN 0.9% SODIUM CHLORIDE 3.37 MICROGRAM(S)/KG/HR: 4 INJECTION INTRAVENOUS at 19:32

## 2020-02-21 RX ADMIN — Medication 130 MILLIGRAM(S): at 02:35

## 2020-02-21 RX ADMIN — Medication 10 MILLIGRAM(S): at 16:07

## 2020-02-21 RX ADMIN — ATORVASTATIN CALCIUM 10 MILLIGRAM(S): 80 TABLET, FILM COATED ORAL at 22:01

## 2020-02-21 RX ADMIN — LEVETIRACETAM 400 MILLIGRAM(S): 250 TABLET, FILM COATED ORAL at 05:24

## 2020-02-21 RX ADMIN — Medication 2 MILLIGRAM(S): at 02:20

## 2020-02-21 RX ADMIN — Medication 1 PACKET(S): at 05:24

## 2020-02-21 RX ADMIN — PIPERACILLIN AND TAZOBACTAM 25 GRAM(S): 4; .5 INJECTION, POWDER, LYOPHILIZED, FOR SOLUTION INTRAVENOUS at 19:32

## 2020-02-21 RX ADMIN — Medication 40 MILLIEQUIVALENT(S): at 00:44

## 2020-02-21 NOTE — DIETITIAN INITIAL EVALUATION ADULT. - OTHER INFO
40 y/o M w/ Mhx HTN, T2DM, prior ETOH and cocaine abuse with history of recent R basal ganglia hemorrhagic stroke (with residual LUE and LLE weakness), L thalamic ischemic stroke, and R parietal density transferred from  where he presented with AMS/obtundation preceded by dizziness, HA, and worsening left sided weakness found to have ICH, intubated for airway protection w/ hospital course c/b LLE shaking presumed to be seizure requiring keppra and ativan. Patient was transferred to [facility] for neuro eval and EEG, now extubated. CT showing stable hemorrhage, currently undergoing workup for hemorrhagic stroke etiology and being managed for possible EtOH withdrawal given persistent tachycardia/HTN despite max doses of nicardipine.  O/n, EEG demonstrated frequent sharp waves coming from R parietal lobe so keppra dose was increased. Later on, he was noted to have episodes of leftward gaze followed by unresponsiveness and LLE shaking w/o EEG correlate. He continued to be tachy/hypertensive, so he was treated w/ 2 doses of ativan and phenobarb for possible alcohol withdrawal given his EtOH abuse history and continued tachy/HTN despite maxing out on nicardipine drip. HR and BP improved following ativan/phenobarb and he was able to be taken off nicardipine. He was febrile o/n again as well.  Pt. visited , met w/ pt.'s father and per father pt. was on PO diet prior to admission, no known food allergies, no issues w/ taking PO , no recent wt. changes , was on solid food . Pt. s/p bed side swallow eval and recommended alternate means of nutrition support .

## 2020-02-21 NOTE — SWALLOW BEDSIDE ASSESSMENT ADULT - SWALLOW EVAL: RECOMMENDED DIET
PO is contraindicated at this time, continue to use NGT to meet nutrition/hydration/medication needs

## 2020-02-21 NOTE — EEG REPORT - NS EEG TEXT BOX
[facility] EPILEPSY CENTER   REPORT OF CONTINUOUS VIDEO EEG     [facility]: [address], [place] [address]  [facility]: [id number] [address]  [facility]: [address] [place] [address]    Patient Name: LUIS EDUARDO FRANKEL  Age and : 40y (82)  MRN #: [id number]  Location: [place] [address]  Referring Physician: Cristiane Ernst    Start Time/Date: 08:00 on 20  End Time/Date: 08:00 on 20  Duration: 24hrs    _____________________________________________________________  STUDY INFORMATION    EEG Recording Technique:  The patient underwent continuous Video-EEG monitoring, using Telemetry System hardware on the XLTek Digital System. EEG and video data were stored on a computer hard drive with important events saved in digital archive files. The material was reviewed by a physician (electroencephalographer / epileptologist) on a daily basis. Christopher and seizure detection algorithms were utilized and reviewed. An EEG Technician attended to the patient, and was available throughout daytime work hours.  The epilepsy center neurologist was available in person or on call 24-hours per day.    EEG Placement and Labeling of Electrodes:  The EEG was performed utilizing 20 channel referential EEG connections (coronal over temporal over parasagittal montage) using all standard 10-20 electrode placements with EKG, with additional electrodes placed in the inferior temporal region using the modified 10-10 montage electrode placements for elective admissions, or if deemed necessary. Recording was at a sampling rate of 256 samples per second per channel. Time synchronized digital video recording was done simultaneously with EEG recording. A low light infrared camera was used for low light recording.     _____________________________________________________________  HISTORY    Patient is a 40y old  Male who presents with a chief complaint of AMS.    PERTINENT MEDICATION:  levETIRAcetam  IVPB 750 milliGRAM(s) IV Intermittent every 12 hours  _____________________________________________________________  STUDY INTERPRETATION    Findings: The background was continuous, spontaneously variable and reactive. During wakefulness, the posterior dominant rhythm consisted of asymmetric, poorly-modulated 8-9 Hz activity, with amplitude to 30 uV, that was seen over the left hemisphere.     Background Slowing:  Excess diffuse polymorphic delta slowing.     Focal Slowing:   Continuous polymorphic delta slowing in the right hemisphere.     Sleep Background:  Drowsiness was characterized by fragmentation, attenuation, and slowing of the background activity.    Sleep was characterized by the presence of asymmetric, rudimentary spindles and K-complexes seen over the left hemisphere only.    Other Non-Epileptiform Findings:  Attenuation of fast activity in the right hemisphere.  Breach effect in right hemisphere, max parietally, characterized by higher amplitude and sharply contoured waves.     Interictal Epileptiform Activity:   Occasional right parietal (P4) sharp wave discharges.     Events:  Clinical events: 5 push button events at 12:51, 18:38, 18:48, 20:43, 20:52 and 01:39 for what appears to be right arm spontaneous movements, turning torso and tensing muscles without ictal correlate on EEG.     Activation Procedures:   Hyperventilation was not performed.    Photic stimulation was performed and did not elicit any abnormality.     Artifacts:  Intermittent myogenic and movement artifacts were noted.    ECG:  The heart rate on single channel ECG was predominantly between 60-70 BPM.    _____________________________________________________________  EEG SUMMARY/CLASSIFICATION    Abnormal EEG in an altered patient.  - Occasional right parietal (P4) sharp wave discharges.  - Continuous polymorphic delta slowing in the right hemisphere.   - Attenuation of fast activity in the right hemisphere.  - Mild generalized slowing.  - Breach effect in right hemisphere, max parietally, characterized by higher amplitude and sharply contoured waves.   - 5 push button events at 12:51, 18:38, 18:48, 20:43, 20:52 and 01:39 for what appears to be right arm spontaneous movements, turning torso and tensing muscles without ictal correlate on EEG.   _____________________________________________________________  EEG IMPRESSION/CLINICAL CORRELATE    Abnormal EEG study.  1. Potential epileptogenic focus in the right parietal region.   2. Cortical dysfunction and structural abnormality in the right hemisphere.  3. Mild nonspecific diffuse or multifocal cerebral dysfunction.   4. No seizure seen.  5. 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     _____________________________________________________________    Martinez Colunga MD  Attending Physician, [facility] Epilepsy [place] [facility] EPILEPSY CENTER   REPORT OF CONTINUOUS VIDEO EEG     [facility]: [address], [place] [address]  [facility]: [address], [place] [address]  [facility]: [address] [place] [address]    Patient Name: LUIS EDUARDO FRANKEL  Age and : 40y (82)  MRN #: [id number]  Location: [place] [address]  Referring Physician: Cristiane Ernst    Start Time/Date: 08:00 on 20  End Time/Date: 08:00 on 20  Duration: 24hrs    _____________________________________________________________  STUDY INFORMATION    EEG Recording Technique:  The patient underwent continuous Video-EEG monitoring, using Telemetry System hardware on the XLTek Digital System. EEG and video data were stored on a computer hard drive with important events saved in digital archive files. The material was reviewed by a physician (electroencephalographer / epileptologist) on a daily basis. Christopher and seizure detection algorithms were utilized and reviewed. An EEG Technician attended to the patient, and was available throughout daytime work hours.  The epilepsy center neurologist was available in person or on call 24-hours per day.    EEG Placement and Labeling of Electrodes:  The EEG was performed utilizing 20 channel referential EEG connections (coronal over temporal over parasagittal montage) using all standard 10-20 electrode placements with EKG, with additional electrodes placed in the inferior temporal region using the modified 10-10 montage electrode placements for elective admissions, or if deemed necessary. Recording was at a sampling rate of 256 samples per second per channel. Time synchronized digital video recording was done simultaneously with EEG recording. A low light infrared camera was used for low light recording.     _____________________________________________________________  HISTORY    Patient is a 40y old  Male who presents with a chief complaint of AMS.    PERTINENT MEDICATION:  levETIRAcetam  IVPB 750 milliGRAM(s) IV Intermittent every 12 hours  _____________________________________________________________  STUDY INTERPRETATION    Findings: The background was continuous, spontaneously variable and reactive. During wakefulness, the posterior dominant rhythm consisted of asymmetric, poorly-modulated 8 Hz activity, with amplitude to 30 uV, that was seen over the left hemisphere.     Background Slowing:  Diffuse delta with superimposed faster activities.    Focal Slowing:   Continuous polymorphic delta slowing in the right hemisphere.     Sleep Background:  Drowsiness was characterized by fragmentation, attenuation, and slowing of the background activity.    Sleep was characterized by the presence of asymmetric, rudimentary spindles and K-complexes seen over the left hemisphere only.    Other Non-Epileptiform Findings:  Attenuation of fast activity in the right hemisphere.  Breach effect in right hemisphere, max parietally, characterized by higher amplitude and sharply contoured waves.     Interictal Epileptiform Activity:   Occasional right parietal (P4) sharp wave discharges.     Events:  Clinical events: 6 push button events at 12:51, 18:38, 18:48, 20:43, 20:52 and 01:39 for what appears to be extension of right >left arms, turning of torso, jerking of LLE and diffuse tensing of muscles without ictal correlate on EEG.     Activation Procedures:   Hyperventilation was not performed.    Photic stimulation was performed and did not elicit any abnormality.     Artifacts:  Intermittent myogenic and movement artifacts were noted.    ECG:  The heart rate on single channel ECG was predominantly between 60-70 BPM.    _____________________________________________________________  EEG SUMMARY/CLASSIFICATION    Abnormal EEG in an altered patient.  - Occasional right parietal (P4) sharp wave discharges.  - Continuous polymorphic delta slowing in the right hemisphere.   - Attenuation of fast activity in the right hemisphere.  - Mild to moderate generalized slowing.  - Breach effect in right hemisphere, max parietally, characterized by higher amplitude and sharply contoured waves.     _____________________________________________________________  EEG IMPRESSION/CLINICAL CORRELATE    Abnormal EEG study.  1. Six push-button events for arm extension, left leg jerking and tensing of muscles. None of them with ictal correlate on EEG.  2. Potential epileptogenic focus in the right parietal region.   3. Cortical dysfunction and structural abnormality in the right hemisphere.  4. Mild to moderate nonspecific diffuse or multifocal cerebral dysfunction.   5. No seizure seen.  6. 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     _____________________________________________________________    Kenyatta Batres DO  Epilepsy Fellow    Martinez Colunga MD  Attending Physician, [facility] Epilepsy [place]

## 2020-02-21 NOTE — PROGRESS NOTE ADULT - ASSESSMENT
38 y/o M w/ Mhx HTN, T2DM, prior ETOH and cocaine abuse with history of recent R basal ganglia hemorrhagic stroke (with residual LUE and LLE weakness), L thalamic ischemic stroke, and R parietal density transferred from  where he presented with AMS/obtundation preceded by dizziness, HA, and worsening left sided weakness found to have ICH, intubated for airway protection w/ hospital course c/b LLE shaking presumed to be seizure requiring keppra and ativan. Patient was transferred to [facility] for neuro eval and EEG, now extubated and off sedation. CT showing stable hemorrhage, currently undergoing workup for hemorrhagic stroke etiology.    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  - MRI brain w, w/o when able  - TTE showed increased LV wall thickness, bubble study was unsuccessful due to poor visualization  - f/u LE duplex  - f/u hypercoagulable work up   - f/u neuro and neurosurgery recs     CARDIAC: h/o HTN  - c/w cardine drip goal SBP < 160  - has been having tachy/HTN, managing with anti-hypertensives as needed    RESPIRATORY  -weaned off sedation and extubated  -starting vanc/zosyn for possible aspiration PNA    ID  -febrile o/n with new leukocytosis  -starting vanc/zosyn for possible aspiration PNA  -fu procalcitonin    GI  -NPO for now    ENDO  -no acute issues  - ISS; A1C 4.9    RENAL  -hypertonic saline 3% started at 30 cc/hr to keep goal Na 145-150  -BMP q4h    HEME/ONC:  -DVT PPX- SCDs 38 y/o M w/ Mhx HTN, T2DM, prior ETOH and cocaine abuse with history of recent R basal ganglia hemorrhagic stroke (with residual LUE and LLE weakness), L thalamic ischemic stroke, and R parietal density transferred from  where he presented with AMS/obtundation preceded by dizziness, HA, and worsening left sided weakness found to have ICH, intubated for airway protection w/ hospital course c/b LLE shaking presumed to be seizure requiring keppra and ativan. Patient was transferred to [facility] for neuro eval and EEG, now extubated. CT showing stable hemorrhage, currently undergoing workup for hemorrhagic stroke etiology and being managed for possible EtOH withdrawal given persistent tachycardia/HTN despite max doses of nicardipine.    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 with vasogenic edema  - 24h head CT showed stable hemorrhage  - elevated HOB 30 degrees   - keppra increased to 750mg bid per neuro recs  - Goal SBP <160  - currently holding nicardipine, coming down on precedex  - c/w labetalol 200 BID  - q1h neuro checks  - c/w 3% saline to keep Na 145-150  - c/w video EEG  - no seizure seen on EEG, but noted to have potential epileptogenic focus in the right parietal region, cortical dysfunction and structural abnormality in the right hemisphere, and mild nonspecific diffuse or multifocal cerebral dysfunction.  - MRI brain w, w/o when able  - TTE showed increased LV wall thickness, bubble study was unsuccessful due to poor visualization  - LE duplex negative for DVTs  - f/u hypercoagulable work up - results so far show decreased protein S activity, low factor II assay, high factor VIII assay  - f/u neuro and neurosurgery recs     CARDIAC: h/o HTN  - goal SBP < 160  - holding cardine drip, coming down on precedex, c/w labetalol 200 BID   - has been having tachy/HTN, managing with anti-hypertensives as needed  - c/w phenobarb PRN for agitation/possible EtOH withdrawal, as patient gets very tachycardic/hypertensive when he is agitated    RESPIRATORY  -on 2L NC  -c/w vanc/zosyn for possible aspiration PNA    ID  -febrile o/n again with increasing leukocytosis  -elevated procal  -c/w vanc/zosyn for possible aspiration PNA  -fu BCx  -fu resp culture/gram stain    GI  -NPO for now    ENDO  -no acute issues  - ISS; A1C 4.9    RENAL  -c/w hypertonic saline 3% at 30 cc/hr to keep goal Na 145-150  -BMP q4h    HEME/ONC:  -DVT PPX- SCDs 38 y/o M w/ Mhx HTN, T2DM, prior ETOH and cocaine abuse with history of recent R basal ganglia hemorrhagic stroke (with residual LUE and LLE weakness), L thalamic ischemic stroke, and R parietal density transferred from  where he presented with AMS/obtundation preceded by dizziness, HA, and worsening left sided weakness found to have ICH, intubated for airway protection w/ hospital course c/b LLE shaking presumed to be seizure requiring keppra and ativan. Patient was transferred to [facility] for neuro eval and EEG, now extubated. CT showing stable hemorrhage, currently undergoing workup for hemorrhagic stroke etiology and being managed for possible EtOH withdrawal given persistent tachycardia/HTN despite max doses of nicardipine.    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 with vasogenic edema  - 24h head CT showed stable hemorrhage  - elevated HOB 30 degrees   - keppra increased to 750mg bid per neuro recs  - Goal SBP <160  - currently holding nicardipine, coming down on precedex  - c/w labetalol 200 BID  - q1h neuro checks  - 3% saline d/c given 72 hours s/p stroke  - c/w video EEG  - no seizure seen on EEG, but noted to have potential epileptogenic focus in the right parietal region, cortical dysfunction and structural abnormality in the right hemisphere, and mild nonspecific diffuse or multifocal cerebral dysfunction  - MRI brain w, w/o when able  - TTE showed increased LV wall thickness, bubble study was unsuccessful due to poor visualization  - LE duplex negative for DVTs  - f/u hypercoagulable work up - results so far show decreased protein S activity, low factor II assay, high factor VIII assay  - f/u neuro and neurosurgery recs     CARDIAC: h/o HTN  - goal SBP < 160  - holding cardine drip, coming down on precedex, c/w labetalol 200 TID   - has been having tachy/HTN, managing with anti-hypertensives as needed  -Patient w/ htn/tachycardia initially thought due to alcohol withdrawal, refractory to phenobarb today. Will use ativan PRN to tx for cocaine withdrawal    RESPIRATORY  -on 2L NC  -c/w vanc/zosyn 2/20 Day 2 for possible aspiration PNA    ID  -febrile o/n again with increasing leukocytosis  -elevated procal  -c/w vanc/zosyn for possible aspiration PNA  -fu BCx  -fu resp culture/gram stain    GI  -NPO for now    ENDO  -no acute issues  - ISS; A1C 4.9    RENAL  -d/c hypertonic saline given that it is 72 hrs s/p stroke/24 hrs s/p last stable ct scan    HEME/ONC:  -DVT PPX- SCDs

## 2020-02-21 NOTE — DIETITIAN INITIAL EVALUATION ADULT. - PERTINENT MEDS FT
Precedex, Labetalol, Keppra, Zosyn , Vancomycin, Norvasc, Propofol, Phenobarbital , Nicardipine , Pepcid

## 2020-02-21 NOTE — PROGRESS NOTE ADULT - ATTENDING COMMENTS
Agree with above.  Patient seen and examined. Chart reviewed.    40 year old man with thalamic bleed, hypertension, seizure, respiratory failure     - AED for potential seizures, abnormal EEG  - on ABx patient with intermittent fevers  - BP control with oral medications  - d/c 3% saline  - episodes of tachycardia and hypertension ? withdrawal improves with ativan  - doing well on nasal cannula  - unable to swallow

## 2020-02-21 NOTE — PROGRESS NOTE ADULT - SUBJECTIVE AND OBJECTIVE BOX
CHIEF COMPLAINT: Patient is a 40y old  Male who presents with a chief complaint of   Interval Events:    REVIEW OF SYSTEMS:  Constitutional:   Eyes:  ENT:  CV:  Resp:  GI:  :  MSK:  Integumentary:  Neurological:  Psychiatric:  Endocrine:  Hematologic/Lymphatic:  Allergic/Immunologic:  [ ] All other systems negative  [ ] Unable to assess ROS because ________    OBJECTIVE:  ICU Vital Signs Last 24 Hrs  T(C): 37.1 (2020 04:00), Max: 38.7 (2020 20:00)  T(F): 98.7 (2020 04:00), Max: 101.6 (2020 20:00)  HR: 104 (2020 06:00) (81 - 179)  BP: 128/91 (2020 06:00) (95/62 - 186/115)  BP(mean): 103 (2020 06:00) (73 - 133)  ABP: --  ABP(mean): --  RR: 20 (2020 06:00) (12 - 32)  SpO2: 100% (2020 06:00) (92% - 100%)    Mode: CPAP with PS, FiO2: 30, PEEP: 5, PS: 5, MAP: 7    02-20 @ 07:01  -  02- @ 07:00  --------------------------------------------------------  IN: 2494.4 mL / OUT: 2350 mL / NET: 144.4 mL      CAPILLARY BLOOD GLUCOSE          PHYSICAL EXAM:  General:   HEENT:   Lymph Nodes:  Neck:   Respiratory:   Cardiovascular:   Abdomen:   Extremities:   Skin:   Neurological:  Psychiatry:    HOSPITAL MEDICATIONS:  MEDICATIONS  (STANDING):  chlorhexidine 4% Liquid 1 Application(s) Topical <User Schedule>  dexMEDEtomidine Infusion 0.2 MICROgram(s)/kG/Hr (3.37 mL/Hr) IV Continuous <Continuous>  levETIRAcetam  IVPB 750 milliGRAM(s) IV Intermittent every 12 hours  niCARdipine Infusion 5 mG/Hr (25 mL/Hr) IV Continuous <Continuous>  piperacillin/tazobactam IVPB.. 3.375 Gram(s) IV Intermittent every 8 hours  sodium chloride 3%. 500 milliLiter(s) (30 mL/Hr) IV Continuous <Continuous>  vancomycin  IVPB 1000 milliGRAM(s) IV Intermittent every 12 hours    MEDICATIONS  (PRN):      LABS:  ( @ 03:40)                        11.0  18.54 )-----------( 189                 33.0    Neutrophils = 16.66 (89.9%)  Lymphocytes = 0.84 (4.5%)  Eosinophils = 0.04 (0.2%)  Basophils = 0.04 (0.2%)  Monocytes = 0.79 (4.3%)  Bands = 6.1%    WBC Trend: 18.54<--, 12.24<--, 8.22<--  Hb Trend: 11.0<--, 10.8<--, 14.9<--, 12.7<--  Plt Trend: 189<--, 176<--, 264<--, 244<--      147<H>  |  113<H>  |  14  ----------------------------<  138<H>  3.7   |  19<L>  |  1.07    Ca    9.5      2020 03:40  Phos  2.3       Mg     1.8         TPro  5.7<L>  /  Alb  3.0<L>  /  TBili  0.5  /  DBili  x   /  AST  9   /  ALT  12  /  AlkPhos  81      Creatinine Trend: 1.07<--, 1.01<--, 1.02<--, 0.92<--, 0.81<--, 0.98<--    Urinalysis Basic - ( 2020 13:15 )    Color: LIGHT YELLOWA / Appearance: CLEAR / S.023 / pH: 6.5  Gluc: NEGATIVE / Ketone: NEGATIVE  / Bili: NEGATIVE / Urobili: NORMAL   Blood: NEGATIVE / Protein: 50 / Nitrite: NEGATIVE   Leuk Esterase: NEGATIVE / RBC: 0-2 / WBC 0-2   Sq Epi: OCC / Non Sq Epi: x / Bacteria: NEGATIVE                MICROBIOLOGY:   Blood Cx:  Urine Cx:  Sputum Cx:  Legionella:  RVP:    RADIOLOGY:  X Ray:  CT:  MRI:  Ultrasound:  [ ] Reviewed and interpreted by me    EKG: CHIEF COMPLAINT: Patient is a 40y old  Male who presents with a chief complaint of L thalamic IPH  Interval Events: O/n, EEG demonstrated frequent sharp waves coming from R parietal lobe so keppra dose was increased. Later on, he was noted to have episodes of leftward gaze followed by unresponsiveness and LLE shaking w/o EEG correlate. He continued to be tachy/hypertensive, so he was treated w/ 2 doses of ativan and phenobarb for possible alcohol withdrawal given his EtOH abuse history and continued tachy/HTN despite maxing out on nicardipine drip. HR and BP improved following ativan/phenobarb and he was able to be taken off nicardipine. He was febrile o/n again as well.    REVIEW OF SYSTEMS:  Constitutional:   Eyes:  ENT:  CV:  Resp:  GI:  :  MSK:  Integumentary:  Neurological:  Psychiatric:  Endocrine:  Hematologic/Lymphatic:  Allergic/Immunologic:  [ ] All other systems negative  [x] Unable to assess ROS because somnolent following ativan/phenobarb    OBJECTIVE:  ICU Vital Signs Last 24 Hrs  T(C): 37.1 (2020 04:00), Max: 38.7 (2020 20:00)  T(F): 98.7 (2020 04:00), Max: 101.6 (2020 20:00)  HR: 104 (2020 06:00) (81 - 179)  BP: 128/91 (2020 06:00) (95/62 - 186/115)  BP(mean): 103 (2020 06:00) (73 - 133)  ABP: --  ABP(mean): --  RR: 20 (2020 06:00) (12 - 32)  SpO2: 100% (2020 06:00) (92% - 100%)    Mode: CPAP with PS, FiO2: 30, PEEP: 5, PS: 5, MAP: 7    - @ 07:01  -  - @ 07:00  --------------------------------------------------------  IN: 2494.4 mL / OUT: 2350 mL / NET: 144.4 mL      CAPILLARY BLOOD GLUCOSE          PHYSICAL EXAM:  General: asleep  HEENT:   Lymph Nodes:  Neck:   Respiratory:   Cardiovascular:   Abdomen:   Extremities:   Skin:   Neurological:  Psychiatry:    HOSPITAL MEDICATIONS:  MEDICATIONS  (STANDING):  chlorhexidine 4% Liquid 1 Application(s) Topical <User Schedule>  dexMEDEtomidine Infusion 0.2 MICROgram(s)/kG/Hr (3.37 mL/Hr) IV Continuous <Continuous>  levETIRAcetam  IVPB 750 milliGRAM(s) IV Intermittent every 12 hours  niCARdipine Infusion 5 mG/Hr (25 mL/Hr) IV Continuous <Continuous>  piperacillin/tazobactam IVPB.. 3.375 Gram(s) IV Intermittent every 8 hours  sodium chloride 3%. 500 milliLiter(s) (30 mL/Hr) IV Continuous <Continuous>  vancomycin  IVPB 1000 milliGRAM(s) IV Intermittent every 12 hours    MEDICATIONS  (PRN):      LABS:  ( @ 03:40)                        11.0  18.54 )-----------( 189                 33.0    Neutrophils = 16.66 (89.9%)  Lymphocytes = 0.84 (4.5%)  Eosinophils = 0.04 (0.2%)  Basophils = 0.04 (0.2%)  Monocytes = 0.79 (4.3%)  Bands = 6.1%    WBC Trend: 18.54<--, 12.24<--, 8.22<--  Hb Trend: 11.0<--, 10.8<--, 14.9<--, 12.7<--  Plt Trend: 189<--, 176<--, 264<--, 244<--      147<H>  |  113<H>  |  14  ----------------------------<  138<H>  3.7   |  19<L>  |  1.07    Ca    9.5      2020 03:40  Phos  2.3       Mg     1.8         TPro  5.7<L>  /  Alb  3.0<L>  /  TBili  0.5  /  DBili  x   /  AST  9   /  ALT  12  /  AlkPhos  81      Creatinine Trend: 1.07<--, 1.01<--, 1.02<--, 0.92<--, 0.81<--, 0.98<--    Urinalysis Basic - ( 2020 13:15 )    Color: LIGHT YELLOWA / Appearance: CLEAR / S.023 / pH: 6.5  Gluc: NEGATIVE / Ketone: NEGATIVE  / Bili: NEGATIVE / Urobili: NORMAL   Blood: NEGATIVE / Protein: 50 / Nitrite: NEGATIVE   Leuk Esterase: NEGATIVE / RBC: 0-2 / WBC 0-2   Sq Epi: OCC / Non Sq Epi: x / Bacteria: NEGATIVE                MICROBIOLOGY:   Blood Cx:  Urine Cx:  Sputum Cx:  Legionella:  RVP:    RADIOLOGY:  X Ray:  CT:  MRI:  Ultrasound:  [ ] Reviewed and interpreted by me    EKG: CHIEF COMPLAINT: Patient is a 40y old  Male who presents with a chief complaint of L thalamic IPH  Interval Events: O/n, EEG demonstrated frequent sharp waves coming from R parietal lobe so keppra dose was increased. Later on, he was noted to have episodes of leftward gaze followed by unresponsiveness and LLE shaking w/o EEG correlate. He continued to be tachy/hypertensive, so he was treated w/ 2 doses of ativan and phenobarb for possible alcohol withdrawal given his EtOH abuse history and continued tachy/HTN despite maxing out on nicardipine drip. HR and BP improved following ativan/phenobarb and he was able to be taken off nicardipine. He was febrile o/n again as well.    REVIEW OF SYSTEMS:  Constitutional:   Eyes:  ENT:  CV:  Resp:  GI:  :  MSK:  Integumentary:  Neurological:  Psychiatric:  Endocrine:  Hematologic/Lymphatic:  Allergic/Immunologic:  [ ] All other systems negative  [x] Unable to assess ROS because somnolent following ativan/phenobarb    OBJECTIVE:  ICU Vital Signs Last 24 Hrs  T(C): 37.1 (2020 04:00), Max: 38.7 (2020 20:00)  T(F): 98.7 (2020 04:00), Max: 101.6 (2020 20:00)  HR: 104 (2020 06:00) (81 - 179)  BP: 128/91 (2020 06:00) (95/62 - 186/115)  BP(mean): 103 (2020 06:00) (73 - 133)  ABP: --  ABP(mean): --  RR: 20 (2020 06:00) (12 - 32)  SpO2: 100% (2020 06:00) (92% - 100%)    Mode: CPAP with PS, FiO2: 30, PEEP: 5, PS: 5, MAP: 7    - @ 07:01  -  - @ 07:00  --------------------------------------------------------  IN: 2494.4 mL / OUT: 2350 mL / NET: 144.4 mL      CAPILLARY BLOOD GLUCOSE          PHYSICAL EXAM:  General: asleep, did not awaken during exam  HEENT: moist mucus membranes, NG tube in place  Respiratory: mild expiratory wheezing b/l  Cardiovascular: RRR, normal S1, S2, no m/g/r  Abdomen: soft, NT/ND, +BS  Extremities: 2+ radial/DP pulses, no LE edema  Skin: WWP  Neurological: somnolent    HOSPITAL MEDICATIONS:  MEDICATIONS  (STANDING):  chlorhexidine 4% Liquid 1 Application(s) Topical <User Schedule>  dexMEDEtomidine Infusion 0.2 MICROgram(s)/kG/Hr (3.37 mL/Hr) IV Continuous <Continuous>  levETIRAcetam  IVPB 750 milliGRAM(s) IV Intermittent every 12 hours  niCARdipine Infusion 5 mG/Hr (25 mL/Hr) IV Continuous <Continuous>  piperacillin/tazobactam IVPB.. 3.375 Gram(s) IV Intermittent every 8 hours  sodium chloride 3%. 500 milliLiter(s) (30 mL/Hr) IV Continuous <Continuous>  vancomycin  IVPB 1000 milliGRAM(s) IV Intermittent every 12 hours    MEDICATIONS  (PRN):      LABS:  ( @ 03:40)                        11.0  18.54 )-----------( 189                 33.0    Neutrophils = 16.66 (89.9%)  Lymphocytes = 0.84 (4.5%)  Eosinophils = 0.04 (0.2%)  Basophils = 0.04 (0.2%)  Monocytes = 0.79 (4.3%)  Bands = 6.1%    WBC Trend: 18.54<--, 12.24<--, 8.22<--  Hb Trend: 11.0<--, 10.8<--, 14.9<--, 12.7<--  Plt Trend: 189<--, 176<--, 264<--, 244<--      147<H>  |  113<H>  |  14  ----------------------------<  138<H>  3.7   |  19<L>  |  1.07    Ca    9.5      2020 03:40  Phos  2.3       Mg     1.8         TPro  5.7<L>  /  Alb  3.0<L>  /  TBili  0.5  /  DBili  x   /  AST  9   /  ALT  12  /  AlkPhos  81      Creatinine Trend: 1.07<--, 1.01<--, 1.02<--, 0.92<--, 0.81<--, 0.98<--    Urinalysis Basic - ( 2020 13:15 )    Color: LIGHT YELLOWA / Appearance: CLEAR / S.023 / pH: 6.5  Gluc: NEGATIVE / Ketone: NEGATIVE  / Bili: NEGATIVE / Urobili: NORMAL   Blood: NEGATIVE / Protein: 50 / Nitrite: NEGATIVE   Leuk Esterase: NEGATIVE / RBC: 0-2 / WBC 0-2   Sq Epi: OCC / Non Sq Epi: x / Bacteria: NEGATIVE                MICROBIOLOGY:   Blood Cx: collected, no results yet  Urine Cx:  Sputum Cx:  Legionella:  RVP:    RADIOLOGY:  X Ray:  CT:  MRI:  Ultrasound:  [x] Reviewed and interpreted by me    EKG: CHIEF COMPLAINT: Patient is a 40y old  Male who presents with a chief complaint of L thalamic IPH  Interval Events: O/n, EEG demonstrated frequent sharp waves coming from R parietal lobe so keppra dose was increased. Later on, he was noted to have episodes of leftward gaze followed by unresponsiveness and LLE shaking w/o EEG correlate. He continued to be tachy/hypertensive, so he was treated w/ 2 doses of ativan and phenobarb for possible alcohol withdrawal given his EtOH abuse history and continued tachy/HTN despite maxing out on nicardipine drip. HR and BP improved following ativan/phenobarb and he was able to be taken off nicardipine. He was febrile o/n again as well.     REVIEW OF SYSTEMS:  Constitutional:   Eyes:  ENT:  CV:  Resp:  GI:  :  MSK:  Integumentary:  Neurological:  Psychiatric:  Endocrine:  Hematologic/Lymphatic:  Allergic/Immunologic:  [ ] All other systems negative  [x] Unable to assess ROS because somnolent following ativan/phenobarb    OBJECTIVE:  ICU Vital Signs Last 24 Hrs  T(C): 37.1 (2020 04:00), Max: 38.7 (2020 20:00)  T(F): 98.7 (2020 04:00), Max: 101.6 (2020 20:00)  HR: 104 (2020 06:00) (81 - 179)  BP: 128/91 (2020 06:00) (95/62 - 186/115)  BP(mean): 103 (2020 06:00) (73 - 133)  ABP: --  ABP(mean): --  RR: 20 (2020 06:00) (12 - 32)  SpO2: 100% (2020 06:00) (92% - 100%)    Mode: CPAP with PS, FiO2: 30, PEEP: 5, PS: 5, MAP: 7    - @ 07:01  -  - @ 07:00  --------------------------------------------------------  IN: 2494.4 mL / OUT: 2350 mL / NET: 144.4 mL      CAPILLARY BLOOD GLUCOSE          PHYSICAL EXAM:  General: asleep, did not awaken during exam  HEENT: moist mucus membranes, NG tube in place  Respiratory: mild expiratory wheezing b/l  Cardiovascular: RRR, normal S1, S2, no m/g/r  Abdomen: soft, NT/ND, +BS  Extremities: 2+ radial/DP pulses, no LE edema  Skin: WWP  Neurological: somnolent    HOSPITAL MEDICATIONS:  MEDICATIONS  (STANDING):  chlorhexidine 4% Liquid 1 Application(s) Topical <User Schedule>  dexMEDEtomidine Infusion 0.2 MICROgram(s)/kG/Hr (3.37 mL/Hr) IV Continuous <Continuous>  levETIRAcetam  IVPB 750 milliGRAM(s) IV Intermittent every 12 hours  niCARdipine Infusion 5 mG/Hr (25 mL/Hr) IV Continuous <Continuous>  piperacillin/tazobactam IVPB.. 3.375 Gram(s) IV Intermittent every 8 hours  sodium chloride 3%. 500 milliLiter(s) (30 mL/Hr) IV Continuous <Continuous>  vancomycin  IVPB 1000 milliGRAM(s) IV Intermittent every 12 hours    MEDICATIONS  (PRN):      LABS:  ( @ 03:40)                        11.0  18.54 )-----------( 189                 33.0    Neutrophils = 16.66 (89.9%)  Lymphocytes = 0.84 (4.5%)  Eosinophils = 0.04 (0.2%)  Basophils = 0.04 (0.2%)  Monocytes = 0.79 (4.3%)  Bands = 6.1%    WBC Trend: 18.54<--, 12.24<--, 8.22<--  Hb Trend: 11.0<--, 10.8<--, 14.9<--, 12.7<--  Plt Trend: 189<--, 176<--, 264<--, 244<--      147<H>  |  113<H>  |  14  ----------------------------<  138<H>  3.7   |  19<L>  |  1.07    Ca    9.5      2020 03:40  Phos  2.3       Mg     1.8         TPro  5.7<L>  /  Alb  3.0<L>  /  TBili  0.5  /  DBili  x   /  AST  9   /  ALT  12  /  AlkPhos  81      Creatinine Trend: 1.07<--, 1.01<--, 1.02<--, 0.92<--, 0.81<--, 0.98<--    Urinalysis Basic - ( 2020 13:15 )    Color: LIGHT YELLOWA / Appearance: CLEAR / S.023 / pH: 6.5  Gluc: NEGATIVE / Ketone: NEGATIVE  / Bili: NEGATIVE / Urobili: NORMAL   Blood: NEGATIVE / Protein: 50 / Nitrite: NEGATIVE   Leuk Esterase: NEGATIVE / RBC: 0-2 / WBC 0-2   Sq Epi: OCC / Non Sq Epi: x / Bacteria: NEGATIVE

## 2020-02-22 LAB
ANA TITR SER: NEGATIVE — SIGNIFICANT CHANGE UP
ANION GAP SERPL CALC-SCNC: 15 MMO/L — HIGH (ref 7–14)
B PERT DNA SPEC QL NAA+PROBE: NOT DETECTED — SIGNIFICANT CHANGE UP
BASOPHILS # BLD AUTO: 0.04 K/UL — SIGNIFICANT CHANGE UP (ref 0–0.2)
BASOPHILS NFR BLD AUTO: 0.3 % — SIGNIFICANT CHANGE UP (ref 0–2)
BUN SERPL-MCNC: 12 MG/DL — SIGNIFICANT CHANGE UP (ref 7–23)
C PNEUM DNA SPEC QL NAA+PROBE: NOT DETECTED — SIGNIFICANT CHANGE UP
CALCIUM SERPL-MCNC: 9.7 MG/DL — SIGNIFICANT CHANGE UP (ref 8.4–10.5)
CHLORIDE SERPL-SCNC: 110 MMOL/L — HIGH (ref 98–107)
CO2 SERPL-SCNC: 21 MMOL/L — LOW (ref 22–31)
CREAT SERPL-MCNC: 0.98 MG/DL — SIGNIFICANT CHANGE UP (ref 0.5–1.3)
EOSINOPHIL # BLD AUTO: 0.18 K/UL — SIGNIFICANT CHANGE UP (ref 0–0.5)
EOSINOPHIL NFR BLD AUTO: 1.3 % — SIGNIFICANT CHANGE UP (ref 0–6)
FLUAV H1 2009 PAND RNA SPEC QL NAA+PROBE: NOT DETECTED — SIGNIFICANT CHANGE UP
FLUAV H1 RNA SPEC QL NAA+PROBE: NOT DETECTED — SIGNIFICANT CHANGE UP
FLUAV H3 RNA SPEC QL NAA+PROBE: NOT DETECTED — SIGNIFICANT CHANGE UP
FLUAV SUBTYP SPEC NAA+PROBE: NOT DETECTED — SIGNIFICANT CHANGE UP
FLUBV RNA SPEC QL NAA+PROBE: NOT DETECTED — SIGNIFICANT CHANGE UP
GLUCOSE SERPL-MCNC: 125 MG/DL — HIGH (ref 70–99)
HADV DNA SPEC QL NAA+PROBE: NOT DETECTED — SIGNIFICANT CHANGE UP
HCOV PNL SPEC NAA+PROBE: SIGNIFICANT CHANGE UP
HCT VFR BLD CALC: 35.6 % — LOW (ref 39–50)
HGB BLD-MCNC: 12 G/DL — LOW (ref 13–17)
HMPV RNA SPEC QL NAA+PROBE: NOT DETECTED — SIGNIFICANT CHANGE UP
HPIV1 RNA SPEC QL NAA+PROBE: NOT DETECTED — SIGNIFICANT CHANGE UP
HPIV2 RNA SPEC QL NAA+PROBE: NOT DETECTED — SIGNIFICANT CHANGE UP
HPIV3 RNA SPEC QL NAA+PROBE: NOT DETECTED — SIGNIFICANT CHANGE UP
HPIV4 RNA SPEC QL NAA+PROBE: NOT DETECTED — SIGNIFICANT CHANGE UP
IMM GRANULOCYTES NFR BLD AUTO: 0.4 % — SIGNIFICANT CHANGE UP (ref 0–1.5)
LYMPHOCYTES # BLD AUTO: 1.33 K/UL — SIGNIFICANT CHANGE UP (ref 1–3.3)
LYMPHOCYTES # BLD AUTO: 9.8 % — LOW (ref 13–44)
MAGNESIUM SERPL-MCNC: 1.9 MG/DL — SIGNIFICANT CHANGE UP (ref 1.6–2.6)
MCHC RBC-ENTMCNC: 30.2 PG — SIGNIFICANT CHANGE UP (ref 27–34)
MCHC RBC-ENTMCNC: 33.7 % — SIGNIFICANT CHANGE UP (ref 32–36)
MCV RBC AUTO: 89.4 FL — SIGNIFICANT CHANGE UP (ref 80–100)
METHOD TYPE: SIGNIFICANT CHANGE UP
MONOCYTES # BLD AUTO: 0.67 K/UL — SIGNIFICANT CHANGE UP (ref 0–0.9)
MONOCYTES NFR BLD AUTO: 4.9 % — SIGNIFICANT CHANGE UP (ref 2–14)
NEUTROPHILS # BLD AUTO: 11.28 K/UL — HIGH (ref 1.8–7.4)
NEUTROPHILS NFR BLD AUTO: 83.3 % — HIGH (ref 43–77)
NRBC # FLD: 0 K/UL — SIGNIFICANT CHANGE UP (ref 0–0)
ORGANISM # SPEC MICROSCOPIC CNT: SIGNIFICANT CHANGE UP
ORGANISM # SPEC MICROSCOPIC CNT: SIGNIFICANT CHANGE UP
PHOSPHATE SERPL-MCNC: 3.8 MG/DL — SIGNIFICANT CHANGE UP (ref 2.5–4.5)
PLATELET # BLD AUTO: 189 K/UL — SIGNIFICANT CHANGE UP (ref 150–400)
PMV BLD: 11 FL — SIGNIFICANT CHANGE UP (ref 7–13)
POTASSIUM SERPL-MCNC: 3.5 MMOL/L — SIGNIFICANT CHANGE UP (ref 3.5–5.3)
POTASSIUM SERPL-SCNC: 3.5 MMOL/L — SIGNIFICANT CHANGE UP (ref 3.5–5.3)
RBC # BLD: 3.98 M/UL — LOW (ref 4.2–5.8)
RBC # FLD: 13.3 % — SIGNIFICANT CHANGE UP (ref 10.3–14.5)
RSV RNA SPEC QL NAA+PROBE: NOT DETECTED — SIGNIFICANT CHANGE UP
RV+EV RNA SPEC QL NAA+PROBE: NOT DETECTED — SIGNIFICANT CHANGE UP
SODIUM SERPL-SCNC: 146 MMOL/L — HIGH (ref 135–145)
SPECIMEN SOURCE: SIGNIFICANT CHANGE UP
SPECIMEN SOURCE: SIGNIFICANT CHANGE UP
VANCOMYCIN TROUGH SERPL-MCNC: 12.1 UG/ML — SIGNIFICANT CHANGE UP (ref 10–20)
WBC # BLD: 13.56 K/UL — HIGH (ref 3.8–10.5)
WBC # FLD AUTO: 13.56 K/UL — HIGH (ref 3.8–10.5)

## 2020-02-22 PROCEDURE — 99291 CRITICAL CARE FIRST HOUR: CPT

## 2020-02-22 PROCEDURE — 70450 CT HEAD/BRAIN W/O DYE: CPT | Mod: 26

## 2020-02-22 PROCEDURE — 71045 X-RAY EXAM CHEST 1 VIEW: CPT | Mod: 26

## 2020-02-22 PROCEDURE — 95720 EEG PHY/QHP EA INCR W/VEEG: CPT

## 2020-02-22 PROCEDURE — 76937 US GUIDE VASCULAR ACCESS: CPT | Mod: 26

## 2020-02-22 PROCEDURE — 36010 PLACE CATHETER IN VEIN: CPT

## 2020-02-22 RX ORDER — LABETALOL HCL 100 MG
300 TABLET ORAL EVERY 8 HOURS
Refills: 0 | Status: DISCONTINUED | OUTPATIENT
Start: 2020-02-22 | End: 2020-02-23

## 2020-02-22 RX ORDER — VANCOMYCIN HCL 1 G
1000 VIAL (EA) INTRAVENOUS ONCE
Refills: 0 | Status: COMPLETED | OUTPATIENT
Start: 2020-02-22 | End: 2020-02-22

## 2020-02-22 RX ORDER — PHENOBARBITAL 60 MG
130 TABLET ORAL ONCE
Refills: 0 | Status: DISCONTINUED | OUTPATIENT
Start: 2020-02-22 | End: 2020-02-22

## 2020-02-22 RX ORDER — FENTANYL CITRATE 50 UG/ML
25 INJECTION INTRAVENOUS ONCE
Refills: 0 | Status: DISCONTINUED | OUTPATIENT
Start: 2020-02-22 | End: 2020-02-22

## 2020-02-22 RX ORDER — NICARDIPINE HYDROCHLORIDE 30 MG/1
5 CAPSULE, EXTENDED RELEASE ORAL
Qty: 40 | Refills: 0 | Status: DISCONTINUED | OUTPATIENT
Start: 2020-02-22 | End: 2020-02-23

## 2020-02-22 RX ORDER — HYDRALAZINE HCL 50 MG
10 TABLET ORAL ONCE
Refills: 0 | Status: COMPLETED | OUTPATIENT
Start: 2020-02-22 | End: 2020-02-22

## 2020-02-22 RX ORDER — LABETALOL HCL 100 MG
10 TABLET ORAL ONCE
Refills: 0 | Status: COMPLETED | OUTPATIENT
Start: 2020-02-22 | End: 2020-02-22

## 2020-02-22 RX ORDER — LABETALOL HCL 100 MG
20 TABLET ORAL ONCE
Refills: 0 | Status: COMPLETED | OUTPATIENT
Start: 2020-02-22 | End: 2020-02-22

## 2020-02-22 RX ORDER — LEVETIRACETAM 250 MG/1
750 TABLET, FILM COATED ORAL
Refills: 0 | Status: DISCONTINUED | OUTPATIENT
Start: 2020-02-22 | End: 2020-02-23

## 2020-02-22 RX ORDER — DEXMEDETOMIDINE HYDROCHLORIDE IN 0.9% SODIUM CHLORIDE 4 UG/ML
0.5 INJECTION INTRAVENOUS
Qty: 200 | Refills: 0 | Status: DISCONTINUED | OUTPATIENT
Start: 2020-02-22 | End: 2020-02-23

## 2020-02-22 RX ADMIN — PIPERACILLIN AND TAZOBACTAM 25 GRAM(S): 4; .5 INJECTION, POWDER, LYOPHILIZED, FOR SOLUTION INTRAVENOUS at 12:12

## 2020-02-22 RX ADMIN — PIPERACILLIN AND TAZOBACTAM 25 GRAM(S): 4; .5 INJECTION, POWDER, LYOPHILIZED, FOR SOLUTION INTRAVENOUS at 20:52

## 2020-02-22 RX ADMIN — FAMOTIDINE 20 MILLIGRAM(S): 10 INJECTION INTRAVENOUS at 12:24

## 2020-02-22 RX ADMIN — PIPERACILLIN AND TAZOBACTAM 25 GRAM(S): 4; .5 INJECTION, POWDER, LYOPHILIZED, FOR SOLUTION INTRAVENOUS at 03:54

## 2020-02-22 RX ADMIN — FENTANYL CITRATE 25 MICROGRAM(S): 50 INJECTION INTRAVENOUS at 15:25

## 2020-02-22 RX ADMIN — ATORVASTATIN CALCIUM 10 MILLIGRAM(S): 80 TABLET, FILM COATED ORAL at 21:31

## 2020-02-22 RX ADMIN — Medication 250 MILLIGRAM(S): at 21:59

## 2020-02-22 RX ADMIN — FENTANYL CITRATE 25 MICROGRAM(S): 50 INJECTION INTRAVENOUS at 15:09

## 2020-02-22 RX ADMIN — NICARDIPINE HYDROCHLORIDE 25 MG/HR: 30 CAPSULE, EXTENDED RELEASE ORAL at 20:52

## 2020-02-22 RX ADMIN — DEXMEDETOMIDINE HYDROCHLORIDE IN 0.9% SODIUM CHLORIDE 8.43 MICROGRAM(S)/KG/HR: 4 INJECTION INTRAVENOUS at 20:53

## 2020-02-22 RX ADMIN — Medication 20 MILLIGRAM(S): at 14:18

## 2020-02-22 RX ADMIN — NICARDIPINE HYDROCHLORIDE 25 MG/HR: 30 CAPSULE, EXTENDED RELEASE ORAL at 16:38

## 2020-02-22 RX ADMIN — Medication 130 MILLIGRAM(S): at 13:44

## 2020-02-22 RX ADMIN — CHLORHEXIDINE GLUCONATE 1 APPLICATION(S): 213 SOLUTION TOPICAL at 12:12

## 2020-02-22 RX ADMIN — Medication 650 MILLIGRAM(S): at 16:58

## 2020-02-22 RX ADMIN — Medication 10 MILLIGRAM(S): at 04:41

## 2020-02-22 RX ADMIN — DEXMEDETOMIDINE HYDROCHLORIDE IN 0.9% SODIUM CHLORIDE 3.37 MICROGRAM(S)/KG/HR: 4 INJECTION INTRAVENOUS at 08:00

## 2020-02-22 RX ADMIN — Medication 300 MILLIGRAM(S): at 21:31

## 2020-02-22 RX ADMIN — Medication 200 MILLIGRAM(S): at 05:11

## 2020-02-22 RX ADMIN — Medication 0.1 MILLIGRAM(S): at 14:18

## 2020-02-22 RX ADMIN — Medication 650 MILLIGRAM(S): at 16:33

## 2020-02-22 RX ADMIN — Medication 300 MILLIGRAM(S): at 13:01

## 2020-02-22 RX ADMIN — Medication 250 MILLIGRAM(S): at 05:11

## 2020-02-22 RX ADMIN — Medication 10 MILLIGRAM(S): at 11:40

## 2020-02-22 RX ADMIN — LEVETIRACETAM 400 MILLIGRAM(S): 250 TABLET, FILM COATED ORAL at 04:40

## 2020-02-22 RX ADMIN — Medication 0.1 MILLIGRAM(S): at 21:30

## 2020-02-22 RX ADMIN — AMLODIPINE BESYLATE 10 MILLIGRAM(S): 2.5 TABLET ORAL at 05:11

## 2020-02-22 RX ADMIN — LEVETIRACETAM 750 MILLIGRAM(S): 250 TABLET, FILM COATED ORAL at 19:15

## 2020-02-22 RX ADMIN — NICARDIPINE HYDROCHLORIDE 25 MG/HR: 30 CAPSULE, EXTENDED RELEASE ORAL at 18:08

## 2020-02-22 NOTE — PHYSICAL THERAPY INITIAL EVALUATION ADULT - PLANNED THERAPY INTERVENTIONS, PT EVAL
gait training/motor coordination training/strengthening/balance training/bed mobility training/transfer training/Patient left supine in bed in NAD, call bell in reach, all lines intact. BRENDA Luciano at bedside.

## 2020-02-22 NOTE — PHYSICAL THERAPY INITIAL EVALUATION ADULT - ACTIVE RANGE OF MOTION EXAMINATION, REHAB EVAL
Right UE Active ROM was WFL (within functional limits)/Right LE Active ROM was WFL (within functional limits)/left UE mild increased extensor tone; left LE increased extensor tone.

## 2020-02-22 NOTE — PROGRESS NOTE ADULT - ASSESSMENT
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     Plan:  [] Hematology consult regarding hypercoag workup   [] MRI brain w + w/o contrast  [] Can hold vEEG for now  [] Q1H neuro checks  [] SBP goal < 160  [] Continue Keppra 500mg BID  [] TTE with bubble, hypercoaguable workup  [x] Dopplers LE negative   [] Labs for primary autoimmune condition pending result (C-ANCA/P-ANCA/ESR/CRP/LYNSEY/RF/DsDNA)  [] Coagulation disorder labs

## 2020-02-22 NOTE — PROGRESS NOTE ADULT - ASSESSMENT
40 y/o M w/ Mhx HTN, T2DM, prior ETOH and cocaine abuse with history of recent R basal ganglia hemorrhagic stroke (with residual LUE and LLE weakness), L thalamic ischemic stroke, and R parietal density transferred from  where he presented with AMS/obtundation preceded by dizziness, HA, and worsening left sided weakness found to have ICH, intubated for airway protection w/ hospital course c/b LLE shaking presumed to be seizure requiring keppra and ativan. Patient was transferred to [facility] for neuro eval and EEG, now extubated. CT showing stable hemorrhage, currently undergoing workup for hemorrhagic stroke etiology and being managed for possible EtOH withdrawal given persistent tachycardia/HTN despite max doses of nicardipine.    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 with vasogenic edema  - 24h head CT showed stable hemorrhage  - elevated HOB 30 degrees   - keppra increased to 750mg bid per neuro recs  - Goal SBP <160  - currently holding nicardipine, coming down on precedex  - c/w labetalol 200 BID  - q1h neuro checks  - 3% saline d/c given 72 hours s/p stroke  - c/w video EEG  - no seizure seen on EEG, but noted to have potential epileptogenic focus in the right parietal region, cortical dysfunction and structural abnormality in the right hemisphere, and mild nonspecific diffuse or multifocal cerebral dysfunction  - MRI brain w, w/o when able  - TTE showed increased LV wall thickness, bubble study was unsuccessful due to poor visualization  - LE duplex negative for DVTs  - f/u hypercoagulable work up - results so far show decreased protein S activity, low factor II assay, high factor VIII assay  - f/u neuro and neurosurgery recs     CARDIAC: h/o HTN  - goal SBP < 160  - holding cardine drip, coming down on precedex, c/w labetalol 200 TID   - has been having tachy/HTN, managing with anti-hypertensives as needed  -Patient w/ htn/tachycardia initially thought due to alcohol withdrawal, refractory to phenobarb today. Will use ativan PRN to tx for cocaine withdrawal    RESPIRATORY  -on 2L NC  -c/w vanc/zosyn 2/20 Day 2 for possible aspiration PNA    ID  -febrile o/n again with increasing leukocytosis  -elevated procal  -c/w vanc/zosyn for possible aspiration PNA  -fu BCx  -fu resp culture/gram stain    GI  -NPO for now    ENDO  -no acute issues  - ISS; A1C 4.9    RENAL  -d/c hypertonic saline given that it is 72 hrs s/p stroke/24 hrs s/p last stable ct scan    HEME/ONC:  -DVT PPX- SCDs 40 y/o M w/ Mhx HTN, T2DM, prior ETOH and cocaine abuse with history of recent R basal ganglia hemorrhagic stroke (with residual LUE and LLE weakness), L thalamic ischemic stroke, and R parietal density transferred from  where he presented with AMS/obtundation preceded by dizziness, HA, and worsening left sided weakness found to have ICH, intubated for airway protection w/ hospital course c/b LLE shaking presumed to be seizure requiring keppra and ativan. Patient was transferred to [facility] for neuro eval and EEG, now extubated. CT showing stable hemorrhage, currently undergoing workup for hemorrhagic stroke etiology and being managed for possible EtOH withdrawal given persistent tachycardia/HTN despite max doses of nicardipine.    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 with vasogenic edema  - 24h head CT showed stable hemorrhage  - elevated HOB 30 degrees   - keppra increased to 750mg bid per neuro recs  - Goal SBP <160  - labetalol increased to 300mg BID  - q1h neuro checks  - 3% saline d/c given 72 hours s/p stroke  - c/w video EEG  - no seizure seen on EEG, but noted to have potential epileptogenic focus in the right parietal region, cortical dysfunction and structural abnormality in the right hemisphere, and mild nonspecific diffuse or multifocal cerebral dysfunction  - MRI brain w, w/o when able  - TTE showed increased LV wall thickness, bubble study was unsuccessful due to poor visualization  - LE duplex negative for DVTs  - f/u hypercoagulable work up - results so far show decreased protein S activity, low factor II assay, high factor VIII assay  - neuro recs appreciated, will get heme consult for hypercoagulable w/u in a non-acute setting once pt. has been downgraded from the MICU    CARDIAC: h/o HTN  - goal SBP < 160  - holding cardine gtt, precedex dc/d  - labetalol increased to 300mg bid  - has been having tachy/HTN, managing with anti-hypertensives as needed  - Patient w/ htn/tachycardia initially thought due to alcohol withdrawal, refractory to phenobarb today. Will use ativan PRN to tx for cocaine withdrawal    RESPIRATORY  -on 2L NC  -c/w zosyn 2/20 Day 2 for possible aspiration PNA  -vanc dc/d    ID  -febrile o/n again with increasing leukocytosis  -elevated procal  -c/w zosyn for possible aspiration PNA  -vanc dc/d  -BCx negative at 24 hours  -fu resp culture/gram stain    GI  -on tube feeds    ENDO  -no acute issues  - ISS; A1C 4.9    RENAL  -d/c hypertonic saline given that it is 72 hrs s/p stroke/24 hrs s/p last stable ct scan    HEME/ONC:  -DVT PPX- SCDs

## 2020-02-22 NOTE — PROGRESS NOTE ADULT - SUBJECTIVE AND OBJECTIVE BOX
INTERVAL HPI/OVERNIGHT EVENTS:    SUBJECTIVE: Patient seen and examined at bedside.     CONSTITUTIONAL: No weakness, fevers or chills  EYES/ENT: No visual changes;  No vertigo or throat pain   NECK: No pain or stiffness  RESPIRATORY: No cough, wheezing, hemoptysis; No shortness of breath  CARDIOVASCULAR: No chest pain or palpitations  GASTROINTESTINAL: No abdominal or epigastric pain. No nausea, vomiting, or hematemesis; No diarrhea or constipation. No melena or hematochezia.  GENITOURINARY: No dysuria, frequency or hematuria  NEUROLOGICAL: No numbness or weakness  SKIN: No itching, rashes    OBJECTIVE:    VITAL SIGNS:  ICU Vital Signs Last 24 Hrs  T(C): 37 (22 Feb 2020 00:00), Max: 38.2 (21 Feb 2020 20:00)  T(F): 98.6 (22 Feb 2020 00:00), Max: 100.8 (21 Feb 2020 20:00)  HR: 64 (22 Feb 2020 07:00) (53 - 175)  BP: 144/100 (22 Feb 2020 07:00) (126/85 - 192/125)  BP(mean): 114 (22 Feb 2020 07:00) (99 - 141)  ABP: --  ABP(mean): --  RR: 16 (22 Feb 2020 07:00) (14 - 25)  SpO2: 100% (22 Feb 2020 07:00) (88% - 100%)        02-21 @ 07:01  -  02-22 @ 07:00  --------------------------------------------------------  IN: 1814.1 mL / OUT: 2725 mL / NET: -910.9 mL      CAPILLARY BLOOD GLUCOSE          PHYSICAL EXAM:    General: NAD  HEENT: NC/AT; PERRL, clear conjunctiva  Neck: supple  Respiratory: CTA b/l  Cardiovascular: +S1/S2; RRR  Abdomen: soft, NT/ND; +BS x4  Extremities: WWP, 2+ peripheral pulses b/l; no LE edema  Skin: normal color and turgor; no rash  Neurological:    MEDICATIONS:  MEDICATIONS  (STANDING):  amLODIPine   Tablet 10 milliGRAM(s) Oral daily  atorvastatin 10 milliGRAM(s) Oral at bedtime  chlorhexidine 4% Liquid 1 Application(s) Topical <User Schedule>  dexMEDEtomidine Infusion 0.2 MICROgram(s)/kG/Hr (3.37 mL/Hr) IV Continuous <Continuous>  famotidine    Tablet 20 milliGRAM(s) Oral daily  labetalol 200 milliGRAM(s) Oral every 8 hours  levETIRAcetam  IVPB 750 milliGRAM(s) IV Intermittent every 12 hours  piperacillin/tazobactam IVPB.. 3.375 Gram(s) IV Intermittent every 8 hours  vancomycin  IVPB 1000 milliGRAM(s) IV Intermittent every 12 hours    MEDICATIONS  (PRN):  acetaminophen    Suspension .. 650 milliGRAM(s) Enteral Tube every 6 hours PRN Temp greater or equal to 38C (100.4F)  PHENobarbital Injectable 130 milliGRAM(s) IV Push every 30 minutes PRN agitation      ALLERGIES:  Allergies    No Known Allergies    Intolerances        LABS:                        12.0   13.56 )-----------( 189      ( 22 Feb 2020 02:15 )             35.6     02-22    146<H>  |  110<H>  |  12  ----------------------------<  125<H>  3.5   |  21<L>  |  0.98    Ca    9.7      22 Feb 2020 02:15  Phos  3.8     02-22  Mg     1.9     02-22            RADIOLOGY & ADDITIONAL TESTS: Reviewed. INTERVAL HPI/OVERNIGHT EVENTS:  Febrile to 100.8, cultures negative for 24 hours so another set not sent.  Precedex increased from 1.2 to 1.5 overnight, but has been dc/d since 8:30am on 2/22/2020.    SUBJECTIVE: Patient seen and examined at bedside.     CONSTITUTIONAL: No weakness, fevers or chills  EYES/ENT: No visual changes;  No vertigo or throat pain   NECK: No pain or stiffness  RESPIRATORY: No cough, wheezing, hemoptysis; No shortness of breath  CARDIOVASCULAR: No chest pain or palpitations  GASTROINTESTINAL: No abdominal or epigastric pain. No nausea, vomiting, or hematemesis; No diarrhea or constipation. No melena or hematochezia.  GENITOURINARY: No dysuria, frequency or hematuria  NEUROLOGICAL: No numbness or weakness  SKIN: No itching, rashes    OBJECTIVE:    VITAL SIGNS:  ICU Vital Signs Last 24 Hrs  T(C): 37 (22 Feb 2020 00:00), Max: 38.2 (21 Feb 2020 20:00)  T(F): 98.6 (22 Feb 2020 00:00), Max: 100.8 (21 Feb 2020 20:00)  HR: 64 (22 Feb 2020 07:00) (53 - 175)  BP: 144/100 (22 Feb 2020 07:00) (126/85 - 192/125)  BP(mean): 114 (22 Feb 2020 07:00) (99 - 141)  ABP: --  ABP(mean): --  RR: 16 (22 Feb 2020 07:00) (14 - 25)  SpO2: 100% (22 Feb 2020 07:00) (88% - 100%)        02-21 @ 07:01  -  02-22 @ 07:00  --------------------------------------------------------  IN: 1814.1 mL / OUT: 2725 mL / NET: -910.9 mL      CAPILLARY BLOOD GLUCOSE          PHYSICAL EXAM:    General: NAD  HEENT: NC/AT; PERRL, clear conjunctiva  Neck: supple  Respiratory: CTA b/l  Cardiovascular: +S1/S2; RRR  Abdomen: soft, NT/ND; +BS x4  Extremities: WWP, 2+ peripheral pulses b/l; no LE edema  Skin: normal color and turgor; no rash  Neurological:    MEDICATIONS:  MEDICATIONS  (STANDING):  amLODIPine   Tablet 10 milliGRAM(s) Oral daily  atorvastatin 10 milliGRAM(s) Oral at bedtime  chlorhexidine 4% Liquid 1 Application(s) Topical <User Schedule>  dexMEDEtomidine Infusion 0.2 MICROgram(s)/kG/Hr (3.37 mL/Hr) IV Continuous <Continuous>  famotidine    Tablet 20 milliGRAM(s) Oral daily  labetalol 200 milliGRAM(s) Oral every 8 hours  levETIRAcetam  IVPB 750 milliGRAM(s) IV Intermittent every 12 hours  piperacillin/tazobactam IVPB.. 3.375 Gram(s) IV Intermittent every 8 hours  vancomycin  IVPB 1000 milliGRAM(s) IV Intermittent every 12 hours    MEDICATIONS  (PRN):  acetaminophen    Suspension .. 650 milliGRAM(s) Enteral Tube every 6 hours PRN Temp greater or equal to 38C (100.4F)  PHENobarbital Injectable 130 milliGRAM(s) IV Push every 30 minutes PRN agitation      ALLERGIES:  Allergies    No Known Allergies    Intolerances        LABS:                        12.0   13.56 )-----------( 189      ( 22 Feb 2020 02:15 )             35.6     02-22    146<H>  |  110<H>  |  12  ----------------------------<  125<H>  3.5   |  21<L>  |  0.98    Ca    9.7      22 Feb 2020 02:15  Phos  3.8     02-22  Mg     1.9     02-22            RADIOLOGY & ADDITIONAL TESTS: Reviewed.

## 2020-02-22 NOTE — CHART NOTE - NSCHARTNOTEFT_GEN_A_CORE
Critically ill pt requiring PIV access for medical management and/or pressor support. Under US guidance identified Rt UE brachial vein and successfully placed 18g Powerglide midline into vessel.  Placement confirmed s/p with ultrasound and catheter determined to be in patent lumen of vein. Pt tolerated well w/o complication.

## 2020-02-22 NOTE — PROGRESS NOTE ADULT - ATTENDING COMMENTS
The patient remains sedated while undergoing video EEG monitoring.  He did not regain consciousness after the Precedex was discontinued for several minutes.  There are bilateral corneal mandibular reflexes with reactive and symmetrical pupils.  Oculocephalic reflexes are intact.  There is no posturing to noxious stimuli and there is flaccid tone on the right.  EEG shows no active seizure activity although there is a potential focus in the area of previous surgery on the right.  Awaiting follow-up MRI along with hematological evaluation for hypercoagulability.  Would repeat CT scan of the head while waiting for MRI.

## 2020-02-22 NOTE — EEG REPORT - NS EEG TEXT BOX
[facility] EPILEPSY CENTER   REPORT OF CONTINUOUS VIDEO EEG     [facility]: [address], [place] [address]  [facility]: [id number], [place] [address]  [facility]: [address] [place] [address]    Patient Name: LUIS EDUARDO FRANKEL  Age and : 40y (82)  MRN #: [id number]  Location: [place] [address]  Referring Physician: Cristiane Ernst    Start Time/Date: 08:00 on 20  End Time/Date: 08:00 on 20  Duration: 24hrs    _____________________________________________________________  STUDY INFORMATION    EEG Recording Technique:  The patient underwent continuous Video-EEG monitoring, using Telemetry System hardware on the XLTek Digital System. EEG and video data were stored on a computer hard drive with important events saved in digital archive files. The material was reviewed by a physician (electroencephalographer / epileptologist) on a daily basis. Christopher and seizure detection algorithms were utilized and reviewed. An EEG Technician attended to the patient, and was available throughout daytime work hours.  The epilepsy center neurologist was available in person or on call 24-hours per day.    EEG Placement and Labeling of Electrodes:  The EEG was performed utilizing 20 channel referential EEG connections (coronal over temporal over parasagittal montage) using all standard 10-20 electrode placements with EKG, with additional electrodes placed in the inferior temporal region using the modified 10-10 montage electrode placements for elective admissions, or if deemed necessary. Recording was at a sampling rate of 256 samples per second per channel. Time synchronized digital video recording was done simultaneously with EEG recording. A low light infrared camera was used for low light recording.     _____________________________________________________________  HISTORY    Patient is a 40y old  Male who presents with a chief complaint of AMS.    PERTINENT MEDICATION:  levETIRAcetam  IVPB 750 milliGRAM(s) IV Intermittent every 12 hours  _____________________________________________________________  STUDY INTERPRETATION    Findings: The background was continuous, spontaneously variable and reactive. During wakefulness, the posterior dominant rhythm consisted of asymmetric, poorly-modulated 8 Hz activity, with amplitude to 30 uV, that was seen over the left hemisphere.     Background Slowing:  Diffuse delta with superimposed faster activities.    Focal Slowing:   Continuous polymorphic delta slowing in the right hemisphere.     Sleep Background:  Drowsiness was characterized by fragmentation, attenuation, and slowing of the background activity.    Sleep was characterized by the presence of asymmetric, rudimentary spindles and K-complexes seen over the left hemisphere only.    Other Non-Epileptiform Findings:  Attenuation of fast activity in the right hemisphere.  Breach effect in right hemisphere, max parietally, characterized by higher amplitude and sharply contoured waves.     Interictal Epileptiform Activity:   Occasional right parietal (P4) sharp wave discharges.     Events:  Clinical events: One push button event for unclear clinical reasons.     Activation Procedures:   Hyperventilation was not performed.    Photic stimulation was performed and did not elicit any abnormality.     Artifacts:  Intermittent myogenic and movement artifacts were noted.    ECG:  The heart rate on single channel ECG was predominantly between 60-70 BPM.    _____________________________________________________________  EEG SUMMARY/CLASSIFICATION    Abnormal EEG in an altered patient.  - Occasional right parietal (P4) sharp wave discharges.  - Continuous polymorphic delta slowing in the right hemisphere.   - Attenuation of fast activity in the right hemisphere.  - Mild to moderate generalized slowing.  - Breach effect in right hemisphere, max parietally, characterized by higher amplitude and sharply contoured waves.     _____________________________________________________________  EEG IMPRESSION/CLINICAL CORRELATE    Abnormal EEG study.  1. Potential epileptogenic focus in the right parietal region.   2. Cortical dysfunction and structural abnormality in the right hemisphere.  3. Mild to moderate nonspecific diffuse or multifocal cerebral dysfunction.   4. No seizure seen.  5. 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       Urbano Colvin MD  Attending Physician, [facility]ile[facility] [place]

## 2020-02-22 NOTE — PHYSICAL THERAPY INITIAL EVALUATION ADULT - DISCHARGE DISPOSITION, PT EVAL
Patient to be placed on a trial PT period for 1-2 more sessions to see if patient would benefit from skilled PT intervention.

## 2020-02-22 NOTE — PROGRESS NOTE ADULT - SUBJECTIVE AND OBJECTIVE BOX
INTERVAL:     -No acute events  -EEG demonstrates no seizures. Potential epileptogenic focus in the right parietal region.     Vitals:   T(F): 96.8  HR: 113  BP: 160/104  RR: 26  SpO2: 97%    Exam:   MS: Eyes closed. Does not grimace to painful stimuli.   CN: Pupils equal and reactive. BTT bilaterally. Corneal mandibular reflex present. OCR intact.   Motor: Low tone bilaterally.     LABS:   02-22 Na 146<H>  02-22 K 3.5  02-22 P 3.8  02-22 Mg 1.9  02-22 Ca 9.7  02-22 Cr 0.98          IMAGING:

## 2020-02-22 NOTE — PROGRESS NOTE ADULT - ATTENDING COMMENTS
I personally interviewed and reviewed the plan of therapy with the resident at bedside.     Red this morning was awake and alert, able to communicate in Nepali, remains hypertensive and tachycardic. There remains concerns for some form of withdraw syndrome, he's received phenobarb with minimal response. We'll increase his antihypertensives and attempted to adjunct pain control (CPOT >2). Appreciate the recommendations from neurology- MRI pending, would prefer to do during the day, will require coordination with MRI Team; hematology will be contacted after the acute phase of Red's condition (which he remains in), as the coagulopathic work-up may require repeating and/or could be skewed by the acute state. He is on pipercillin/tazobactam for possible aspiration event, if his fever curve, clinical status, and WBCs trend well (i.e. afebrile, reduced leukocytosis) then we could stop this; vancomycin d/c'd today.  He remains on the continuous EEG with intermittent hypertensive crisis, if we can increase his PO medications, control any possible withdraw syndrome, and hopefully remove the continuous EEG by tomorrow then we'll be able to liberate him from the Critical Care Area in the next day or so.

## 2020-02-22 NOTE — PHYSICAL THERAPY INITIAL EVALUATION ADULT - ADDITIONAL COMMENTS
family at bedside provided all history; patient owns a hospital bed, commode, wheelchair; patient's family lifts him upstairs; patient was able to ambulate with assist x 1 to chair or wheelchair; patient able to bear weight on right LE and drag left LE with assist.

## 2020-02-22 NOTE — PHYSICAL THERAPY INITIAL EVALUATION ADULT - IMPAIRMENTS FOUND, PT EVAL
aerobic capacity/endurance/muscle strength/tone/poor safety awareness/ROM/arousal, attention, and cognition/gait, locomotion, and balance/posture

## 2020-02-23 LAB
ALBUMIN SERPL ELPH-MCNC: 3.7 G/DL — SIGNIFICANT CHANGE UP (ref 3.3–5)
ALP SERPL-CCNC: 97 U/L — SIGNIFICANT CHANGE UP (ref 40–120)
ALT FLD-CCNC: 14 U/L — SIGNIFICANT CHANGE UP (ref 4–41)
ANION GAP SERPL CALC-SCNC: 13 MMO/L — SIGNIFICANT CHANGE UP (ref 7–14)
ANION GAP SERPL CALC-SCNC: 16 MMO/L — HIGH (ref 7–14)
AST SERPL-CCNC: 11 U/L — SIGNIFICANT CHANGE UP (ref 4–40)
BILIRUB SERPL-MCNC: 0.5 MG/DL — SIGNIFICANT CHANGE UP (ref 0.2–1.2)
BUN SERPL-MCNC: 17 MG/DL — SIGNIFICANT CHANGE UP (ref 7–23)
BUN SERPL-MCNC: 6 MG/DL — LOW (ref 7–23)
CALCIUM SERPL-MCNC: 9.5 MG/DL — SIGNIFICANT CHANGE UP (ref 8.4–10.5)
CALCIUM SERPL-MCNC: 9.6 MG/DL — SIGNIFICANT CHANGE UP (ref 8.4–10.5)
CHLORIDE SERPL-SCNC: 102 MMOL/L — SIGNIFICANT CHANGE UP (ref 98–107)
CHLORIDE SERPL-SCNC: 109 MMOL/L — HIGH (ref 98–107)
CO2 SERPL-SCNC: 21 MMOL/L — LOW (ref 22–31)
CO2 SERPL-SCNC: 23 MMOL/L — SIGNIFICANT CHANGE UP (ref 22–31)
CREAT SERPL-MCNC: 1.19 MG/DL — SIGNIFICANT CHANGE UP (ref 0.5–1.3)
CREAT SERPL-MCNC: 1.22 MG/DL — SIGNIFICANT CHANGE UP (ref 0.5–1.3)
GLUCOSE SERPL-MCNC: 145 MG/DL — HIGH (ref 70–99)
GLUCOSE SERPL-MCNC: 178 MG/DL — HIGH (ref 70–99)
HCT VFR BLD CALC: 36.4 % — LOW (ref 39–50)
HGB BLD-MCNC: 12.4 G/DL — LOW (ref 13–17)
MAGNESIUM SERPL-MCNC: 2 MG/DL — SIGNIFICANT CHANGE UP (ref 1.6–2.6)
MAGNESIUM SERPL-MCNC: 2 MG/DL — SIGNIFICANT CHANGE UP (ref 1.6–2.6)
MCHC RBC-ENTMCNC: 29.5 PG — SIGNIFICANT CHANGE UP (ref 27–34)
MCHC RBC-ENTMCNC: 34.1 % — SIGNIFICANT CHANGE UP (ref 32–36)
MCV RBC AUTO: 86.5 FL — SIGNIFICANT CHANGE UP (ref 80–100)
NRBC # FLD: 0 K/UL — SIGNIFICANT CHANGE UP (ref 0–0)
ORGANISM # SPEC MICROSCOPIC CNT: SIGNIFICANT CHANGE UP
PHOSPHATE SERPL-MCNC: 2.4 MG/DL — LOW (ref 2.5–4.5)
PHOSPHATE SERPL-MCNC: 3.7 MG/DL — SIGNIFICANT CHANGE UP (ref 2.5–4.5)
PLATELET # BLD AUTO: 259 K/UL — SIGNIFICANT CHANGE UP (ref 150–400)
PMV BLD: 10.8 FL — SIGNIFICANT CHANGE UP (ref 7–13)
POTASSIUM SERPL-MCNC: 2.5 MMOL/L — CRITICAL LOW (ref 3.5–5.3)
POTASSIUM SERPL-MCNC: 4 MMOL/L — SIGNIFICANT CHANGE UP (ref 3.5–5.3)
POTASSIUM SERPL-SCNC: 2.5 MMOL/L — CRITICAL LOW (ref 3.5–5.3)
POTASSIUM SERPL-SCNC: 4 MMOL/L — SIGNIFICANT CHANGE UP (ref 3.5–5.3)
PROT SERPL-MCNC: 7.3 G/DL — SIGNIFICANT CHANGE UP (ref 6–8.3)
RBC # BLD: 4.21 M/UL — SIGNIFICANT CHANGE UP (ref 4.2–5.8)
RBC # FLD: 13.1 % — SIGNIFICANT CHANGE UP (ref 10.3–14.5)
SODIUM SERPL-SCNC: 141 MMOL/L — SIGNIFICANT CHANGE UP (ref 135–145)
SODIUM SERPL-SCNC: 143 MMOL/L — SIGNIFICANT CHANGE UP (ref 135–145)
SPECIMEN SOURCE: SIGNIFICANT CHANGE UP
WBC # BLD: 11.14 K/UL — HIGH (ref 3.8–10.5)
WBC # FLD AUTO: 11.14 K/UL — HIGH (ref 3.8–10.5)

## 2020-02-23 PROCEDURE — 95720 EEG PHY/QHP EA INCR W/VEEG: CPT

## 2020-02-23 PROCEDURE — 71045 X-RAY EXAM CHEST 1 VIEW: CPT | Mod: 26

## 2020-02-23 PROCEDURE — 99291 CRITICAL CARE FIRST HOUR: CPT

## 2020-02-23 PROCEDURE — 99254 IP/OBS CNSLTJ NEW/EST MOD 60: CPT | Mod: GC

## 2020-02-23 RX ORDER — POTASSIUM PHOSPHATE, MONOBASIC POTASSIUM PHOSPHATE, DIBASIC 236; 224 MG/ML; MG/ML
15 INJECTION, SOLUTION INTRAVENOUS ONCE
Refills: 0 | Status: COMPLETED | OUTPATIENT
Start: 2020-02-23 | End: 2020-02-23

## 2020-02-23 RX ORDER — HYDRALAZINE HCL 50 MG
100 TABLET ORAL THREE TIMES A DAY
Refills: 0 | Status: DISCONTINUED | OUTPATIENT
Start: 2020-02-23 | End: 2020-02-25

## 2020-02-23 RX ORDER — LEVETIRACETAM 250 MG/1
1000 TABLET, FILM COATED ORAL
Refills: 0 | Status: DISCONTINUED | OUTPATIENT
Start: 2020-02-23 | End: 2020-02-25

## 2020-02-23 RX ORDER — ACETAMINOPHEN 500 MG
650 TABLET ORAL EVERY 6 HOURS
Refills: 0 | Status: COMPLETED | OUTPATIENT
Start: 2020-02-23 | End: 2020-02-25

## 2020-02-23 RX ORDER — VANCOMYCIN HCL 1 G
1000 VIAL (EA) INTRAVENOUS ONCE
Refills: 0 | Status: DISCONTINUED | OUTPATIENT
Start: 2020-02-23 | End: 2020-02-23

## 2020-02-23 RX ORDER — CHLORHEXIDINE GLUCONATE 213 G/1000ML
15 SOLUTION TOPICAL
Refills: 0 | Status: DISCONTINUED | OUTPATIENT
Start: 2020-02-23 | End: 2020-03-10

## 2020-02-23 RX ORDER — VANCOMYCIN HCL 1 G
1000 VIAL (EA) INTRAVENOUS
Refills: 0 | Status: DISCONTINUED | OUTPATIENT
Start: 2020-02-23 | End: 2020-02-25

## 2020-02-23 RX ORDER — POTASSIUM CHLORIDE 20 MEQ
10 PACKET (EA) ORAL
Refills: 0 | Status: COMPLETED | OUTPATIENT
Start: 2020-02-23 | End: 2020-02-23

## 2020-02-23 RX ORDER — FENTANYL CITRATE 50 UG/ML
25 INJECTION INTRAVENOUS ONCE
Refills: 0 | Status: DISCONTINUED | OUTPATIENT
Start: 2020-02-23 | End: 2020-02-23

## 2020-02-23 RX ORDER — LABETALOL HCL 100 MG
10 TABLET ORAL ONCE
Refills: 0 | Status: COMPLETED | OUTPATIENT
Start: 2020-02-23 | End: 2020-02-23

## 2020-02-23 RX ORDER — LABETALOL HCL 100 MG
400 TABLET ORAL EVERY 8 HOURS
Refills: 0 | Status: DISCONTINUED | OUTPATIENT
Start: 2020-02-23 | End: 2020-02-25

## 2020-02-23 RX ORDER — VANCOMYCIN HCL 1 G
1250 VIAL (EA) INTRAVENOUS EVERY 12 HOURS
Refills: 0 | Status: DISCONTINUED | OUTPATIENT
Start: 2020-02-23 | End: 2020-02-23

## 2020-02-23 RX ORDER — POTASSIUM CHLORIDE 20 MEQ
40 PACKET (EA) ORAL
Refills: 0 | Status: COMPLETED | OUTPATIENT
Start: 2020-02-23 | End: 2020-02-23

## 2020-02-23 RX ORDER — LEVETIRACETAM 250 MG/1
1000 TABLET, FILM COATED ORAL
Refills: 0 | Status: DISCONTINUED | OUTPATIENT
Start: 2020-02-23 | End: 2020-02-23

## 2020-02-23 RX ORDER — LABETALOL HCL 100 MG
100 TABLET ORAL ONCE
Refills: 0 | Status: COMPLETED | OUTPATIENT
Start: 2020-02-23 | End: 2020-02-23

## 2020-02-23 RX ORDER — AMITRIPTYLINE HCL 25 MG
50 TABLET ORAL DAILY
Refills: 0 | Status: DISCONTINUED | OUTPATIENT
Start: 2020-02-23 | End: 2020-02-25

## 2020-02-23 RX ADMIN — Medication 250 MILLIGRAM(S): at 18:12

## 2020-02-23 RX ADMIN — Medication 40 MILLIEQUIVALENT(S): at 04:45

## 2020-02-23 RX ADMIN — Medication 100 MILLIEQUIVALENT(S): at 05:52

## 2020-02-23 RX ADMIN — LEVETIRACETAM 1000 MILLIGRAM(S): 250 TABLET, FILM COATED ORAL at 18:12

## 2020-02-23 RX ADMIN — Medication 650 MILLIGRAM(S): at 15:38

## 2020-02-23 RX ADMIN — Medication 650 MILLIGRAM(S): at 09:56

## 2020-02-23 RX ADMIN — Medication 650 MILLIGRAM(S): at 16:28

## 2020-02-23 RX ADMIN — PIPERACILLIN AND TAZOBACTAM 25 GRAM(S): 4; .5 INJECTION, POWDER, LYOPHILIZED, FOR SOLUTION INTRAVENOUS at 03:02

## 2020-02-23 RX ADMIN — Medication 10 MILLIGRAM(S): at 18:53

## 2020-02-23 RX ADMIN — DEXMEDETOMIDINE HYDROCHLORIDE IN 0.9% SODIUM CHLORIDE 8.43 MICROGRAM(S)/KG/HR: 4 INJECTION INTRAVENOUS at 08:00

## 2020-02-23 RX ADMIN — PIPERACILLIN AND TAZOBACTAM 25 GRAM(S): 4; .5 INJECTION, POWDER, LYOPHILIZED, FOR SOLUTION INTRAVENOUS at 12:19

## 2020-02-23 RX ADMIN — Medication 650 MILLIGRAM(S): at 22:08

## 2020-02-23 RX ADMIN — Medication 40 MILLIEQUIVALENT(S): at 05:09

## 2020-02-23 RX ADMIN — Medication 100 MILLIEQUIVALENT(S): at 06:41

## 2020-02-23 RX ADMIN — FAMOTIDINE 20 MILLIGRAM(S): 10 INJECTION INTRAVENOUS at 12:19

## 2020-02-23 RX ADMIN — Medication 100 MILLIEQUIVALENT(S): at 04:45

## 2020-02-23 RX ADMIN — Medication 300 MILLIGRAM(S): at 14:25

## 2020-02-23 RX ADMIN — ATORVASTATIN CALCIUM 10 MILLIGRAM(S): 80 TABLET, FILM COATED ORAL at 22:08

## 2020-02-23 RX ADMIN — FENTANYL CITRATE 25 MICROGRAM(S): 50 INJECTION INTRAVENOUS at 14:01

## 2020-02-23 RX ADMIN — Medication 50 MILLIGRAM(S): at 20:03

## 2020-02-23 RX ADMIN — Medication 100 MILLIGRAM(S): at 22:08

## 2020-02-23 RX ADMIN — Medication 400 MILLIGRAM(S): at 22:08

## 2020-02-23 RX ADMIN — Medication 650 MILLIGRAM(S): at 10:04

## 2020-02-23 RX ADMIN — Medication 100 MILLIGRAM(S): at 20:03

## 2020-02-23 RX ADMIN — PIPERACILLIN AND TAZOBACTAM 25 GRAM(S): 4; .5 INJECTION, POWDER, LYOPHILIZED, FOR SOLUTION INTRAVENOUS at 20:03

## 2020-02-23 RX ADMIN — Medication 0.1 MILLIGRAM(S): at 05:09

## 2020-02-23 RX ADMIN — AMLODIPINE BESYLATE 10 MILLIGRAM(S): 2.5 TABLET ORAL at 05:09

## 2020-02-23 RX ADMIN — CHLORHEXIDINE GLUCONATE 1 APPLICATION(S): 213 SOLUTION TOPICAL at 12:30

## 2020-02-23 RX ADMIN — CHLORHEXIDINE GLUCONATE 15 MILLILITER(S): 213 SOLUTION TOPICAL at 18:31

## 2020-02-23 RX ADMIN — Medication 650 MILLIGRAM(S): at 23:19

## 2020-02-23 RX ADMIN — Medication 100 MILLIGRAM(S): at 15:57

## 2020-02-23 RX ADMIN — Medication 0.2 MILLIGRAM(S): at 14:25

## 2020-02-23 RX ADMIN — Medication 166.67 MILLIGRAM(S): at 10:33

## 2020-02-23 RX ADMIN — LEVETIRACETAM 750 MILLIGRAM(S): 250 TABLET, FILM COATED ORAL at 05:09

## 2020-02-23 RX ADMIN — POTASSIUM PHOSPHATE, MONOBASIC POTASSIUM PHOSPHATE, DIBASIC 62.5 MILLIMOLE(S): 236; 224 INJECTION, SOLUTION INTRAVENOUS at 13:01

## 2020-02-23 RX ADMIN — Medication 300 MILLIGRAM(S): at 05:09

## 2020-02-23 RX ADMIN — FENTANYL CITRATE 25 MICROGRAM(S): 50 INJECTION INTRAVENOUS at 14:04

## 2020-02-23 NOTE — PROGRESS NOTE ADULT - SUBJECTIVE AND OBJECTIVE BOX
CHIEF COMPLAINT: Patient is a 40y old  Male who presents with a chief complaint of   Interval Events:    REVIEW OF SYSTEMS:  Constitutional:   Eyes:  ENT:  CV:  Resp:  GI:  :  MSK:  Integumentary:  Neurological:  Psychiatric:  Endocrine:  Hematologic/Lymphatic:  Allergic/Immunologic:  [ ] All other systems negative  [ ] Unable to assess ROS because ________    OBJECTIVE:  ICU Vital Signs Last 24 Hrs  T(C): 36.1 (23 Feb 2020 04:00), Max: 39 (22 Feb 2020 20:00)  T(F): 97 (23 Feb 2020 04:00), Max: 102.2 (22 Feb 2020 20:00)  HR: 77 (23 Feb 2020 06:00) (57 - 148)  BP: 150/112 (23 Feb 2020 06:00) (93/77 - 216/137)  BP(mean): 124 (23 Feb 2020 06:00) (84 - 179)  ABP: --  ABP(mean): --  RR: 20 (23 Feb 2020 06:00) (14 - 39)  SpO2: 100% (23 Feb 2020 06:00) (95% - 100%)        02-22 @ 07:01  -  02-23 @ 07:00  --------------------------------------------------------  IN: 2013 mL / OUT: 1655 mL / NET: 358 mL      CAPILLARY BLOOD GLUCOSE          PHYSICAL EXAM:  General:   HEENT:   Lymph Nodes:  Neck:   Respiratory:   Cardiovascular:   Abdomen:   Extremities:   Skin:   Neurological:  Psychiatry:    HOSPITAL MEDICATIONS:  MEDICATIONS  (STANDING):  amLODIPine   Tablet 10 milliGRAM(s) Oral daily  atorvastatin 10 milliGRAM(s) Oral at bedtime  chlorhexidine 4% Liquid 1 Application(s) Topical <User Schedule>  cloNIDine 0.1 milliGRAM(s) Oral every 8 hours  dexMEDEtomidine Infusion 0.5 MICROgram(s)/kG/Hr (8.425 mL/Hr) IV Continuous <Continuous>  famotidine    Tablet 20 milliGRAM(s) Oral daily  labetalol 300 milliGRAM(s) Oral every 8 hours  levETIRAcetam  Solution 750 milliGRAM(s) Oral two times a day  niCARdipine Infusion 5 mG/Hr (25 mL/Hr) IV Continuous <Continuous>  piperacillin/tazobactam IVPB.. 3.375 Gram(s) IV Intermittent every 8 hours  vancomycin  IVPB 1000 milliGRAM(s) IV Intermittent once    MEDICATIONS  (PRN):  acetaminophen    Suspension .. 650 milliGRAM(s) Enteral Tube every 6 hours PRN Temp greater or equal to 38C (100.4F)      LABS:  (02-23 @ 02:30)                        12.4  11.14 )-----------( 259                 36.4    Neutrophils = -- (--%)  Lymphocytes = -- (--%)  Eosinophils = -- (--%)  Basophils = -- (--%)  Monocytes = -- (--%)  Bands = --%    WBC Trend: 11.14<--, 13.56<--, 18.54<--  Hb Trend: 12.4<--, 12.0<--, 11.0<--, 10.8<--, 14.9<--  Plt Trend: 259<--, 189<--, 189<--, 176<--, 264<--  02-23    141  |  102  |  17  ----------------------------<  145<H>  2.5<LL>   |  23  |  1.22    Ca    9.5      23 Feb 2020 02:30  Phos  3.7     02-23  Mg     2.0     02-23    TPro  7.3  /  Alb  3.7  /  TBili  0.5  /  DBili  x   /  AST  11  /  ALT  14  /  AlkPhos  97  02-23    Creatinine Trend: 1.22<--, 0.98<--, 0.93<--, 1.07<--, 1.01<--, 1.02<--                MICROBIOLOGY:   Blood Cx:  Urine Cx:  Sputum Cx:  Legionella:  RVP:    RADIOLOGY:  X Ray:  CT:  MRI:  Ultrasound:  [ ] Reviewed and interpreted by me    EKG: CHIEF COMPLAINT: Patient is a 40y old  Male who presents with a chief complaint of     Interval Events: Patient seen     REVIEW OF SYSTEMS:  Constitutional:   Eyes:  ENT:  CV:  Resp:  GI:  :  MSK:  Integumentary:  Neurological:  Psychiatric:  Endocrine:  Hematologic/Lymphatic:  Allergic/Immunologic:  [ ] All other systems negative  [ ] Unable to assess ROS because ________    OBJECTIVE:  ICU Vital Signs Last 24 Hrs  T(C): 36.1 (23 Feb 2020 04:00), Max: 39 (22 Feb 2020 20:00)  T(F): 97 (23 Feb 2020 04:00), Max: 102.2 (22 Feb 2020 20:00)  HR: 77 (23 Feb 2020 06:00) (57 - 148)  BP: 150/112 (23 Feb 2020 06:00) (93/77 - 216/137)  BP(mean): 124 (23 Feb 2020 06:00) (84 - 179)  ABP: --  ABP(mean): --  RR: 20 (23 Feb 2020 06:00) (14 - 39)  SpO2: 100% (23 Feb 2020 06:00) (95% - 100%)        02-22 @ 07:01  -  02-23 @ 07:00  --------------------------------------------------------  IN: 2013 mL / OUT: 1655 mL / NET: 358 mL      CAPILLARY BLOOD GLUCOSE          PHYSICAL EXAM:  General:   HEENT:   Lymph Nodes:  Neck:   Respiratory:   Cardiovascular:   Abdomen:   Extremities:   Skin:   Neurological:  Psychiatry:    HOSPITAL MEDICATIONS:  MEDICATIONS  (STANDING):  amLODIPine   Tablet 10 milliGRAM(s) Oral daily  atorvastatin 10 milliGRAM(s) Oral at bedtime  chlorhexidine 4% Liquid 1 Application(s) Topical <User Schedule>  cloNIDine 0.1 milliGRAM(s) Oral every 8 hours  dexMEDEtomidine Infusion 0.5 MICROgram(s)/kG/Hr (8.425 mL/Hr) IV Continuous <Continuous>  famotidine    Tablet 20 milliGRAM(s) Oral daily  labetalol 300 milliGRAM(s) Oral every 8 hours  levETIRAcetam  Solution 750 milliGRAM(s) Oral two times a day  niCARdipine Infusion 5 mG/Hr (25 mL/Hr) IV Continuous <Continuous>  piperacillin/tazobactam IVPB.. 3.375 Gram(s) IV Intermittent every 8 hours  vancomycin  IVPB 1000 milliGRAM(s) IV Intermittent once    MEDICATIONS  (PRN):  acetaminophen    Suspension .. 650 milliGRAM(s) Enteral Tube every 6 hours PRN Temp greater or equal to 38C (100.4F)      LABS:  (02-23 @ 02:30)                        12.4  11.14 )-----------( 259                 36.4    Neutrophils = -- (--%)  Lymphocytes = -- (--%)  Eosinophils = -- (--%)  Basophils = -- (--%)  Monocytes = -- (--%)  Bands = --%    WBC Trend: 11.14<--, 13.56<--, 18.54<--  Hb Trend: 12.4<--, 12.0<--, 11.0<--, 10.8<--, 14.9<--  Plt Trend: 259<--, 189<--, 189<--, 176<--, 264<--  02-23    141  |  102  |  17  ----------------------------<  145<H>  2.5<LL>   |  23  |  1.22    Ca    9.5      23 Feb 2020 02:30  Phos  3.7     02-23  Mg     2.0     02-23    TPro  7.3  /  Alb  3.7  /  TBili  0.5  /  DBili  x   /  AST  11  /  ALT  14  /  AlkPhos  97  02-23    Creatinine Trend: 1.22<--, 0.98<--, 0.93<--, 1.07<--, 1.01<--, 1.02<--                MICROBIOLOGY:   Blood Cx:  Urine Cx:  Sputum Cx:  Legionella:  RVP:    RADIOLOGY:  X Ray:  CT:  MRI:  Ultrasound:  [ ] Reviewed and interpreted by me    EKG:

## 2020-02-23 NOTE — PHARMACOTHERAPY INTERVENTION NOTE - COMMENTS
LUIS EDUARDO FRANKEL is a 40y Male with MRSA bacteremia on vancomycin .    1. Recommend optimizing vancomycin dosing  - Vancomycin trough of 12.1 micrograms/mL (2/22/2020 at 3am) with 1000 mg q12h dosing  - Patient started on 1250 mg q12h (first dose 2/23/2020 at 10:33am)  - Recommend 1000 mg q8h for an estimated trough of 18 micrograms/mL and optimization of vancomycin's kinetics  - Time vancomycin for 6pm, 2am, and 10am (vancomycin last administered 2/23/2020 at 10:33am)    Philip Martinez, PharmD  PGY-1 Pharmacy Resident  [facility]

## 2020-02-23 NOTE — OCCUPATIONAL THERAPY INITIAL EVALUATION ADULT - MUSCLE TONE ASSESSMENT, REHAB EVAL
bilateral lower extremities/extensor tone BLE and flexor tone BUE/bilateral upper extremities/moderately increased tone

## 2020-02-23 NOTE — PROGRESS NOTE ADULT - ASSESSMENT
38 y/o M w/ Mhx HTN, T2DM, prior ETOH and cocaine abuse with history of recent R basal ganglia hemorrhagic stroke (with residual LUE and LLE weakness), L thalamic ischemic stroke, and R parietal density transferred from  where he presented with AMS/obtundation preceded by dizziness, HA, and worsening left sided weakness found to have ICH, intubated for airway protection w/ hospital course c/b LLE shaking presumed to be seizure requiring keppra and ativan. Patient was transferred to [facility] for neuro eval and EEG, now extubated. CT showing stable hemorrhage, currently undergoing workup for hemorrhagic stroke etiology and being managed for possible EtOH withdrawal given persistent tachycardia/HTN despite max doses of nicardipine.    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 with vasogenic edema  - 24h head CT showed stable hemorrhage  - elevated HOB 30 degrees   - keppra increased to 750mg bid per neuro recs  - Goal SBP <160  - labetalol increased to 300mg BID  - q1h neuro checks  - 3% saline d/c given 72 hours s/p stroke  - c/w video EEG  - no seizure seen on EEG, but noted to have potential epileptogenic focus in the right parietal region, cortical dysfunction and structural abnormality in the right hemisphere, and mild nonspecific diffuse or multifocal cerebral dysfunction  - MRI brain w, w/o when able  - TTE showed increased LV wall thickness, bubble study was unsuccessful due to poor visualization  - LE duplex negative for DVTs  - f/u hypercoagulable work up - results so far show decreased protein S activity, low factor II assay, high factor VIII assay  - neuro recs appreciated, will get heme consult for hypercoagulable w/u in a non-acute setting once pt. has been downgraded from the MICU    CARDIAC: h/o HTN  - goal SBP < 160  - holding cardine gtt, precedex dc/d  - labetalol increased to 300mg bid  - has been having tachy/HTN, managing with anti-hypertensives as needed  - Patient w/ htn/tachycardia initially thought due to alcohol withdrawal, refractory to phenobarb today. Will use ativan PRN to tx for cocaine withdrawal    RESPIRATORY  -on 2L NC  -c/w zosyn 2/20 Day 2 for possible aspiration PNA  -vanc dc/d    ID  -febrile o/n again with increasing leukocytosis  -elevated procal  -c/w zosyn for possible aspiration PNA  -vanc dc/d  -BCx negative at 24 hours  -fu resp culture/gram stain    GI  -on tube feeds    ENDO  -no acute issues  - ISS; A1C 4.9    RENAL  -d/c hypertonic saline given that it is 72 hrs s/p stroke/24 hrs s/p last stable ct scan    HEME/ONC:  -DVT PPX- SCDs

## 2020-02-23 NOTE — OCCUPATIONAL THERAPY INITIAL EVALUATION ADULT - RANGE OF MOTION EXAMINATION, UPPER EXTREMITY
bilateral UE Passive ROM was WFL  (within functional limits)/limited active movement t/o the RUE 1/3 of full AROM with increased tone noted and trace AROM on LUE

## 2020-02-23 NOTE — OCCUPATIONAL THERAPY INITIAL EVALUATION ADULT - ADDITIONAL COMMENTS
Pt required assist with all ADL. Pt was able feed self and perform simple grooming task using the right hand and able to assist with transfers prior to hospitalization per family.

## 2020-02-23 NOTE — OCCUPATIONAL THERAPY INITIAL EVALUATION ADULT - PERTINENT HX OF CURRENT PROBLEM, REHAB EVAL
40 year old male PMH of HTN ?ICH in 9/2019 (with residual LUE and LLE weakness) transferred from  where he presented with AMS and unresponsiveness. Per father, pt was his normal self (A&Ox3, talking normally, had LUE and LLE weakness that was at his baseline) had a headache and told his father he had severe dizziness, then pt could not move his LUE and LLE at all (worse than baseline), then lost consciousness. CT head acute parenchymal hemorrhage. US bilateral LEs negative DVT. Pt is a 40 year old male PMH of HTN ?ICH in 9/2019 (with residual LUE and LLE weakness) transferred from  where he presented with AMS and unresponsiveness. Per father, pt was his normal self (A&Ox3, talking normally, had LUE and LLE weakness that was at his baseline) had a headache and told his father he had severe dizziness, then pt could not move his LUE and LLE at all (worse than baseline), then lost consciousness. CT head acute parenchymal hemorrhage. US bilateral LEs negative DVT.

## 2020-02-23 NOTE — OCCUPATIONAL THERAPY INITIAL EVALUATION ADULT - PLANNED THERAPY INTERVENTIONS, OT EVAL
ADL retraining/bed mobility training/balance training/motor coordination training/parent/caregiver training.../cognitive, visual perceptual/fine motor coordination training/neuromuscular re-education/transfer training/strengthening/ROM

## 2020-02-23 NOTE — CONSULT NOTE ADULT - ASSESSMENT
39 y/o M PMHx DMII, reported polysubstance abuse ( alcohol and cocaine), previous ICH in september 2019 with residual left sided weakness was transferred from [facility] after episode of unresponsiveness leading to diagnosis of new hemorrhagic CVA, now with persistent fevers, leukocytosis with bandemia, and MRSA bacteremia.    MRSA Bacteremia  -Bcx 1 of 4 (aerobic) positive for GPC in clusters after 40 hours incubation, PCR showing MRSA. While PCR can read Mec A gene from CoNS as MRSA, given clinical picture of fevers and bandemia concern for MRSA infection.  -Unclear source, no current central line but could have been introduced through previous central line. Pt mentioned back pain for several months, could this be OM/discitis? Can consider spinal imaging.  -TTE 2/19 limited study but without evidence of endocarditis. Will discuss with attending need for JENNA  -No SSTI as possible source.  -Recommendations pending 41 y/o M PMHx DMII, reported polysubstance abuse ( alcohol and cocaine), previous ICH in september 2019 with residual left sided weakness was transferred from [facility] after episode of unresponsiveness leading to diagnosis of new hemorrhagic CVA, now with persistent fevers, leukocytosis with bandemia, and MRSA bacteremia.    MRSA Bacteremia  -Bcx 1 of 4 (aerobic) positive for GPC in clusters after 40 hours incubation, PCR showing MRSA.   -Unclear source, no current central line but could have been introduced through previous central line.  - Pt mentioned worsening back pain for several months, could this be OM/discitis? Would obtain spinal imaging. Pt mentioned upper back so consider CT Thoracic spine with contrast.  -TTE 2/19 limited study but without evidence of endocarditis. Would obtain repeat TTE early next week vs JENNA if persistently bacteremic  -No SSTI as possible source.  -c/w Vanco for MRSA pneumonia, can discontinue Zosyn as we have source of infection identified, no other clear source, no cough to suggest aspiration pneumonia    Discussed with ICU team 41 y/o M PMHx DMII, reported polysubstance abuse ( alcohol and cocaine), previous ICH in september 2019 with residual left sided weakness was transferred from [facility] after episode of unresponsiveness leading to diagnosis of new hemorrhagic CVA, now with persistent fevers, leukocytosis with bandemia, and MRSA bacteremia.    sepsis, fever, leucocytosis, tachycardia, due to MRSA Bacteremia, possible source phlebitis.     Plan:   -Bcx 1 of 4 (aerobic) positive for GPC in clusters after 40 hours incubation, PCR showing MRSA.   -Unclear source, no current central line but could have been introduced through area of phlebitis rt arm.   - Pt mentioned worsening back pain for several months, could this be OM/discitis? Would obtain spinal imaging. Pt mentioned upper back so consider CT Thoracic spine with contrast.  -TTE 2/19 limited study but without evidence of endocarditis. Would obtain repeat TTE early next week vs JENNA if persistently bacteremic  -No SSTI as possible source.  -c/w Vanco for MRSA pneumonia, can discontinue Zosyn as we have source of infection identified, no other clear source, no cough to suggest aspiration pneumonia  -monitor vancomycin trough and creatinine to avoid nephrotoxicity and ensure efficacy   -repeat blood cx in lab    Discussed with ICU team

## 2020-02-23 NOTE — OCCUPATIONAL THERAPY INITIAL EVALUATION ADULT - LUE MMT, REHAB EVAL
0-1/5
Risks/benefits discussed with patient or patient surrogate/Vaccine Information Sheet (VIS) provided-VIS date: 8/07/15

## 2020-02-23 NOTE — PHARMACOTHERAPY INTERVENTION NOTE - COMMENTS
LUIS EDUARDO FRANKEL is a 40y Male with MRSA bacteremia on vancomycin.    1. Recommend ID Consult  - As per hospital policy, all staph aureus bacteremia require an ID consult.    Thank you,  Philip Martinez, PharmD  PGY-1 Pharmacy Resident  [facility]

## 2020-02-23 NOTE — CONSULT NOTE ADULT - ATTENDING COMMENTS
Patient seen and examined with neurology team at bedside and above note reviewed and I agree with assessment and plan as outlined. Patient hx as noted and father of patient at bedside and provided all the hx. Patient admitted for change in mentation, lethargy and weakness. He was transferred from [place] after CT head revealed a left thalamic bleed with extension into ventricle.   He had had a stroke back in September of 2019 involving the right basal ganglia and it was though to be due to hypertension that was poorly controlled. He has since been compliant with his medications and there was denial of any drug use or abuse or other supplements.   No reported trauma and CTA of head and neck done at [place] reviewed and no abnormal findings.   Exam: patient is intubated and sedated and poor pupillary response and no withdrawal to pain and increased reflexes on left side with upgoing toe on left.   CT head shows the left thalamic bleed.     Plan: will repeat CT head this afternoon and then MRI brain when possible to assess for any underlying mass or structural lesion.          BP control and with CTA negative much less likely to be vasculitis or reversible vasoconstrictive syndrome          Send Urine Tox, serum for LYNSEY, dsDNA, ANCA and P ANCA and RF, ESR and CRP and blood cultures          24 HOUR EEG monitoring and continue keppra 500mg BID and seizure precautions           TTE with bubble study to look for a cardiac source of potential embolus and Lower extremity dopplers.           Continue MICU care and management and supportive care.
Akilah Lamas  Pager: [phone]. If no response or past 5 pm call [phone].

## 2020-02-23 NOTE — EEG REPORT - NS EEG TEXT BOX
[facility]   REPORT OF CONTINUOUS VIDEO EEG     [facility]: [address], [place] [address]  [facility]: [id number] [address]  [facility]: [address] [place] [address]    Patient Name: LUIS EDUARDO FRANKEL  Age and : 40y ([birth year])  MRN #: [id number]  Location: [place] [address]  Referring Physician: Cristiane Ernst    Start Time/Date: 08:00 on 20  End Time/Date: 17:20 on 20  Duration: [id number]    _____________________________________________________________  STUDY INFORMATION    EEG Recording Technique:  The patient underwent continuous Video-EEG monitoring, using Telemetry System hardware on the XLTek Digital System. EEG and video data were stored on a computer hard drive with important events saved in digital archive files. The material was reviewed by a physician (electroencephalographer / epileptologist) on a daily basis. Christopher and seizure detection algorithms were utilized and reviewed. An EEG Technician attended to the patient, and was available throughout daytime work hours.  The epilepsy center neurologist was available in person or on call 24-hours per day.    EEG Placement and Labeling of Electrodes:  The EEG was performed utilizing 20 channel referential EEG connections (coronal over temporal over parasagittal montage) using all standard 10-20 electrode placements with EKG, with additional electrodes placed in the inferior temporal region using the modified 10-10 montage electrode placements for elective admissions, or if deemed necessary. Recording was at a sampling rate of 256 samples per second per channel. Time synchronized digital video recording was done simultaneously with EEG recording. A low light infrared camera was used for low light recording.     _____________________________________________________________  HISTORY    Patient is a 40y old  Male who presents with a chief complaint of AMS.    PERTINENT MEDICATION:  levETIRAcetam  IVPB 750 milliGRAM(s) IV Intermittent every 12 hours  _____________________________________________________________  STUDY INTERPRETATION    Findings: The background was continuous, spontaneously variable and reactive. During wakefulness, the posterior dominant rhythm consisted of asymmetric, poorly-modulated 8 Hz activity, with amplitude to 30 uV, that was seen over the left hemisphere.     Background Slowing:  Diffuse delta with superimposed faster activities.    Focal Slowing:   Continuous polymorphic delta slowing in the right hemisphere.     Sleep Background:  Drowsiness was characterized by fragmentation, attenuation, and slowing of the background activity.    Sleep was characterized by the presence of asymmetric, rudimentary spindles and K-complexes seen over the left hemisphere only.    Other Non-Epileptiform Findings:  Attenuation of fast activity in the right hemisphere.  Breach effect in right hemisphere, max parietally, characterized by higher amplitude and sharply contoured waves.     Interictal Epileptiform Activity:   Occasional right parietal (P4) sharp wave discharges.     Events:  Clinical events: One push button event for unclear clinical reasons.     Activation Procedures:   Hyperventilation was not performed.    Photic stimulation was performed and did not elicit any abnormality.     Artifacts:  Intermittent myogenic and movement artifacts were noted.    ECG:  The heart rate on single channel ECG was predominantly between 60-70 BPM.    _____________________________________________________________  EEG SUMMARY/CLASSIFICATION    Abnormal EEG in an altered patient.  - Occasional right parietal (P4) sharp wave discharges.  - Continuous polymorphic delta slowing in the right hemisphere.   - Attenuation of fast activity in the right hemisphere.  - Mild to moderate generalized slowing.  - Breach effect in right hemisphere, max parietally, characterized by higher amplitude and sharply contoured waves.     _____________________________________________________________  EEG IMPRESSION/CLINICAL CORRELATE    Abnormal EEG study.  1. Potential epileptogenic focus in the right parietal region.   2. Cortical dysfunction and structural abnormality in the right hemisphere.  3. Mild to moderate nonspecific diffuse or multifocal cerebral dysfunction.   4. No seizure seen.  5. 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       Urbano Colvin MD  Attending Physician, [facility] [place]

## 2020-02-23 NOTE — CONSULT NOTE ADULT - SUBJECTIVE AND OBJECTIVE BOX
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 During hospitalization, concern for seizures, started on AEDs however EEG reportedly has been normal to date. Pt was transferred to [facility] on 2/18 with no fevers and normal WBC, however started spiking fever on 2/19 with worsening leukocytosis peaking at 18.54 with >6% bandemia. UA, RVP, CXR unremarkable for source of infection, however Bcx returned positive 1 of 4 bottles for GPC in clusters with PCR reading as MRSA.    Pt is now extubated, able to answer some questions in Greenlandic. States he has some mild upper back pain for several months prior to recent illness. Denies fever, chills, cough, diarrhea. No metal or prosthesis.    Pt has been treated with Vancomycin and Zosyn for presumed aspiration pneumonia.     prior hospital charts reviewed [ x ]  primary team notes reviewed [x  ]  other consultant notes reviewed [x  ]  PAST MEDICAL & SURGICAL HISTORY:  CVA (cerebrovascular accident)  HTN (hypertension)  H/O craniotomy    Allergies  No Known Allergies    ANTIMICROBIALS (past 90 days)  MEDICATIONS  (STANDING):  piperacillin/tazobactam IVPB.   200 mL/Hr IV Intermittent (02-20-20 @ 11:52)    piperacillin/tazobactam IVPB..   25 mL/Hr IV Intermittent (02-23-20 @ 12:19)   25 mL/Hr IV Intermittent (02-23-20 @ 03:02)   25 mL/Hr IV Intermittent (02-22-20 @ 20:52)   25 mL/Hr IV Intermittent (02-22-20 @ 12:12)   25 mL/Hr IV Intermittent (02-22-20 @ 03:54)   25 mL/Hr IV Intermittent (02-21-20 @ 19:32)   25 mL/Hr IV Intermittent (02-21-20 @ 12:00)   25 mL/Hr IV Intermittent (02-21-20 @ 04:25)   25 mL/Hr IV Intermittent (02-20-20 @ 19:38)    vancomycin  IVPB   250 mL/Hr IV Intermittent (02-22-20 @ 05:11)   250 mL/Hr IV Intermittent (02-21-20 @ 17:00)   250 mL/Hr IV Intermittent (02-21-20 @ 06:06)   250 mL/Hr IV Intermittent (02-20-20 @ 17:17)    vancomycin  IVPB   250 mL/Hr IV Intermittent (02-22-20 @ 21:59)    vancomycin  IVPB   166.67 mL/Hr IV Intermittent (02-23-20 @ 10:33)      ANTIMICROBIALS:    piperacillin/tazobactam IVPB.. 3.375 every 8 hours  vancomycin  IVPB 1000 <User Schedule>    OTHER MEDS: MEDICATIONS  (STANDING):  acetaminophen    Suspension .. 650 every 6 hours  amLODIPine   Tablet 10 daily  atorvastatin 10 at bedtime  cloNIDine 0.2 every 8 hours  dexMEDEtomidine Infusion 0.5 <Continuous>  famotidine    Tablet 20 daily  labetalol 400 every 8 hours  labetalol 100 once  levETIRAcetam  Solution 1000 two times a day    SOCIAL HISTORY:   reported history of alcohol abuse and cocaine use, difficult to obtain given patient's current state    FAMILY HISTORY: unable to obtain at this time    REVIEW OF SYSTEMS  [  ] ROS unobtainable because:    [x  ] All other systems negative except as noted below:	    Constitutional:  [ ] fever [ ] chills  [ ] weight loss  [ ] weakness  Skin:  [ ] rash [ ] phlebitis	  Eyes: [ ] icterus [ ] pain  [ ] discharge	  ENMT: [ ] sore throat  [ ] thrush [ ] ulcers [ ] exudates  Respiratory: [ ] dyspnea [ ] hemoptysis [ ] cough [ ] sputum	  Cardiovascular:  [ ] chest pain [ ] palpitations [ ] edema	  Gastrointestinal:  [ ] nausea [ ] vomiting [ ] diarrhea [ ] constipation [ ] pain	  Genitourinary:  [ ] dysuria [ ] frequency [ ] hematuria [ ] discharge [ ] flank pain  [ ] incontinence  Musculoskeletal:  [ ] myalgias [ ] arthralgias [ ] arthritis  [ ] back pain  Neurological:  [ ] headache [ ] seizures  [ ] confusion/altered mental status  Psychiatric:  [ ] anxiety [ ] depression	  Hematology/Lymphatics:  [ ] lymphadenopathy  Endocrine:  [ ] adrenal [ ] thyroid  Allergic/Immunologic:	 [ ] transplant [ ] seasonal    Vital Signs Last 24 Hrs  T(F): 98.2 (02-23-20 @ 12:00), Max: 102.2 (02-22-20 @ 20:00)  Vital Signs Last 24 Hrs  HR: 116 (02-23-20 @ 14:00) (62 - 148)  BP: 157/116 (02-23-20 @ 14:00) (93/77 - 186/132)  RR: 26 (02-23-20 @ 14:00)  SpO2: 99% (02-23-20 @ 14:00) (96% - 100%)  Wt(kg): --    PHYSICAL EXAM:  Constitutional: facial droop, slurred speech  HEAD/EYES: anicteric, no conjunctival injection  ENT:  supple, no thrush  Cardiovascular:   normal S1, S2, no murmur, no edema  Respiratory:  clear BS bilaterally, no wheezes, no rales  GI:  soft, non-tender, normal bowel sounds  :  no duke, no CVA tenderness  Musculoskeletal:  no synovitis, normal ROM  Skin:  no rash, no erythema, no phlebitis  Heme/Onc: no lymphadenopathy   Vascular: No central lines                            12.4   11.14 )-----------( 259      ( 23 Feb 2020 02:30 )             36.4     02-23    143  |  109<H>  |  6<L>  ----------------------------<  178<H>  4.0   |  21<L>  |  1.19    Ca    9.6      23 Feb 2020 10:50  Phos  2.4     02-23  Mg     2.0     02-23    TPro  7.3  /  Alb  3.7  /  TBili  0.5  /  DBili  x   /  AST  11  /  ALT  14  /  AlkPhos  97  02-23    MICROBIOLOGY:Vancomycin Level, Trough: 12.1 (02-22 @ 03:00)    Culture - Blood (collected 21 Feb 2020 03:49)  Source: BLOOD VENOUS  Preliminary Report (23 Feb 2020 07:56):    Insufficient growth, culture re-incubated.  Preliminary Report (22 Feb 2020 21:50):    ***Blood Panel PCR results on this specimen are available    approximately 3 hours after the Gram stain result***    Gram stain, PCR, and/or culture results may not always    correspond due to difference in methodologies    ------------------------------------------------------------    This PCR assay was performed using [employer].  The    following targets are tested for:  Enterococcus, vancomycin    resistant enterococci, Listeria monocytogenes,  coagulase    negative staphylococci, S. aureus, methicillin resistant S.    aureus, Streptococcus agalactiae (Group B), S. pneumoniae,    S. pyogenes (Group A), Acinetobacter baumannii, Enterobacter    cloacae, E. coli, Klebsiella oxytoca, K. pneumoniae, Proteus    sp., Serratia marcescens, Haemophilus influenzae, Neisseria    meningitidis, Pseudomonas aeruginosa, Candida albicans, C.    glabrata, C. krusei, C. parapsilosis, C. tropicalis and the    KPC resistance gene.    **NOTE: Due to technical problems, Proteus sp. will NOT be    reported as part of the BCID paneluntil further notice.  Organism: BLOOD CULTURE PCR  ***Blood Panel PCR results on this specimen are available  approximately 3 hours after the Gram stain result***  Gram stain, PCR, and/or culture results may not always  correspond due to difference in methodologies  ------------------------------------------------------------  This PCR assay was performed using [employer].  The  following targets are tested for:  Enterococcus, vancomycin  resistant enterococci, Listeria monocytogenes,  coagulase  negative staphylococci, S. aureus, methicillin resistant S.  aureus, Streptococcus agalactiae (Group B), S. pneumoniae,  S. pyogenes (Group A), Acinetobacter baumannii, Enterobacter  cloacae, E. coli, Klebsiella oxytoca, K. pneumoniae, Proteus  sp., Serratia marcescens, Haemophilus influenzae, Neisseria  meningitidis, Pseudomonas aeruginosa, Candida albicans, C.  glabrata, C. krusei, C. parapsilosis, C. tropicalis and the  KPC resistance gene.  **NOTE: Due to technical problems, Proteus sp. will NOT be  reported as part of the BCID panel until further notice. (23 Feb 2020 07:56)      -  Methicillin resistant Staphylococcus aureus (MRSA): + DETECT IOANA      Method Type: PCR  Organism: BLOOD CULTURE PCR (22 Feb 2020 21:50)    Culture - Blood (collected 21 Feb 2020 03:49)  Source: BLOOD PERIPHERAL  Preliminary Report (23 Feb 2020 03:48):    NO ORGANISMS ISOLATED    NO ORGANISMS ISOLATED AT 48 HRS.                  RADIOLOGY:  < from: CT Head No Cont (02.22.20 @ 17:37) >      IMPRESSION: No change since 2/19/2020. 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 Old right basal ganglia infarct with vacuo dilatation of the right lateral ventricle.            < from: Xray Chest 1 View- PORTABLE-Urgent (02.22.20 @ 12:27) >    Cardiac silhouette normal in size. Lungs are clear. No pleural effusion or pneumothorax. Dilated upper abdominal bowel loops. Enteric tube tip below diaphragm. Patient is a 40y old  Male who presents with a chief complaint of   HPI:  41 y/o M PMHx DMII, reported polysubstance abuse ( alcohol and cocaine), previous ICH in september 2019 with residual left sided weakness was transferred from [facility] after episode of unresponsiveness leading to diagnosis of new hemorrhagic CVA. During hospitalization, concern for seizures, started on AEDs however EEG reportedly has been normal to date. Pt was transferred to [facility] on 2/18 with no fevers and normal WBC, however started spiking fever on 2/19 with worsening leukocytosis peaking at 18.54 with >6% bandemia. UA, RVP, CXR unremarkable for source of infection, however Bcx returned positive 1 of 4 bottles for GPC in clusters with PCR reading as MRSA.    Pt is now extubated, able to answer some questions in [place]iny[place]. States he has some mild upper back pain for 2 months prior to recent illness, has gotten worse recently. Denies fever, chills, cough, diarrhea. No metal or prosthesis. No headaches, no neck pain.     Pt has been treated with Vancomycin and Zosyn for presumed aspiration pneumonia.     prior hospital charts reviewed [ x ]  primary team notes reviewed [x  ]  other consultant notes reviewed [x  ]      PAST MEDICAL & SURGICAL HISTORY:  CVA (cerebrovascular accident)  HTN (hypertension)  H/O craniotomy    Allergies  No Known Allergies    ANTIMICROBIALS (past 90 days)  MEDICATIONS  (STANDING):  piperacillin/tazobactam IVPB.   200 mL/Hr IV Intermittent (02-20-20 @ 11:52)    piperacillin/tazobactam IVPB..   25 mL/Hr IV Intermittent (02-23-20 @ 12:19)    vancomycin  IVPB   250 mL/Hr IV Intermittent (02-22-20 @ 05:11)   250 mL/Hr IV Intermittent (02-21-20 @ 17:00)      vancomycin  IVPB   250 mL/Hr IV Intermittent (02-22-20 @ 21:59)    vancomycin  IVPB   166.67 mL/Hr IV Intermittent (02-23-20 @ 10:33)      ANTIMICROBIALS:    piperacillin/tazobactam IVPB.. 3.375 every 8 hours  vancomycin  IVPB 1000 <User Schedule>    OTHER MEDS: MEDICATIONS  (STANDING):  acetaminophen    Suspension .. 650 every 6 hours  amLODIPine   Tablet 10 daily  atorvastatin 10 at bedtime  cloNIDine 0.2 every 8 hours  dexMEDEtomidine Infusion 0.5 <Continuous>  famotidine    Tablet 20 daily  labetalol 400 every 8 hours  labetalol 100 once  levETIRAcetam  Solution 1000 two times a day      SOCIAL HISTORY:   reported history of alcohol abuse and cocaine use, difficult to obtain given patient's current state      FAMILY HISTORY: unable to obtain at this time      REVIEW OF SYSTEMS  [  ] ROS unobtainable because:    [x  ] All other systems negative except as noted below:	    Constitutional:  [ x] fever [ ] chills  [ ] weight loss  [ ] weakness  Skin:  [ ] rash [ ] phlebitis	  Eyes: [ ] icterus [ ] pain  [ ] discharge	  ENMT: [ ] sore throat  [ ] thrush [ ] ulcers [ ] exudates  Respiratory: [ ] dyspnea [ ] hemoptysis [ ] cough [ ] sputum	  Cardiovascular:  [ ] chest pain [ ] palpitations [ ] edema	  Gastrointestinal:  [ ] nausea [ ] vomiting [ ] diarrhea [ ] constipation [ ] pain	  Genitourinary:  [ ] dysuria [ ] frequency [ ] hematuria [ ] discharge [ ] flank pain  [ ] incontinence  Musculoskeletal:  [ ] myalgias [ ] arthralgias [ ] arthritis  [x ] back pain  Neurological:  [ ] headache [ ] seizures  [ ] confusion/altered mental status	  Endocrine:  [ ] adrenal [ ] thyroid  Allergic/Immunologic:	 [ ] transplant [ ] seasonal      Vital Signs Last 24 Hrs  T(F): 98.2 (02-23-20 @ 12:00), Max: 102.2 (02-22-20 @ 20:00)  Vital Signs Last 24 Hrs  HR: 116 (02-23-20 @ 14:00) (62 - 148)  BP: 157/116 (02-23-20 @ 14:00) (93/77 - 186/132)  RR: 26 (02-23-20 @ 14:00)  SpO2: 99% (02-23-20 @ 14:00) (96% - 100%)  Wt(kg): --      PHYSICAL EXAM:  Constitutional: facial droop, slurred speech, hooked to EEG.   HEAD/EYES: anicteric, no conjunctival injection  ENT:  supple, no thrush  Cardiovascular:   normal S1, S2, tachy  Respiratory: + air entry b/l   GI:  soft, non-tender, normal bowel sounds  :  [place] catheter,   Musculoskeletal:  no joint swelling,   Skin:  no rash, no erythema, possible phlebitis rt arm   Vascular: No central lines  Extremities: no edema                           12.4   11.14 )-----------( 259      ( 23 Feb 2020 02:30 )             36.4     02-23    143  |  109<H>  |  6<L>  ----------------------------<  178<H>  4.0   |  21<L>  |  1.19    Ca    9.6      23 Feb 2020 10:50  Phos  2.4     02-23  Mg     2.0     02-23    TPro  7.3  /  Alb  3.7  /  TBili  0.5  /  DBili  x   /  AST  11  /  ALT  14  /  AlkPhos  97  02-23    MICROBIOLOGY:Vancomycin Level, Trough: 12.1 (02-22 @ 03:00)    Culture - Blood (collected 21 Feb 2020 03:49)  Source: BLOOD VENOUS  Preliminary Report (23 Feb 2020 07:56):    Insufficient growth, culture re-incubated.  Preliminary Report (22 Feb 2020 21:50):    ***Blood Panel PCR results on this specimen are available    approximately 3 hours after the Gram stain result***    Gram stain, PCR, and/or culture results may not always    correspond due to difference in methodologies    ------------------------------------------------------------    This PCR assay was performed using [employer].  The    following targets are tested for:  Enterococcus, vancomycin    resistant enterococci, Listeria monocytogenes,  coagulase    negative staphylococci, S. aureus, methicillin resistant S.    aureus, Streptococcus agalactiae (Group B), S. pneumoniae,    S. pyogenes (Group A), Acinetobacter baumannii, Enterobacter    cloacae, E. coli, Klebsiella oxytoca, K. pneumoniae, Proteus    sp., Serratia marcescens, Haemophilus influenzae, Neisseria    meningitidis, Pseudomonas aeruginosa, Candida albicans, C.    glabrata, C. krusei, C. parapsilosis, C. tropicalis and the    KPC resistance gene.    **NOTE: Due to technical problems, Proteus sp. will NOT be    reported as part of the BCID paneluntil further notice.  Organism: BLOOD CULTURE PCR  ***Blood Panel PCR results on this specimen are available  approximately 3 hours after the Gram stain result***  Gram stain, PCR, and/or culture results may not always  correspond due to difference in methodologies  ------------------------------------------------------------  This PCR assay was performed using [employer].  The  following targets are tested for:  Enterococcus, vancomycin  resistant enterococci, Listeria monocytogenes,  coagulase  negative staphylococci, S. aureus, methicillin resistant S.  aureus, Streptococcus agalactiae (Group B), S. pneumoniae,  S. pyogenes (Group A), Acinetobacter baumannii, Enterobacter  cloacae, E. coli, Klebsiella oxytoca, K. pneumoniae, Proteus  sp., Serratia marcescens, Haemophilus influenzae, Neisseria  meningitidis, Pseudomonas aeruginosa, Candida albicans, C.  glabrata, C. krusei, C. parapsilosis, C. tropicalis and the  KPC resistance gene.  **NOTE: Due to technical problems, Proteus sp. will NOT be  reported as part of the BCID panel until further notice. (23 Feb 2020 07:56)      -  Methicillin resistant Staphylococcus aureus (MRSA): + DETECT IOANA      Method Type: PCR  Organism: BLOOD CULTURE PCR (22 Feb 2020 21:50)    Culture - Blood (collected 21 Feb 2020 03:49)  Source: BLOOD PERIPHERAL  Preliminary Report (23 Feb 2020 03:48):    NO ORGANISMS ISOLATED    NO ORGANISMS ISOLATED AT 48 HRS.        RADIOLOGY:  < from: CT Head No Cont (02.22.20 @ 17:37) >      IMPRESSION: No change since 2/19/2020. 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 Old right basal ganglia infarct with vacuo dilatation of the right lateral ventricle.      < from: Xray Chest 1 View- PORTABLE-Urgent (02.22.20 @ 12:27) >    Cardiac silhouette normal in size. Lungs are clear. No pleural effusion or pneumothorax. Dilated upper abdominal bowel loops. Enteric tube tip below diaphragm.

## 2020-02-23 NOTE — PROGRESS NOTE ADULT - ATTENDING COMMENTS
I personally interviewed and reviewed the plan of therapy with the resident at bedside.    Red remains on continuous EEG with, as noted in the report, possible epileptic foci in parietal region. Despite this he is able to make some needs known (i.e. asking for water). has some spontaneous movement on his right, no motion on his left spontaneously, though at times has tremor lower > upper extremity on right without loss of consciousness. His nicardipine infusion was stopped overnight, however dexmedetomidine was increased- I feel we are using one to reduce the other and vice versa. His HTN control is improved, though we can maximize it more and increase labetalol and clonidine as well as adding ARB (keeping close watch on his creatinine). he had fevers yesterday, none recently, MRSA in 1 of 2 cultures after 40 hours of growth- we'll continue vancomycin at 15mg/kg dose- pipercillin/tazobactam for possible aspiration event, though I'd use this for only 5 days has he has no sputum, clear lungs, and no supplemental oxygen requirements. if he his repeat blood cultures are positive he'd require repeat TTE and possible JENNA (though he may require this for close bubble study eval in setting of recurrent strokes). Regarding the possible seizure foci, we'll increase levetiracetam to 1000mg BID. family updated at bedside by team during the day. I personally interviewed and reviewed the plan of therapy with the resident at bedside.    Red remains on continuous EEG with, as noted in the report, possible epileptic foci in parietal region. Despite this he is able to make some needs known (i.e. asking for water). has some spontaneous movement on his right, no motion on his left spontaneously, though at times has tremor lower > upper extremity on right without loss of consciousness. His nicardipine infusion was stopped overnight, however dexmedetomidine was increased- I feel we are using one to reduce the other and vice versa. His HTN control is improved, though we can maximize it more and increase labetalol and clonidine as well as adding ARB (keeping close watch on his creatinine). he had fevers yesterday, none recently, MRSA in 1 of 2 cultures after 40 hours of growth- we'll continue vancomycin at 15mg/kg dose- pipercillin/tazobactam for possible aspiration event, though I'd use this for only 5 days has he has no sputum, clear lungs, and no supplemental oxygen requirements. if he his repeat blood cultures are positive he'd require repeat TTE and possible JENNA (though he may require this for close bubble study eval in setting of recurrent strokes). Regarding the possible seizure foci, we'll increase levetiracetam to 1000mg BID. We added standing acetaminophen for generalized discomfort- being bed bound. family updated at bedside by team during the day.

## 2020-02-24 LAB
ALBUMIN SERPL ELPH-MCNC: 3.8 G/DL — SIGNIFICANT CHANGE UP (ref 3.3–5)
ALP SERPL-CCNC: 96 U/L — SIGNIFICANT CHANGE UP (ref 40–120)
ALT FLD-CCNC: 16 U/L — SIGNIFICANT CHANGE UP (ref 4–41)
ANION GAP SERPL CALC-SCNC: 14 MMO/L — SIGNIFICANT CHANGE UP (ref 7–14)
AST SERPL-CCNC: 12 U/L — SIGNIFICANT CHANGE UP (ref 4–40)
BASOPHILS # BLD AUTO: 0.04 K/UL — SIGNIFICANT CHANGE UP (ref 0–0.2)
BASOPHILS NFR BLD AUTO: 0.5 % — SIGNIFICANT CHANGE UP (ref 0–2)
BILIRUB SERPL-MCNC: 0.3 MG/DL — SIGNIFICANT CHANGE UP (ref 0.2–1.2)
BUN SERPL-MCNC: 13 MG/DL — SIGNIFICANT CHANGE UP (ref 7–23)
CALCIUM SERPL-MCNC: 9.5 MG/DL — SIGNIFICANT CHANGE UP (ref 8.4–10.5)
CHLORIDE SERPL-SCNC: 104 MMOL/L — SIGNIFICANT CHANGE UP (ref 98–107)
CO2 SERPL-SCNC: 21 MMOL/L — LOW (ref 22–31)
CREAT SERPL-MCNC: 1.07 MG/DL — SIGNIFICANT CHANGE UP (ref 0.5–1.3)
EOSINOPHIL # BLD AUTO: 0.21 K/UL — SIGNIFICANT CHANGE UP (ref 0–0.5)
EOSINOPHIL NFR BLD AUTO: 2.4 % — SIGNIFICANT CHANGE UP (ref 0–6)
GLUCOSE SERPL-MCNC: 149 MG/DL — HIGH (ref 70–99)
HCT VFR BLD CALC: 34.9 % — LOW (ref 39–50)
HGB BLD-MCNC: 12.2 G/DL — LOW (ref 13–17)
IMM GRANULOCYTES NFR BLD AUTO: 0.6 % — SIGNIFICANT CHANGE UP (ref 0–1.5)
LYMPHOCYTES # BLD AUTO: 1.46 K/UL — SIGNIFICANT CHANGE UP (ref 1–3.3)
LYMPHOCYTES # BLD AUTO: 16.5 % — SIGNIFICANT CHANGE UP (ref 13–44)
MAGNESIUM SERPL-MCNC: 1.9 MG/DL — SIGNIFICANT CHANGE UP (ref 1.6–2.6)
MCHC RBC-ENTMCNC: 29.7 PG — SIGNIFICANT CHANGE UP (ref 27–34)
MCHC RBC-ENTMCNC: 35 % — SIGNIFICANT CHANGE UP (ref 32–36)
MCV RBC AUTO: 84.9 FL — SIGNIFICANT CHANGE UP (ref 80–100)
MONOCYTES # BLD AUTO: 0.54 K/UL — SIGNIFICANT CHANGE UP (ref 0–0.9)
MONOCYTES NFR BLD AUTO: 6.1 % — SIGNIFICANT CHANGE UP (ref 2–14)
NEUTROPHILS # BLD AUTO: 6.53 K/UL — SIGNIFICANT CHANGE UP (ref 1.8–7.4)
NEUTROPHILS NFR BLD AUTO: 73.9 % — SIGNIFICANT CHANGE UP (ref 43–77)
NRBC # FLD: 0 K/UL — SIGNIFICANT CHANGE UP (ref 0–0)
ORGANISM # SPEC MICROSCOPIC CNT: SIGNIFICANT CHANGE UP
PHOSPHATE SERPL-MCNC: 2.6 MG/DL — SIGNIFICANT CHANGE UP (ref 2.5–4.5)
PLATELET # BLD AUTO: 274 K/UL — SIGNIFICANT CHANGE UP (ref 150–400)
PMV BLD: 10.1 FL — SIGNIFICANT CHANGE UP (ref 7–13)
POTASSIUM SERPL-MCNC: 3.2 MMOL/L — LOW (ref 3.5–5.3)
POTASSIUM SERPL-SCNC: 3.2 MMOL/L — LOW (ref 3.5–5.3)
PROT SERPL-MCNC: 7.3 G/DL — SIGNIFICANT CHANGE UP (ref 6–8.3)
RBC # BLD: 4.11 M/UL — LOW (ref 4.2–5.8)
RBC # FLD: 12.9 % — SIGNIFICANT CHANGE UP (ref 10.3–14.5)
SODIUM SERPL-SCNC: 139 MMOL/L — SIGNIFICANT CHANGE UP (ref 135–145)
SPECIMEN SOURCE: SIGNIFICANT CHANGE UP
WBC # BLD: 8.83 K/UL — SIGNIFICANT CHANGE UP (ref 3.8–10.5)
WBC # FLD AUTO: 8.83 K/UL — SIGNIFICANT CHANGE UP (ref 3.8–10.5)

## 2020-02-24 PROCEDURE — 95718 EEG PHYS/QHP 2-12 HR W/VEEG: CPT

## 2020-02-24 PROCEDURE — 93306 TTE W/DOPPLER COMPLETE: CPT | Mod: 26

## 2020-02-24 PROCEDURE — 99233 SBSQ HOSP IP/OBS HIGH 50: CPT

## 2020-02-24 RX ORDER — OLANZAPINE 15 MG/1
5 TABLET, FILM COATED ORAL AT BEDTIME
Refills: 0 | Status: DISCONTINUED | OUTPATIENT
Start: 2020-02-24 | End: 2020-02-25

## 2020-02-24 RX ORDER — HYDRALAZINE HCL 50 MG
10 TABLET ORAL ONCE
Refills: 0 | Status: COMPLETED | OUTPATIENT
Start: 2020-02-24 | End: 2020-02-24

## 2020-02-24 RX ORDER — HYDRALAZINE HCL 50 MG
10 TABLET ORAL ONCE
Refills: 0 | Status: DISCONTINUED | OUTPATIENT
Start: 2020-02-24 | End: 2020-03-09

## 2020-02-24 RX ORDER — DIPHENHYDRAMINE HCL 50 MG
25 CAPSULE ORAL ONCE
Refills: 0 | Status: COMPLETED | OUTPATIENT
Start: 2020-02-24 | End: 2020-02-24

## 2020-02-24 RX ORDER — POTASSIUM CHLORIDE 20 MEQ
40 PACKET (EA) ORAL
Refills: 0 | Status: COMPLETED | OUTPATIENT
Start: 2020-02-24 | End: 2020-02-24

## 2020-02-24 RX ORDER — HEPARIN SODIUM 5000 [USP'U]/ML
5000 INJECTION INTRAVENOUS; SUBCUTANEOUS EVERY 8 HOURS
Refills: 0 | Status: DISCONTINUED | OUTPATIENT
Start: 2020-02-24 | End: 2020-03-01

## 2020-02-24 RX ADMIN — ATORVASTATIN CALCIUM 10 MILLIGRAM(S): 80 TABLET, FILM COATED ORAL at 22:28

## 2020-02-24 RX ADMIN — Medication 1 PATCH: at 19:15

## 2020-02-24 RX ADMIN — Medication 10 MILLIGRAM(S): at 04:37

## 2020-02-24 RX ADMIN — CHLORHEXIDINE GLUCONATE 1 APPLICATION(S): 213 SOLUTION TOPICAL at 11:40

## 2020-02-24 RX ADMIN — Medication 650 MILLIGRAM(S): at 22:30

## 2020-02-24 RX ADMIN — LEVETIRACETAM 1000 MILLIGRAM(S): 250 TABLET, FILM COATED ORAL at 05:16

## 2020-02-24 RX ADMIN — Medication 1 PATCH: at 11:40

## 2020-02-24 RX ADMIN — Medication 650 MILLIGRAM(S): at 22:28

## 2020-02-24 RX ADMIN — AMLODIPINE BESYLATE 10 MILLIGRAM(S): 2.5 TABLET ORAL at 05:17

## 2020-02-24 RX ADMIN — CHLORHEXIDINE GLUCONATE 15 MILLILITER(S): 213 SOLUTION TOPICAL at 05:17

## 2020-02-24 RX ADMIN — Medication 250 MILLIGRAM(S): at 02:58

## 2020-02-24 RX ADMIN — Medication 650 MILLIGRAM(S): at 16:26

## 2020-02-24 RX ADMIN — Medication 400 MILLIGRAM(S): at 05:16

## 2020-02-24 RX ADMIN — Medication 650 MILLIGRAM(S): at 10:51

## 2020-02-24 RX ADMIN — Medication 650 MILLIGRAM(S): at 11:21

## 2020-02-24 RX ADMIN — Medication 650 MILLIGRAM(S): at 17:00

## 2020-02-24 RX ADMIN — PIPERACILLIN AND TAZOBACTAM 25 GRAM(S): 4; .5 INJECTION, POWDER, LYOPHILIZED, FOR SOLUTION INTRAVENOUS at 04:18

## 2020-02-24 RX ADMIN — Medication 100 MILLIGRAM(S): at 22:28

## 2020-02-24 RX ADMIN — Medication 40 MILLIEQUIVALENT(S): at 03:45

## 2020-02-24 RX ADMIN — LEVETIRACETAM 1000 MILLIGRAM(S): 250 TABLET, FILM COATED ORAL at 17:39

## 2020-02-24 RX ADMIN — Medication 400 MILLIGRAM(S): at 22:28

## 2020-02-24 RX ADMIN — Medication 40 MILLIEQUIVALENT(S): at 05:17

## 2020-02-24 RX ADMIN — PIPERACILLIN AND TAZOBACTAM 25 GRAM(S): 4; .5 INJECTION, POWDER, LYOPHILIZED, FOR SOLUTION INTRAVENOUS at 11:52

## 2020-02-24 RX ADMIN — HEPARIN SODIUM 5000 UNIT(S): 5000 INJECTION INTRAVENOUS; SUBCUTANEOUS at 13:03

## 2020-02-24 RX ADMIN — FAMOTIDINE 20 MILLIGRAM(S): 10 INJECTION INTRAVENOUS at 11:41

## 2020-02-24 RX ADMIN — Medication 650 MILLIGRAM(S): at 04:17

## 2020-02-24 RX ADMIN — Medication 100 MILLIGRAM(S): at 05:16

## 2020-02-24 RX ADMIN — Medication 100 MILLIGRAM(S): at 13:03

## 2020-02-24 RX ADMIN — Medication 400 MILLIGRAM(S): at 13:03

## 2020-02-24 RX ADMIN — Medication 250 MILLIGRAM(S): at 17:39

## 2020-02-24 RX ADMIN — HEPARIN SODIUM 5000 UNIT(S): 5000 INJECTION INTRAVENOUS; SUBCUTANEOUS at 22:30

## 2020-02-24 RX ADMIN — OLANZAPINE 5 MILLIGRAM(S): 15 TABLET, FILM COATED ORAL at 22:29

## 2020-02-24 RX ADMIN — Medication 250 MILLIGRAM(S): at 10:51

## 2020-02-24 RX ADMIN — Medication 50 MILLIGRAM(S): at 11:41

## 2020-02-24 RX ADMIN — Medication 650 MILLIGRAM(S): at 05:16

## 2020-02-24 RX ADMIN — CHLORHEXIDINE GLUCONATE 15 MILLILITER(S): 213 SOLUTION TOPICAL at 17:39

## 2020-02-24 RX ADMIN — Medication 25 MILLIGRAM(S): at 02:58

## 2020-02-24 NOTE — PROGRESS NOTE ADULT - SUBJECTIVE AND OBJECTIVE BOX
INTERVAL HPI/OVERNIGHT EVENTS:    SUBJECTIVE: Patient seen and examined at bedside. Unable to obtain.     OBJECTIVE:    VITAL SIGNS:  ICU Vital Signs Last 24 Hrs  T(C): 37.6 (24 Feb 2020 04:00), Max: 38.3 (23 Feb 2020 16:00)  T(F): 99.6 (24 Feb 2020 04:00), Max: 100.9 (23 Feb 2020 16:00)  HR: 102 (24 Feb 2020 07:00) (64 - 129)  BP: 149/100 (24 Feb 2020 07:00) (114/75 - 179/115)  BP(mean): 113 (24 Feb 2020 07:00) (85 - 140)  ABP: --  ABP(mean): --  RR: 32 (24 Feb 2020 07:00) (9 - 34)  SpO2: 96% (24 Feb 2020 07:00) (86% - 100%)        02-23 @ 07:01  -  02-24 @ 07:00  --------------------------------------------------------  IN: 3352 mL / OUT: 1480 mL / NET: 1872 mL      CAPILLARY BLOOD GLUCOSE      PHYSICAL EXAM:    General: NAD  HEENT: PERRL, clear conjunctiva  Neck: supple  Respiratory: CTA b/l  Cardiovascular: +S1/S2; RRR  Abdomen: soft, NT/ND; +BS x4  Extremities: WWP, 2+ peripheral pulses b/l; no LE edema  Skin: normal color and turgor; no rash  Neurological:    MEDICATIONS:  MEDICATIONS  (STANDING):  acetaminophen    Suspension .. 650 milliGRAM(s) Oral every 6 hours  amitriptyline 50 milliGRAM(s) Oral daily  amLODIPine   Tablet 10 milliGRAM(s) Oral daily  atorvastatin 10 milliGRAM(s) Oral at bedtime  chlorhexidine 0.12% Liquid 15 milliLiter(s) Oral Mucosa two times a day  chlorhexidine 4% Liquid 1 Application(s) Topical <User Schedule>  famotidine    Tablet 20 milliGRAM(s) Oral daily  hydrALAZINE 100 milliGRAM(s) Oral three times a day  labetalol 400 milliGRAM(s) Oral every 8 hours  levETIRAcetam  Solution 1000 milliGRAM(s) Oral two times a day  piperacillin/tazobactam IVPB.. 3.375 Gram(s) IV Intermittent every 8 hours  vancomycin  IVPB 1000 milliGRAM(s) IV Intermittent <User Schedule>    MEDICATIONS  (PRN):      ALLERGIES:  Allergies    No Known Allergies    Intolerances        LABS:                        12.2   8.83  )-----------( 274      ( 24 Feb 2020 02:37 )             34.9     02-24    139  |  104  |  13  ----------------------------<  149<H>  3.2<L>   |  21<L>  |  1.07    Ca    9.5      24 Feb 2020 02:37  Phos  2.6     02-24  Mg     1.9     02-24    TPro  7.3  /  Alb  3.8  /  TBili  0.3  /  DBili  x   /  AST  12  /  ALT  16  /  AlkPhos  96  02-24          RADIOLOGY & ADDITIONAL TESTS: Reviewed INTERVAL HPI/OVERNIGHT EVENTS:    SUBJECTIVE: Patient seen and examined at bedside. Unable to obtain.     OBJECTIVE:    VITAL SIGNS:  ICU Vital Signs Last 24 Hrs  T(C): 37.6 (24 Feb 2020 04:00), Max: 38.3 (23 Feb 2020 16:00)  T(F): 99.6 (24 Feb 2020 04:00), Max: 100.9 (23 Feb 2020 16:00)  HR: 102 (24 Feb 2020 07:00) (64 - 129)  BP: 149/100 (24 Feb 2020 07:00) (114/75 - 179/115)  BP(mean): 113 (24 Feb 2020 07:00) (85 - 140)  ABP: --  ABP(mean): --  RR: 32 (24 Feb 2020 07:00) (9 - 34)  SpO2: 96% (24 Feb 2020 07:00) (86% - 100%)        02-23 @ 07:01  -  02-24 @ 07:00  --------------------------------------------------------  IN: 3352 mL / OUT: 1480 mL / NET: 1872 mL      CAPILLARY BLOOD GLUCOSE      PHYSICAL EXAM:    General: Awake. NAD.   HEENT: PERRL, clear conjunctiva  Neck: supple  Respiratory: CTA b/l  Cardiovascular: +S1/S2; Tachycardic, regular rhythm.   Abdomen: soft, NT/ND; +BS x4  Extremities: WWP, 2+ peripheral pulses b/l; no LE edema  Skin: normal color and turgor; no rash  Neurological: Awake. Follows commands.     MEDICATIONS:  MEDICATIONS  (STANDING):  acetaminophen    Suspension .. 650 milliGRAM(s) Oral every 6 hours  amitriptyline 50 milliGRAM(s) Oral daily  amLODIPine   Tablet 10 milliGRAM(s) Oral daily  atorvastatin 10 milliGRAM(s) Oral at bedtime  chlorhexidine 0.12% Liquid 15 milliLiter(s) Oral Mucosa two times a day  chlorhexidine 4% Liquid 1 Application(s) Topical <User Schedule>  famotidine    Tablet 20 milliGRAM(s) Oral daily  hydrALAZINE 100 milliGRAM(s) Oral three times a day  labetalol 400 milliGRAM(s) Oral every 8 hours  levETIRAcetam  Solution 1000 milliGRAM(s) Oral two times a day  piperacillin/tazobactam IVPB.. 3.375 Gram(s) IV Intermittent every 8 hours  vancomycin  IVPB 1000 milliGRAM(s) IV Intermittent <User Schedule>    MEDICATIONS  (PRN):      ALLERGIES:  Allergies    No Known Allergies    Intolerances        LABS:                        12.2   8.83  )-----------( 274      ( 24 Feb 2020 02:37 )             34.9     02-24    139  |  104  |  13  ----------------------------<  149<H>  3.2<L>   |  21<L>  |  1.07    Ca    9.5      24 Feb 2020 02:37  Phos  2.6     02-24  Mg     1.9     02-24    TPro  7.3  /  Alb  3.8  /  TBili  0.3  /  DBili  x   /  AST  12  /  ALT  16  /  AlkPhos  96  02-24          RADIOLOGY & ADDITIONAL TESTS: Reviewed INTERVAL HPI/OVERNIGHT EVENTS:    SUBJECTIVE: Patient seen and examined at bedside. ROS unable to obtain     VITAL SIGNS:  ICU Vital Signs Last 24 Hrs  T(C): 37.6 (24 Feb 2020 04:00), Max: 38.3 (23 Feb 2020 16:00)  T(F): 99.6 (24 Feb 2020 04:00), Max: 100.9 (23 Feb 2020 16:00)  HR: 102 (24 Feb 2020 07:00) (64 - 129)  BP: 149/100 (24 Feb 2020 07:00) (114/75 - 179/115)  BP(mean): 113 (24 Feb 2020 07:00) (85 - 140)  ABP: --  ABP(mean): --  RR: 32 (24 Feb 2020 07:00) (9 - 34)  SpO2: 96% (24 Feb 2020 07:00) (86% - 100%)      02-23 @ 07:01  -  02-24 @ 07:00  --------------------------------------------------------  IN: 3352 mL / OUT: 1480 mL / NET: 1872 mL      CAPILLARY BLOOD GLUCOSE      PHYSICAL EXAM:    General: Awake and alert. NAD.   HEENT: PERRL, clear conjunctiva  Neck: supple  Respiratory: CTA b/l  Cardiovascular: +S1/S2; Tachycardic, regular rhythm.   Abdomen: soft, NT/ND; +BS x4  Extremities: WWP, 2+ peripheral pulses b/l; no LE edema  Skin: normal color and turgor; no rash  Neurological: Awake. Follows commands.     MEDICATIONS:  MEDICATIONS  (STANDING):  acetaminophen    Suspension .. 650 milliGRAM(s) Oral every 6 hours  amitriptyline 50 milliGRAM(s) Oral daily  amLODIPine   Tablet 10 milliGRAM(s) Oral daily  atorvastatin 10 milliGRAM(s) Oral at bedtime  chlorhexidine 0.12% Liquid 15 milliLiter(s) Oral Mucosa two times a day  chlorhexidine 4% Liquid 1 Application(s) Topical <User Schedule>  famotidine    Tablet 20 milliGRAM(s) Oral daily  hydrALAZINE 100 milliGRAM(s) Oral three times a day  labetalol 400 milliGRAM(s) Oral every 8 hours  levETIRAcetam  Solution 1000 milliGRAM(s) Oral two times a day  piperacillin/tazobactam IVPB.. 3.375 Gram(s) IV Intermittent every 8 hours  vancomycin  IVPB 1000 milliGRAM(s) IV Intermittent <User Schedule>    MEDICATIONS  (PRN):      ALLERGIES:  Allergies    No Known Allergies    Intolerances        LABS:                        12.2   8.83  )-----------( 274      ( 24 Feb 2020 02:37 )             34.9     02-24    139  |  104  |  13  ----------------------------<  149<H>  3.2<L>   |  21<L>  |  1.07    Ca    9.5      24 Feb 2020 02:37  Phos  2.6     02-24  Mg     1.9     02-24    TPro  7.3  /  Alb  3.8  /  TBili  0.3  /  DBili  x   /  AST  12  /  ALT  16  /  AlkPhos  96  02-24          RADIOLOGY & ADDITIONAL TESTS: Reviewed

## 2020-02-24 NOTE — EEG REPORT - NS EEG TEXT BOX
[facility]   REPORT OF CONTINUOUS VIDEO EEG     [facility]: [address], [place] [address]  [facility]: [id number], [place] [address]  [facility]: [address] [place] [address]    Patient Name: LUIS EDUARDO FRANKEL  Age and : 40y ([birth year])  MRN #: [id number]  Location: [place] [address]  Referring Physician: Cristiane Ernst    Start Time/Date: 08:00 on 20  EEG displaced Time/Date: 21:55 on 20  Duration: 13hr 24m    _____________________________________________________________  STUDY INFORMATION    EEG Recording Technique:  The patient underwent continuous Video-EEG monitoring, using Telemetry System hardware on the XLT[employer] Digital System. EEG and video data were stored on a computer hard drive with important events saved in digital archive files. The material was reviewed by a physician (electroencephalographer / epileptologist) on a daily basis. Christopher and seizure detection algorithms were utilized and reviewed. An EEG Technician attended to the patient, and was available throughout daytime work hours.  The epilepsy center neurologist was available in person or on call 24-hours per day.    EEG Placement and Labeling of Electrodes:  The EEG was performed utilizing 20 channel referential EEG connections (coronal over temporal over parasagittal montage) using all standard 10-20 electrode placements with EKG, with additional electrodes placed in the inferior temporal region using the modified 10-10 montage electrode placements for elective admissions, or if deemed necessary. Recording was at a sampling rate of 256 samples per second per channel. Time synchronized digital video recording was done simultaneously with EEG recording. A low light infrared camera was used for low light recording.     _____________________________________________________________  HISTORY    Patient is a 40y old  Male who presents with a chief complaint of AMS.    PERTINENT MEDICATION:  levETIRAcetam  Solution 1000 milliGRAM(s) Oral two times a day    _____________________________________________________________  STUDY INTERPRETATION    Findings: The background was continuous, spontaneously variable and reactive. During wakefulness, the posterior dominant rhythm consisted of asymmetric, poorly-modulated 8 Hz activity, with amplitude to 30 uV, that was seen over the left hemisphere.     Background Slowing:  Diffuse delta with superimposed faster activities.    Focal Slowing:   Continuous polymorphic delta slowing in the right hemisphere.     Sleep Background:  Drowsiness was characterized by fragmentation, attenuation, and slowing of the background activity.    Stage II sleep transients were not recorded.     Other Non-Epileptiform Findings:  Attenuation of fast activity in the right hemisphere.  Breach effect in right hemisphere, max parietally, characterized by higher amplitude and sharply contoured waves.     Interictal Epileptiform Activity:   None were present.    Events:  One push button event at 22:56 for unclear reason. Patient appeared interacting appropriately with RN. Unfortunately EEG electrodes were already displaced.     Activation Procedures:   Hyperventilation was not performed.    Hyperventilation was not performed.      Artifacts:  Profound electrode artifacts were noted.    ECG:  The heart rate on single channel ECG was predominantly between 60-70 BPM.    _____________________________________________________________  EEG SUMMARY/CLASSIFICATION    Abnormal EEG in an altered patient.  - One push button event at 22:56 for unclear reason. Patient appeared interacting appropriately with RN. Unfortunately EEG electrodes were already displaced.   - Continuous polymorphic delta slowing in the right hemisphere.   - Attenuation of fast activity in the right hemisphere.  - Mild to moderate generalized slowing.  - Breach effect in right hemisphere, max parietally, characterized by higher amplitude and sharply contoured waves.     _____________________________________________________________  EEG IMPRESSION/CLINICAL CORRELATE    Suboptimal study due to prominent electrode artifacts, which were displaced at 21:55.    Abnormal EEG study.  1. One push button event at 22:56 for unclear reason. Patient appeared interacting appropriately with RN. Unfortunately EEG electrodes were already displaced.   2. Cortical dysfunction and structural abnormality in the right hemisphere.  3. Mild to moderate nonspecific diffuse or multifocal cerebral dysfunction.   4. No seizure seen.  5. 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       Urbano Colvin MD  Attending Physician, [facility] [place]

## 2020-02-24 NOTE — CHART NOTE - NSCHARTNOTEFT_GEN_A_CORE
MICU Transfer Note    Transfer from: MICU    Transfer to: (  ) Medicine    (  ) Telemetry     (   ) RCU        (    ) Palliative         (   ) Stroke Unit          (   ) __________________    Accepting Physician:  Signout given to:     HPI:   41 yo M c PMH of HTN and ?ICH in September 2019 (with residual LUE and LLE weakness) transferred from  where he presented with AMS and unresponsiveness. per father, pt was his normal self (A&Ox3, talking normally, had LUE and LLE weakness that was at his baseline) all morning aside from having a HA this morning. At 1:15PM, pt told his father he had severe dizziness, then pt could not move his LUE and LLE at all (worse than baseline), then lost consciousness. father denies seizure activity. LKN 1:10PM today. At , code stroke was called on arrival, NIHSS 41, pt was obtunded and intubated. CT showed: "acute parenchymal hematoma in the left thalamus. It measures approximately 1.5 cm in diameter with mild associated vasogenic edema. There is mild left-sided intraventricular hemorrhage. There is no ventricular trapping at this time. There is moderate to advanced right basal ganglia and to a laminectomy encephalomalacia consistent with previous infarct or hemorrhage with an associated craniotomy defect." CTA head and neck showed "normal arterial findings." no tPA given. pt's obtunded state was OOP to extent of his bleed and per  note pt had LLE shaking, so seizure was presumed and pt loaded with keppra, given ativan, started on propofol drip, and sent to [facility] ED. not on bloodthinners. no recent travel/ill contacts. Father states pt had intracranial bleeding in September and pt had neurosurgery but father isn't sure what kind. all information should be at [facility]. no FH of kidney disease, ICH, SLE.      meds given: 20 etomidate, 100 mcg fentanyl, labetalol 10 mg, nicardipine drip, keppra 1000 mg, ativan 10 mg, propofol drip, succinylcholine  in [facility] ED, pt assessed by neurosurgery and neuro, repeat CTH was relatively stable, so pt cleared for admission to MICU. was continued on propofol and nicardipine.     MICU COURSE:  Patient was admitted for q1 neuro checks and repeat CTH which was s  precedex for tachycardia, CT head, 24 hour EEG, Utox  O/N: tachycardic/anxious so fentanyl gtt increased.   2/20: will decrease sedation for poss extubation today, pend MRI, patient tachy/hypertensive 2/21: to agitation controlled w/ labetalol. Patient febrile o/n, will intiate vanc/zosyn for presumed aspiration pna  2/22:  precedex dc/d, labetalol increased to 300mg BID, pt. persistently hypertensive, given 30mg labetalol push overall (10mg once and 20mg after).  Will give push dose of fentanyl 25 and reassess.  If BP still elevated, will start on cardine gtt.Febrile 101-102  Started back on Precedex for increased agitiation, tachycardia refractory to meds. MRSA in 1/2 Bcx bottles after 40 hrs. . Given Vanco 1 g x 1 in PM, repeat cultures sent.   CTH unchanged   2/23: c/w vanc/zosyn, wean precedex and cardene gtt, tylenol ATC for discomfort  2/24: will start clonidine patch for resistant hypertension and perform work up for secondary htn. repeat TTE with bubble        ASSESSMENT & PLAN:             FOR FOLLOW UP: MICU Transfer Note  /  Transfer from: MICU/    Transfer to: (  ) Medicine    (  ) Telemetry     (   ) RCU        (    ) Palliative         (   ) Stroke Unit          (   ) __________________    Accepting Physician:  Signout given to:     HPI:   41 yo M c PMH of HTN and ?ICH in September 2019 (with residual LUE and LLE weakness) transferred from  where he presented with AMS and unresponsiveness. per father, pt was his normal self (A&Ox3, talking normally, had LUE and LLE weakness that was at his baseline) all morning aside from having a HA this morning. At 1:15PM, pt told his father he had severe dizziness, then pt could not move his LUE and LLE at all (worse than baseline), then lost consciousness. father denies seizure activity. LKN 1:10PM today. At , code stroke was called on arrival, NIHSS 41, pt was obtunded and intubated. CT showed: "acute parenchymal hematoma in the left thalamus. It measures approximately 1.5 cm in diameter with mild associated vasogenic edema. There is mild left-sided intraventricular hemorrhage. There is no ventricular trapping at this time. There is moderate to advanced right basal ganglia and to a laminectomy encephalomalacia consistent with previous infarct or hemorrhage with an associated craniotomy defect." CTA head and neck showed "normal arterial findings." no tPA given. pt's obtunded state was OOP to extent of his bleed and per  note pt had LLE shaking, so seizure was presumed and pt loaded with keppra, given ativan, started on propofol drip, and sent to [facility] ED. not on bloodthinners. no recent travel/ill contacts. Father states pt had intracranial bleeding in September and pt had neurosurgery but father isn't sure what kind. all information should be at [facility]. no FH of kidney disease, ICH, SLE.      meds given: 20 etomidate, 100 mcg fentanyl, labetalol 10 mg, nicardipine drip, keppra 1000 mg, ativan 10 mg, propofol drip, succinylcholine  in [facility] ED, pt assessed by neurosurgery and neuro, repeat CTH was relatively stable, so pt cleared for admission to MICU. was continued on propofol and nicardipine.     MICU COURSE:  Patient was admitted for airway management, q1 neuro checks, hypertonic saline x 72 hours and repeat CTH which was stable. Patient was increasingly hypertensive/tachycardic while intubated receiving pushes of fentanyl and precedex drips which were intermittently effective. Patient remained hypertensive despite symptomatic treatment for agitation and patient was placed on a nicardipine drip. Patient remained intermittently hypertensive and tachycardic. Patient was eventually extubated w/ stable respiratory status. Hospital course was complicated by LLE shaking, leftward gaze and unresponsiveness thought to be seizures but were confirmed to not be on EEG. Due to intermittent hypertension/tachycardia/seizure like activity, it was hypothesized that patient was experiencing alcohol/cocaine withdrawal. Patient was treated w/ phenobarb and ativan on separate occasions with improvement of symptoms. OG tube was placed and patient was reinitiated on home HTN regimen, labetalol dose was increased to 400 TID. Despite these interventions patient remains intermittently tachycardic/hypertensive. Clonidine patch was initiated. Patient eventually had an improvement in mental status, from only knowing his name to answering yes/no questions. Patient subsequently has +blood cultures that grew MRSA, and patient was started on Vanc. ID consulted, patient complained of back pain. CT spine is pending for evaluation of discitis/osteomyelitis. Additionally, hypercoagulable work up was performed was negative for increased hypercoagulability and finished a 5 day course of zosyn (2/20-2/24) for presumed aspiration PNA. Pend secondary HTN w/u. Repeat blood cx NGTD.  Patient stable and safe for transfer.          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  - 24h head CT showed stable hemorrhage  - elevated HOB 30 degrees   - keppra increased to1g bid per neuro recs  - Goal SBP <160  - labetalol increased to 400mg BID  - q4h neuro checks  - 3% saline d/cd given 72 hours s/p stroke  - EEG d'cd: no seizure seen on EEG, but noted to have potential epileptogenic focus in the right parietal region, cortical dysfunction and structural abnormality in the right hemisphere, and mild nonspecific diffuse or multifocal cerebral dysfunction  - MRI brain w, w/o when able  - TTE showed increased LV wall thickness, bubble study was unsuccessful due to poor visualization. Will repeat.   - LE duplex negative for DVTs  - f/u hypercoagulable work up - results so far show decreased protein S activity, low factor II assay, high factor VIII assay  - neuro recs appreciated, will get heme consult for hypercoagulable w/u in a non-acute setting once pt. has been downgraded from the MICU    CARDIAC: h/o HTN, tachycardic/hypertensive persistently on unit   - goal SBP < 160  - holding cardine gtt, precedex dc/d  - labetalol increased to 400mg bid  - clonidine patch placed   - hydralazine 100 TID   - amlodipine 10 qd   - Patient w/ htn/tachycardia initially thought due to alcohol withdrawal. D'cd phenobarb with ativan PRN. Now suspicious for secondary hypertension, will get full w/u with renal artery duplex, metanephrines and renin.     RESPIRATORY  -on 2L NC PRN, sating well on RA.   -zosyn d'cd as per ID recs.     ID  - no longer febrile, WBC WNL  - Vanc for MRSA on blood cx.   - Zosyn d'cd as per ID recs   - Rpt BCx negative at 24 hours  -fu resp culture/gram stain    GI  -on tube feeds    ENDO  -no acute issues  - ISS; A1C 4.9    RENAL  -d/c hypertonic saline given that it is 72 hrs s/p stroke/24 hrs s/p last stable ct scan    HEME/ONC:  -DVT PPX- SCDs and start SQ heparin now 5 days s/p bleed      FOR FOLLOW UP:  -CT spine for osteo/discitis  -MR brain  -Secondary HTN w/u MICU Transfer Note  /  Transfer from: MICU/    Transfer to: (  ) Medicine    (  ) Telemetry     (   ) RCU        (    ) Palliative         (   ) Stroke Unit          (   ) __________________    Accepting Physician:  Signout given to:     HPI:   39 yo M c PMH of HTN and ?ICH in September 2019 (with residual LUE and LLE weakness) transferred from  where he presented with AMS and unresponsiveness. per father, pt was his normal self (A&Ox3, talking normally, had LUE and LLE weakness that was at his baseline) all morning aside from having a HA this morning. At 1:15PM, pt told his father he had severe dizziness, then pt could not move his LUE and LLE at all (worse than baseline), then lost consciousness. father denies seizure activity. LKN 1:10PM today. At , code stroke was called on arrival, NIHSS 41, pt was obtunded and intubated. CT showed: "acute parenchymal hematoma in the left thalamus. It measures approximately 1.5 cm in diameter with mild associated vasogenic edema. There is mild left-sided intraventricular hemorrhage. There is no ventricular trapping at this time. There is moderate to advanced right basal ganglia and to a laminectomy encephalomalacia consistent with previous infarct or hemorrhage with an associated craniotomy defect." CTA head and neck showed "normal arterial findings." no tPA given. pt's obtunded state was OOP to extent of his bleed and per  note pt had LLE shaking, so seizure was presumed and pt loaded with keppra, given ativan, started on propofol drip, and sent to [facility] ED. not on bloodthinners. no recent travel/ill contacts. Father states pt had intracranial bleeding in September and pt had neurosurgery but father isn't sure what kind. all information should be at [facility]. no FH of kidney disease, ICH, SLE.      meds given: 20 etomidate, 100 mcg fentanyl, labetalol 10 mg, nicardipine drip, keppra 1000 mg, ativan 10 mg, propofol drip, succinylcholine  in [facility] ED, pt assessed by neurosurgery and neuro, repeat CTH was relatively stable, so pt cleared for admission to MICU. was continued on propofol and nicardipine.     MICU COURSE:  Patient was admitted for airway management, q1 neuro checks, hypertonic saline x 72 hours and repeat CTH which was stable. Patient was increasingly hypertensive/tachycardic while intubated receiving pushes of fentanyl and precedex drips which were intermittently effective. Patient remained hypertensive despite symptomatic treatment for agitation and patient was placed on a nicardipine drip. Patient remained intermittently hypertensive and tachycardic. Patient was eventually extubated w/ stable respiratory status. Hospital course was complicated by LLE shaking, leftward gaze and unresponsiveness thought to be seizures but were confirmed to not be on EEG. Due to intermittent hypertension/tachycardia/seizure like activity, it was hypothesized that patient was experiencing alcohol/cocaine withdrawal. Patient was treated w/ phenobarb and ativan on separate occasions with improvement of symptoms. OG tube was placed and patient was reinitiated on home HTN regimen, labetalol dose was increased to 400 TID. Despite these interventions patient remains intermittently tachycardic/hypertensive. Clonidine patch was initiated. Patient eventually had an improvement in mental status, from only knowing his name to answering yes/no questions. Patient subsequently has +blood cultures that grew MRSA, and patient was started on Vanc. ID consulted, patient complained of back pain. CT spine is pending for evaluation of discitis/osteomyelitis. Additionally, hypercoagulable work up was performed was negative for increased hypercoagulability and finished a 5 day course of zosyn (2/20-2/24) for presumed aspiration PNA. Pend secondary HTN w/u. Repeat blood cx NGTD.  Patient stable and safe for transfer.          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  - 24h head CT showed stable hemorrhage  - elevated HOB 30 degrees   - keppra increased to1g bid per neuro recs  - Goal SBP <160  - labetalol increased to 400mg BID  - q4h neuro checks  - 3% saline d/cd given 72 hours s/p stroke  - EEG d'cd: no seizure seen on EEG, but noted to have potential epileptogenic focus in the right parietal region, cortical dysfunction and structural abnormality in the right hemisphere, and mild nonspecific diffuse or multifocal cerebral dysfunction  - MRI brain w, w/o when able  - TTE showed increased LV wall thickness, bubble study was unsuccessful due to poor visualization. Will repeat.   - LE duplex negative for DVTs  - f/u hypercoagulable work up - results so far show decreased protein S activity, low factor II assay, high factor VIII assay  - neuro recs appreciated, will get heme consult for hypercoagulable w/u in a non-acute setting once pt. has been downgraded from the MICU    CARDIAC: h/o HTN, tachycardic/hypertensive persistently on unit   - goal SBP < 160  - holding cardine gtt, precedex dc/d  - labetalol increased to 400mg bid  - clonidine patch placed   - hydralazine 100 TID   - amlodipine 10 qd   - Patient w/ htn/tachycardia initially thought due to alcohol withdrawal. D'cd phenobarb with ativan PRN. Now suspicious for secondary hypertension, will get full w/u with renal artery duplex, metanephrines and renin.     RESPIRATORY  -on 2L NC PRN, sating well on RA.   -zosyn d'cd as per ID recs.     ID  - no longer febrile, WBC WNL  - Vanc for MRSA on blood cx.   - Zosyn d'cd as per ID recs   - Rpt BCx negative at 24 hours  -fu resp culture/gram stain    GI  -on tube feeds    ENDO  -no acute issues  - ISS; A1C 4.9    RENAL  -d/c hypertonic saline given that it is 72 hrs s/p stroke/24 hrs s/p last stable ct scan    HEME/ONC:  -DVT PPX- SCDs and start SQ heparin now 5 days s/p bleed      FOR FOLLOW UP:  -CT spine for osteo/discitis  -MR brain  -Secondary HTN w/u      ccm attending:  pt is a 39 yo male with hx etoh, cocaine use who presented with change in mental status and  hypertension now resolved, being treated for mrsa in blood culture and needs follow up.  stable hemodynamically . for tx to the floor  SEVEN russo

## 2020-02-24 NOTE — PROGRESS NOTE ADULT - ATTENDING COMMENTS
40 yo male with hx of EtOH and cocaine abuse and hx of CVAs admitted with L thalamic IPH; intubated for airway protection, now extubated and doing well. Will start Clonidine for improved BP control. No e/o withdrawal. C/w Vanco for MRSA bacteremia - will obtain repeat TTE and CT thoracic spine. Repeat blood cultures negative.  Stable for transfer to floor.

## 2020-02-24 NOTE — PROGRESS NOTE ADULT - ASSESSMENT
40 y/o M w/ Mhx HTN, T2DM, prior ETOH and cocaine abuse with history of recent R basal ganglia stroke (with residual LUE and LLE weakness), L thalamic ischemic stroke, and R parietal density transferred from  where he presented with AMS/obtundation preceded by dizziness, HA, and worsening left sided weakness found to have ICH, intubated for airway protection w/ hospital course c/b LLE shaking presumed to be seizure requiring keppra and ativan. Patient was transferred to [facility] for neuro eval and EEG, now extubated. CT showing stable hemorrhage, currently undergoing workup for hemorrhagic stroke etiology and being managed for possible EtOH withdrawal and secondary hypertension given persistent tachycardia/HTN despite max doses of nicardipine.    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 with vasogenic edema  - 24h head CT showed stable hemorrhage  - elevated HOB 30 degrees   - keppra increased to1g bid per neuro recs  - Goal SBP <160  - labetalol increased to 400mg BID  - q4h neuro checks  - 3% saline d/cd given 72 hours s/p stroke  - EEG d'cd: no seizure seen on EEG, but noted to have potential epileptogenic focus in the right parietal region, cortical dysfunction and structural abnormality in the right hemisphere, and mild nonspecific diffuse or multifocal cerebral dysfunction  - MRI brain w, w/o when able  - TTE showed increased LV wall thickness, bubble study was unsuccessful due to poor visualization. Will repeat.   - LE duplex negative for DVTs  - f/u hypercoagulable work up - results so far show decreased protein S activity, low factor II assay, high factor VIII assay  - neuro recs appreciated, will get heme consult for hypercoagulable w/u in a non-acute setting once pt. has been downgraded from the MICU    CARDIAC: h/o HTN, tachycardic/hypertensive persistently on unit   - goal SBP < 160  - holding cardine gtt, precedex dc/d  - labetalol increased to 400mg bid  - clonidine patch placed   - hydralazine 100 TID   - amlodipine 10 qd   - Patient w/ htn/tachycardia initially thought due to alcohol withdrawal. D'cd phenobarb with ativan PRN. Now suspicious for secondary hypertension, will get full w/u with renal artery duplex, metanephrines and renin.     RESPIRATORY  -on 2L NC PRN, sating well on RA.   -zosyn d'cd as per ID recs.     ID  - no longer febrile, WBC WNL  - Vanc for MRSA on blood cx.   - Zosyn d'cd as per ID recs   - Rpt BCx negative at 24 hours  -fu resp culture/gram stain    GI  -on tube feeds    ENDO  -no acute issues  - ISS; A1C 4.9    RENAL  -d/c hypertonic saline given that it is 72 hrs s/p stroke/24 hrs s/p last stable ct scan    HEME/ONC:  -DVT PPX- SCDs and start SQ heparin now 5 days s/p bleed

## 2020-02-25 DIAGNOSIS — Z02.9 ENCOUNTER FOR ADMINISTRATIVE EXAMINATIONS, UNSPECIFIED: ICD-10-CM

## 2020-02-25 DIAGNOSIS — Z29.9 ENCOUNTER FOR PROPHYLACTIC MEASURES, UNSPECIFIED: ICD-10-CM

## 2020-02-25 DIAGNOSIS — R78.81 BACTEREMIA: ICD-10-CM

## 2020-02-25 DIAGNOSIS — I62.9 NONTRAUMATIC INTRACRANIAL HEMORRHAGE, UNSPECIFIED: ICD-10-CM

## 2020-02-25 DIAGNOSIS — I10 ESSENTIAL (PRIMARY) HYPERTENSION: ICD-10-CM

## 2020-02-25 LAB
-  CEFAZOLIN: SIGNIFICANT CHANGE UP
-  CIPROFLOXACIN: SIGNIFICANT CHANGE UP
-  CLINDAMYCIN: SIGNIFICANT CHANGE UP
-  DAPTOMYCIN: SIGNIFICANT CHANGE UP
-  ERYTHROMYCIN: SIGNIFICANT CHANGE UP
-  GENTAMICIN: SIGNIFICANT CHANGE UP
-  LEVOFLOXACIN: SIGNIFICANT CHANGE UP
-  LINEZOLID: SIGNIFICANT CHANGE UP
-  MOXIFLOXACIN(AEROBIC): SIGNIFICANT CHANGE UP
-  OXACILLIN: SIGNIFICANT CHANGE UP
-  PENICILLIN: SIGNIFICANT CHANGE UP
-  RIFAMPIN.: SIGNIFICANT CHANGE UP
-  TETRACYCLINE: SIGNIFICANT CHANGE UP
-  TRIMETHOPRIM/SULFAMETHOXAZOLE: SIGNIFICANT CHANGE UP
-  VANCOMYCIN: SIGNIFICANT CHANGE UP
ALBUMIN SERPL ELPH-MCNC: 3.8 G/DL — SIGNIFICANT CHANGE UP (ref 3.3–5)
ALBUMIN SERPL ELPH-MCNC: 3.8 G/DL — SIGNIFICANT CHANGE UP (ref 3.3–5)
ALP SERPL-CCNC: 90 U/L — SIGNIFICANT CHANGE UP (ref 40–120)
ALP SERPL-CCNC: 90 U/L — SIGNIFICANT CHANGE UP (ref 40–120)
ALT FLD-CCNC: 14 U/L — SIGNIFICANT CHANGE UP (ref 4–41)
ALT FLD-CCNC: 14 U/L — SIGNIFICANT CHANGE UP (ref 4–41)
ANION GAP SERPL CALC-SCNC: 14 MMO/L — SIGNIFICANT CHANGE UP (ref 7–14)
ANION GAP SERPL CALC-SCNC: 14 MMO/L — SIGNIFICANT CHANGE UP (ref 7–14)
AST SERPL-CCNC: 10 U/L — SIGNIFICANT CHANGE UP (ref 4–40)
AST SERPL-CCNC: 10 U/L — SIGNIFICANT CHANGE UP (ref 4–40)
BACTERIA BLD CULT: SIGNIFICANT CHANGE UP
BASOPHILS # BLD AUTO: 0.02 K/UL — SIGNIFICANT CHANGE UP (ref 0–0.2)
BASOPHILS NFR BLD AUTO: 0.2 % — SIGNIFICANT CHANGE UP (ref 0–2)
BILIRUB SERPL-MCNC: 0.3 MG/DL — SIGNIFICANT CHANGE UP (ref 0.2–1.2)
BILIRUB SERPL-MCNC: 0.3 MG/DL — SIGNIFICANT CHANGE UP (ref 0.2–1.2)
BUN SERPL-MCNC: 14 MG/DL — SIGNIFICANT CHANGE UP (ref 7–23)
BUN SERPL-MCNC: 14 MG/DL — SIGNIFICANT CHANGE UP (ref 7–23)
CALCIUM SERPL-MCNC: 9.8 MG/DL — SIGNIFICANT CHANGE UP (ref 8.4–10.5)
CALCIUM SERPL-MCNC: 9.8 MG/DL — SIGNIFICANT CHANGE UP (ref 8.4–10.5)
CARDIOLIPIN IGM SER-MCNC: 2.02 MPL — SIGNIFICANT CHANGE UP (ref 0–11)
CARDIOLIPIN IGM SER-MCNC: 5.57 GPL — SIGNIFICANT CHANGE UP (ref 0–23)
CHLORIDE SERPL-SCNC: 105 MMOL/L — SIGNIFICANT CHANGE UP (ref 98–107)
CHLORIDE SERPL-SCNC: 105 MMOL/L — SIGNIFICANT CHANGE UP (ref 98–107)
CO2 SERPL-SCNC: 21 MMOL/L — LOW (ref 22–31)
CO2 SERPL-SCNC: 21 MMOL/L — LOW (ref 22–31)
CREAT SERPL-MCNC: 1.17 MG/DL — SIGNIFICANT CHANGE UP (ref 0.5–1.3)
CREAT SERPL-MCNC: 1.17 MG/DL — SIGNIFICANT CHANGE UP (ref 0.5–1.3)
EOSINOPHIL # BLD AUTO: 0.33 K/UL — SIGNIFICANT CHANGE UP (ref 0–0.5)
EOSINOPHIL NFR BLD AUTO: 3.9 % — SIGNIFICANT CHANGE UP (ref 0–6)
GLUCOSE SERPL-MCNC: 132 MG/DL — HIGH (ref 70–99)
GLUCOSE SERPL-MCNC: 132 MG/DL — HIGH (ref 70–99)
HCT VFR BLD CALC: 34.4 % — LOW (ref 39–50)
HGB BLD-MCNC: 11.8 G/DL — LOW (ref 13–17)
IMM GRANULOCYTES NFR BLD AUTO: 0.7 % — SIGNIFICANT CHANGE UP (ref 0–1.5)
LYMPHOCYTES # BLD AUTO: 1.43 K/UL — SIGNIFICANT CHANGE UP (ref 1–3.3)
LYMPHOCYTES # BLD AUTO: 17.1 % — SIGNIFICANT CHANGE UP (ref 13–44)
MAGNESIUM SERPL-MCNC: 2.2 MG/DL — SIGNIFICANT CHANGE UP (ref 1.6–2.6)
MCHC RBC-ENTMCNC: 29.7 PG — SIGNIFICANT CHANGE UP (ref 27–34)
MCHC RBC-ENTMCNC: 34.3 % — SIGNIFICANT CHANGE UP (ref 32–36)
MCV RBC AUTO: 86.6 FL — SIGNIFICANT CHANGE UP (ref 80–100)
METHOD TYPE: SIGNIFICANT CHANGE UP
MONOCYTES # BLD AUTO: 0.61 K/UL — SIGNIFICANT CHANGE UP (ref 0–0.9)
MONOCYTES NFR BLD AUTO: 7.3 % — SIGNIFICANT CHANGE UP (ref 2–14)
MRSA SPEC QL CULT: SIGNIFICANT CHANGE UP
NEUTROPHILS # BLD AUTO: 5.91 K/UL — SIGNIFICANT CHANGE UP (ref 1.8–7.4)
NEUTROPHILS NFR BLD AUTO: 70.8 % — SIGNIFICANT CHANGE UP (ref 43–77)
NRBC # FLD: 0 K/UL — SIGNIFICANT CHANGE UP (ref 0–0)
ORGANISM # SPEC MICROSCOPIC CNT: SIGNIFICANT CHANGE UP
PHOSPHATE SERPL-MCNC: 3.6 MG/DL — SIGNIFICANT CHANGE UP (ref 2.5–4.5)
PLATELET # BLD AUTO: 275 K/UL — SIGNIFICANT CHANGE UP (ref 150–400)
PMV BLD: 10.4 FL — SIGNIFICANT CHANGE UP (ref 7–13)
POTASSIUM SERPL-MCNC: 3.3 MMOL/L — LOW (ref 3.5–5.3)
POTASSIUM SERPL-MCNC: 3.3 MMOL/L — LOW (ref 3.5–5.3)
POTASSIUM SERPL-SCNC: 3.3 MMOL/L — LOW (ref 3.5–5.3)
POTASSIUM SERPL-SCNC: 3.3 MMOL/L — LOW (ref 3.5–5.3)
PROT SERPL-MCNC: 7.4 G/DL — SIGNIFICANT CHANGE UP (ref 6–8.3)
PROT SERPL-MCNC: 7.4 G/DL — SIGNIFICANT CHANGE UP (ref 6–8.3)
RBC # BLD: 3.97 M/UL — LOW (ref 4.2–5.8)
RBC # FLD: 13.1 % — SIGNIFICANT CHANGE UP (ref 10.3–14.5)
SODIUM SERPL-SCNC: 140 MMOL/L — SIGNIFICANT CHANGE UP (ref 135–145)
SODIUM SERPL-SCNC: 140 MMOL/L — SIGNIFICANT CHANGE UP (ref 135–145)
VANCOMYCIN TROUGH SERPL-MCNC: 17.8 UG/ML — SIGNIFICANT CHANGE UP (ref 10–20)
VANCOMYCIN TROUGH SERPL-MCNC: 27.4 UG/ML — CRITICAL HIGH (ref 10–20)
WBC # BLD: 8.36 K/UL — SIGNIFICANT CHANGE UP (ref 3.8–10.5)
WBC # FLD AUTO: 8.36 K/UL — SIGNIFICANT CHANGE UP (ref 3.8–10.5)

## 2020-02-25 PROCEDURE — 72132 CT LUMBAR SPINE W/DYE: CPT | Mod: 26

## 2020-02-25 PROCEDURE — 99223 1ST HOSP IP/OBS HIGH 75: CPT

## 2020-02-25 PROCEDURE — 99233 SBSQ HOSP IP/OBS HIGH 50: CPT

## 2020-02-25 PROCEDURE — 72129 CT CHEST SPINE W/DYE: CPT | Mod: 26

## 2020-02-25 PROCEDURE — 99232 SBSQ HOSP IP/OBS MODERATE 35: CPT

## 2020-02-25 RX ORDER — LEVETIRACETAM 250 MG/1
1000 TABLET, FILM COATED ORAL
Refills: 0 | Status: DISCONTINUED | OUTPATIENT
Start: 2020-02-25 | End: 2020-02-26

## 2020-02-25 RX ORDER — AMITRIPTYLINE HCL 25 MG
50 TABLET ORAL DAILY
Refills: 0 | Status: DISCONTINUED | OUTPATIENT
Start: 2020-02-25 | End: 2020-02-26

## 2020-02-25 RX ORDER — FAMOTIDINE 10 MG/ML
20 INJECTION INTRAVENOUS DAILY
Refills: 0 | Status: DISCONTINUED | OUTPATIENT
Start: 2020-02-25 | End: 2020-02-25

## 2020-02-25 RX ORDER — HYDRALAZINE HCL 50 MG
100 TABLET ORAL THREE TIMES A DAY
Refills: 0 | Status: DISCONTINUED | OUTPATIENT
Start: 2020-02-25 | End: 2020-02-26

## 2020-02-25 RX ORDER — VANCOMYCIN HCL 1 G
750 VIAL (EA) INTRAVENOUS
Refills: 0 | Status: DISCONTINUED | OUTPATIENT
Start: 2020-02-25 | End: 2020-02-26

## 2020-02-25 RX ORDER — FAMOTIDINE 10 MG/ML
20 INJECTION INTRAVENOUS DAILY
Refills: 0 | Status: DISCONTINUED | OUTPATIENT
Start: 2020-02-25 | End: 2020-02-26

## 2020-02-25 RX ORDER — AMLODIPINE BESYLATE 2.5 MG/1
10 TABLET ORAL DAILY
Refills: 0 | Status: DISCONTINUED | OUTPATIENT
Start: 2020-02-25 | End: 2020-02-26

## 2020-02-25 RX ORDER — POTASSIUM CHLORIDE 20 MEQ
10 PACKET (EA) ORAL ONCE
Refills: 0 | Status: COMPLETED | OUTPATIENT
Start: 2020-02-25 | End: 2020-02-25

## 2020-02-25 RX ORDER — ATORVASTATIN CALCIUM 80 MG/1
10 TABLET, FILM COATED ORAL AT BEDTIME
Refills: 0 | Status: DISCONTINUED | OUTPATIENT
Start: 2020-02-25 | End: 2020-02-26

## 2020-02-25 RX ORDER — LABETALOL HCL 100 MG
400 TABLET ORAL EVERY 8 HOURS
Refills: 0 | Status: DISCONTINUED | OUTPATIENT
Start: 2020-02-25 | End: 2020-02-26

## 2020-02-25 RX ORDER — OLANZAPINE 15 MG/1
5 TABLET, FILM COATED ORAL AT BEDTIME
Refills: 0 | Status: DISCONTINUED | OUTPATIENT
Start: 2020-02-25 | End: 2020-02-26

## 2020-02-25 RX ADMIN — Medication 250 MILLIGRAM(S): at 17:51

## 2020-02-25 RX ADMIN — Medication 100 MILLIGRAM(S): at 14:35

## 2020-02-25 RX ADMIN — Medication 650 MILLIGRAM(S): at 04:24

## 2020-02-25 RX ADMIN — HEPARIN SODIUM 5000 UNIT(S): 5000 INJECTION INTRAVENOUS; SUBCUTANEOUS at 14:35

## 2020-02-25 RX ADMIN — Medication 400 MILLIGRAM(S): at 05:40

## 2020-02-25 RX ADMIN — Medication 400 MILLIGRAM(S): at 22:03

## 2020-02-25 RX ADMIN — Medication 100 MILLIEQUIVALENT(S): at 14:34

## 2020-02-25 RX ADMIN — Medication 650 MILLIGRAM(S): at 04:35

## 2020-02-25 RX ADMIN — OLANZAPINE 5 MILLIGRAM(S): 15 TABLET, FILM COATED ORAL at 22:04

## 2020-02-25 RX ADMIN — Medication 100 MILLIGRAM(S): at 22:04

## 2020-02-25 RX ADMIN — Medication 100 MILLIGRAM(S): at 05:40

## 2020-02-25 RX ADMIN — LEVETIRACETAM 1000 MILLIGRAM(S): 250 TABLET, FILM COATED ORAL at 17:52

## 2020-02-25 RX ADMIN — Medication 250 MILLIGRAM(S): at 11:08

## 2020-02-25 RX ADMIN — Medication 400 MILLIGRAM(S): at 14:35

## 2020-02-25 RX ADMIN — HEPARIN SODIUM 5000 UNIT(S): 5000 INJECTION INTRAVENOUS; SUBCUTANEOUS at 05:41

## 2020-02-25 RX ADMIN — LEVETIRACETAM 1000 MILLIGRAM(S): 250 TABLET, FILM COATED ORAL at 05:43

## 2020-02-25 RX ADMIN — ATORVASTATIN CALCIUM 10 MILLIGRAM(S): 80 TABLET, FILM COATED ORAL at 22:04

## 2020-02-25 RX ADMIN — CHLORHEXIDINE GLUCONATE 15 MILLILITER(S): 213 SOLUTION TOPICAL at 05:41

## 2020-02-25 RX ADMIN — Medication 1 PATCH: at 06:32

## 2020-02-25 RX ADMIN — CHLORHEXIDINE GLUCONATE 15 MILLILITER(S): 213 SOLUTION TOPICAL at 17:52

## 2020-02-25 RX ADMIN — AMLODIPINE BESYLATE 10 MILLIGRAM(S): 2.5 TABLET ORAL at 05:40

## 2020-02-25 RX ADMIN — Medication 50 MILLIGRAM(S): at 14:35

## 2020-02-25 RX ADMIN — Medication 1 PATCH: at 18:35

## 2020-02-25 RX ADMIN — FAMOTIDINE 20 MILLIGRAM(S): 10 INJECTION INTRAVENOUS at 14:35

## 2020-02-25 RX ADMIN — HEPARIN SODIUM 5000 UNIT(S): 5000 INJECTION INTRAVENOUS; SUBCUTANEOUS at 22:05

## 2020-02-25 NOTE — CHART NOTE - NSCHARTNOTEFT_GEN_A_CORE
MICU COURSE:  Patient was admitted for airway management, q1 neuro checks, hypertonic saline x 72 hours and repeat CTH which was stable. Patient was increasingly hypertensive/tachycardic while intubated receiving pushes of fentanyl and precedex drips which were intermittently effective. Patient remained hypertensive despite symptomatic treatment for agitation and patient was placed on a nicardipine drip. Patient remained intermittently hypertensive and tachycardic. Patient was eventually extubated w/ stable respiratory status. Hospital course was complicated by LLE shaking, leftward gaze and unresponsiveness thought to be seizures but were confirmed to not be on EEG. Due to intermittent hypertension/tachycardia/seizure like activity, it was hypothesized that patient was experiencing alcohol/cocaine withdrawal. Patient was treated w/ phenobarb and ativan on separate occasions with improvement of symptoms. OG tube was placed and patient was reinitiated on home HTN regimen, labetalol dose was increased to 400 TID. Despite these interventions patient remains intermittently tachycardic/hypertensive. Clonidine patch was initiated. Patient eventually had an improvement in mental status, from only knowing his name to answering yes/no questions. Patient subsequently has +blood cultures that grew MRSA, and patient was started on Vanc. ID consulted, patient complained of back pain. CT spine is pending for evaluation of discitis/osteomyelitis. Additionally, hypercoagulable work up was performed was negative for increased hypercoagulability and finished a 5 day course of zosyn (2/20-2/24) for presumed aspiration PNA. Pend secondary HTN w/u. Repeat blood cx NGTD.  Patient stable and safe for transfer.          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  - 24h head CT showed stable hemorrhage  - elevated HOB 30 degrees   - keppra increased to1g bid per neuro recs  - Goal SBP <160  - labetalol increased to 400mg BID  - q4h neuro checks  - 3% saline d/cd given 72 hours s/p stroke  - EEG d'cd: no seizure seen on EEG, but noted to have potential epileptogenic focus in the right parietal region, cortical dysfunction and structural abnormality in the right hemisphere, and mild nonspecific diffuse or multifocal cerebral dysfunction  - MRI brain w, w/o when able  - TTE showed increased LV wall thickness, bubble study was unsuccessful due to poor visualization. Will repeat.   - LE duplex negative for DVTs  - f/u hypercoagulable work up - results so far show decreased protein S activity, low factor II assay, high factor VIII assay  - neuro recs appreciated, will get heme consult for hypercoagulable w/u in a non-acute setting once pt. has been downgraded from the MICU    CARDIAC: h/o HTN, tachycardic/hypertensive persistently on unit   - goal SBP < 160  - holding cardine gtt, precedex dc/d  - labetalol increased to 400mg bid  - clonidine patch placed   - hydralazine 100 TID   - amlodipine 10 qd   - Patient w/ htn/tachycardia initially thought due to alcohol withdrawal. D'cd phenobarb with ativan PRN. Now suspicious for secondary hypertension, will get full w/u with renal artery duplex, metanephrines and renin.     RESPIRATORY  -on 2L NC PRN, sating well on RA.   -zosyn d'cd as per ID recs.     ID  - no longer febrile, WBC WNL  - Vanc for MRSA on blood cx.   - Zosyn d'cd as per ID recs   - Rpt BCx negative at 24 hours  -fu resp culture/gram stain    GI  -on tube feeds    ENDO  -no acute issues  - ISS; A1C 4.9    RENAL  -d/c hypertonic saline given that it is 72 hrs s/p stroke/24 hrs s/p last stable ct scan    HEME/ONC:  -DVT PPX- SCDs and start SQ heparin now 5 days s/p bleed      FOR FOLLOW UP:  -CT spine for osteo/discitis  -MR brain  -Secondary HTN w/u

## 2020-02-25 NOTE — PROGRESS NOTE ADULT - PROBLEM SELECTOR PLAN 2
on amlodipine and labetalol  Blood pressure will be monitored and if needed medications will be adjusted accordingly

## 2020-02-25 NOTE — PROGRESS NOTE ADULT - ASSESSMENT
40M w/HTN and ICH in September 2019 (with residual LUE and LLE weakness) transferred from  where he presented with AMS and unresponsiveness, intubated for airway protection, fd to have acute ICH. Repeat CTH stable. Required nicardipine gtt in ICU. MRSA bacteremia on Vanc. CT spine pending and MR brain pending.  ID on board. TTE negative. Needs repeat TTE vs JENNA if cx positive next week.

## 2020-02-25 NOTE — PROVIDER CONTACT NOTE (CRITICAL VALUE NOTIFICATION) - ACTION/TREATMENT ORDERED:
as per NP Orion golden as per NP Orion herzog vancomycin held. vancomycin to be restarted at 10AM 2/25/2020

## 2020-02-25 NOTE — SWALLOW BEDSIDE ASSESSMENT ADULT - ORAL PHASE
Delayed oral transit time/Decreased anterior-posterior movement of the bolus Decreased anterior-posterior movement of the bolus/suspect premature spillage suspect premature spillage

## 2020-02-25 NOTE — SWALLOW BEDSIDE ASSESSMENT ADULT - PHARYNGEAL PHASE
Within functional limits Delayed pharyngeal swallow/Cough post oral intake Delayed pharyngeal swallow/coughing with cup presentations

## 2020-02-25 NOTE — SWALLOW BEDSIDE ASSESSMENT ADULT - ASR SWALLOW ASPIRATION MONITOR
position upright (90Y)/oral hygiene/fever/pneumonia/throat clearing/upper respiratory infection/cough/gurgly voice/change of breathing pattern
change of breathing pattern/throat clearing/position upright (90Y)/cough/oral hygiene/fever/gurgly voice/pneumonia/upper respiratory infection
pneumonia/upper respiratory infection/position upright (90Y)/fever/throat clearing/oral hygiene/change of breathing pattern/cough/gurgly voice

## 2020-02-25 NOTE — SWALLOW BEDSIDE ASSESSMENT ADULT - COMMENTS
SLP reviewed chart, went to unit to attempt clinical swallow evaluation, however patient being transported for CT.  SLP informed RN, will attempt evaluation as schedule permits.
As per charting; 40 y/o M w/ Mhx HTN, T2DM, prior ETOH and cocaine abuse with history of recent R basal ganglia hemorrhagic stroke (with residual LUE and LLE weakness), L thalamic ischemic stroke, and R parietal density transferred from  where he presented with AMS/obtundation preceded by dizziness, HA, and worsening left sided weakness found to have ICH, intubated for airway protection w/ hospital course c/b LLE shaking presumed to be seizure requiring keppra and ativan. Patient was transferred to [facility] for neuro eval and EEG, now extubated on 2/20/20 and off sedation. CT showing stable hemorrhage, currently undergoing workup for hemorrhagic stroke etiology.  NEURO:  Hx of R basal ganglia hemorrhagic stroke, L thalamus ischemic stroke and R parietal density no p/w worsening mental status found to have 1.5 cm acute L thalamic IPH with vasogenic edema.    HCT: Abnormal high attenuation involving the left thalamic/posterior lenticular nucleus region is again identified. This finding is compatible with patient's known acute parenchymal hemorrhage. This finding measures approximately 1.6 x 1.5 cm and previously measured approximately 1.6 x 1.3 cm. Surrounding edema is again identified. Mass effect on the third ventricle and left lateral ventricle is again identified. Intraventricular hemorrhage is again seen though slightly diminished. This compatible areas with expected evolutionary changes.  Postop changes compatible with a craniotomy is seen involving the right parietal region. Encephalomalacia and gliosis involving the right basal ganglia/corona radiata region is again seen.    WBC: 18.54    RN at patient's bedside, reported patient has been having episodes of tachycardia followed by desaturations.  Patient's  and SPO2 is 88%, reports SPO2 baseline is ~94%.  She reports she provided medication to address heart rate and patient remains on precedex.
H&P: 40 y/o M w/ Mhx HTN, T2DM, prior ETOH and cocaine abuse with history of recent R basal ganglia hemorrhagic stroke (with residual LUE and LLE weakness), L thalamic ischemic stroke, and R parietal density transferred from  where he presented with AMS/obtundation preceded by dizziness, HA, and worsening left sided weakness found to have ICH, intubated for airway protection w/ hospital course c/b LLE shaking presumed to be seizure requiring keppra and ativan. Patient was transferred to [facility] for neuro eval and EEG, now extubated. CT showing stable hemorrhage, currently undergoing workup for hemorrhagic stroke etiology and being managed for possible EtOH withdrawal given persistent tachycardia/HTN despite max doses of nicardipine.    Clinical Bedside Swallow Evaluation completed on 2/21 (see note for details).    Patient seen upright at bedside with RN present. Patient received with eyes intermittently opening/ closing to verbal stimuli though responsive via gestures/ head nods to verbal questions. Patient unable to follow simple directions, though orally receptive to PO trials.
H&P: 38 y/o M w/ Mhx HTN, T2DM, prior ETOH and cocaine abuse with history of recent R basal ganglia hemorrhagic stroke (with residual LUE and LLE weakness), L thalamic ischemic stroke, and R parietal density transferred from  where he presented with AMS/obtundation preceded by dizziness, HA, and worsening left sided weakness found to have ICH, intubated for airway protection w/ hospital course c/b LLE shaking presumed to be seizure requiring keppra and ativan. Patient was transferred to [facility] for neuro eval and EEG, now extubated. CT showing stable hemorrhage, currently undergoing workup for hemorrhagic stroke etiology and being managed for possible EtOH withdrawal given persistent tachycardia/HTN despite max doses of nicardipine.    Clinical Bedside Swallow Evaluation completed on 2/21 (see note for details).  Clinical swallow evaluation completed on 2/23/20, see report for details.     Patient seen upright at bedside with father present. Patient received with eyes open, appearing awake and alert, inconsistently verbally responsive. Patient unable to follow simple directions, though orally receptive to PO trials.

## 2020-02-25 NOTE — PROGRESS NOTE ADULT - ASSESSMENT
Assessment: 40 year old male with PMHx of prior R BG infarct (with R on CT head at [facility]), HTN who presents as a transfer from [facility] for L thalamic bleed (approx 1.5 cm on 2 pm 2/18 scan with vasogenic edema) with IVH, CTA H&N unremarkable. He has a persistent L sided deficit at baseline, and lives with father. On day of presentation father noted that pt was not talking and became obtunded 5 min before EMS arrived. Prior to arrival to [facility], he was witnessed to have 1 GTC, and was intubated after. He is not on antithrombotic, and home meds include lisinopril, labetalol and amlodipine. No recent illnesses, no trauma. He was transferred to [facility] (no bed availability at [facility]) for vEEG and further management  ICH score 3, MRS unknown.   patient is now extubated out of the MICU, not on sedation, has NGT    Impression: L thalamic IPH with IVH likely 2/2 hypertension possibly in setting of cocaine use    Recommendations:  [ ] MRI brain w/w/o contrast pending  [ ] BP goal < 160/90  [x] c/w keppra 500 bid  [ ] TTE w/ bubble  [ ] LE doppler  [ ] a1c, LDL  [ ] PT/OT  [ ] speech/swallow    -Management & disposition discussed with Stroke attending, Dr. Jennings

## 2020-02-25 NOTE — SWALLOW BEDSIDE ASSESSMENT ADULT - NS ASR SWALLOW FINDINGS DISCUS
Nursing/SLP spoke with BRENDA Vo who was present, she reported she would inform MICU team during rounds
Patient/Physician/Nursing
Physician/Nursing/Patient

## 2020-02-25 NOTE — PROGRESS NOTE ADULT - SUBJECTIVE AND OBJECTIVE BOX
SUBJECTIVE: patient seen and examined at bedside by Neurology Resident and Attending. was transferred overnight from ICU to the floor.     MEDICATIONS (HOME):  Home Medications:  amLODIPine 10 mg oral tablet: 1 tab(s) orally once a day (19 Feb 2020 15:42)  atorvastatin 10 mg oral tablet: 1 tab(s) orally once a day (19 Feb 2020 15:42)  famotidine 20 mg oral tablet: 1 cap(s) orally 2 times a day (19 Feb 2020 15:42)  labetalol 200 mg oral tablet: 1 tab(s) orally 2 times a day (19 Feb 2020 15:42)    MEDICATIONS  (STANDING):  amitriptyline 50 milliGRAM(s) Oral daily  amLODIPine   Tablet 10 milliGRAM(s) Oral daily  atorvastatin 10 milliGRAM(s) Oral at bedtime  chlorhexidine 0.12% Liquid 15 milliLiter(s) Oral Mucosa two times a day  cloNIDine Patch 0.1 mG/24Hr(s) 1 patch Transdermal every 7 days  famotidine   Suspension 20 milliGRAM(s) Oral daily  heparin  Injectable 5000 Unit(s) SubCutaneous every 8 hours  hydrALAZINE 100 milliGRAM(s) Oral three times a day  labetalol 400 milliGRAM(s) Oral every 8 hours  levETIRAcetam  Solution 1000 milliGRAM(s) Oral two times a day  OLANZapine 5 milliGRAM(s) Oral at bedtime  vancomycin  IVPB 750 milliGRAM(s) IV Intermittent <User Schedule>    MEDICATIONS  (PRN):  hydrALAZINE Injectable 10 milliGRAM(s) IV Push once PRN hypertension SBP>180    VITALS & EXAMINATION:  Vital Signs Last 24 Hrs  T(C): 36.8 (25 Feb 2020 05:26), Max: 37.8 (24 Feb 2020 16:00)  T(F): 98.2 (25 Feb 2020 05:26), Max: 100.1 (24 Feb 2020 16:00)  HR: 98 (25 Feb 2020 05:26) (78 - 108)  BP: 170/118 (25 Feb 2020 05:26) (137/86 - 177/118)  BP(mean): 112 (24 Feb 2020 23:00) (98 - 135)  RR: 17 (25 Feb 2020 05:26) (17 - 34)  SpO2: 99% (25 Feb 2020 05:26) (98% - 100%)    Neurological (>12):  MS: eyes open with left gaze preference, eyes can cross midline upon EOM testing, can whisper words and knows he is in hospital, can show us 2 fingers when asked, demonstrates random moaning though denies pain ?pseudobulbar affect    CNs: PERRL (5mm OD, OS) VFF. EOMI no nystagmus. V1-3 intact to LT/pinprick. severe left facial droop with forehead involvement (baseline). Hearing grossly normal (rubbing fingers) b/l.     Motor: left arm and leg plegic (baseline); can lift right arm to antigravity though has difficulty maintaining, right leg can move in horizontal plane	     Sensation: Intact to LT b/l throughout.     LABORATORY:  CBC                       11.8   8.36  )-----------( 275      ( 25 Feb 2020 06:10 )             34.4     Chem 02-25    140  |  105  |  14  ----------------------------<  132<H>  3.3<L>   |  21<L>  |  1.17    Ca    9.8      25 Feb 2020 06:10  Phos  3.6     02-25  Mg     2.2     02-25    TPro  7.4  /  Alb  3.8  /  TBili  0.3  /  DBili  x   /  AST  10  /  ALT  14  /  AlkPhos  90  02-25    LFTs LIVER FUNCTIONS - ( 25 Feb 2020 06:10 )  Alb: 3.8 g/dL / Pro: 7.4 g/dL / ALK PHOS: 90 u/L / ALT: 14 u/L / AST: 10 u/L / GGT: x           STUDIES & IMAGING:  Studies (EKG, EEG, EMG, etc):     Radiology (XR, CT, MR, U/S, TTE/JENNA):    < from: CT Head No Cont (02.22.20 @ 17:37) >  IMPRESSION: No change since 2/19/2020. 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 Old right basal ganglia infarct with vacuo dilatation of the right lateral ventricle.    < end of copied text >

## 2020-02-25 NOTE — PROGRESS NOTE ADULT - PROBLEM SELECTOR PLAN 1
Hx of R basal ganglia hemorrhagic stroke, L thalamus ischemic stroke and R parietal density no p/w worsening mental status found to have 1.5 cm acute L thalamic IPH with vasogenic edema  - 24h head CT showed stable hemorrhage  - elevated HOB 30 degrees   - keppra increased to1g bid per neuro recs  - Goal SBP <160  - labetalol increased to 400mg BID  - q4h neuro checks  - EEG d'cd: no seizure seen on EEG, but noted to have potential epileptogenic focus in the right parietal region, cortical dysfunction and structural abnormality in the right hemisphere, and mild nonspecific diffuse or multifocal cerebral dysfunction  - MRI brain w, w/o pending  - TTE showed increased LV wall thickness, bubble study was unsuccessful due to poor visualization.   - LE duplex negative for DVTs  - f/u hypercoagulable work up - results so far show decreased protein S activity, low factor II assay, high factor VIII assay

## 2020-02-25 NOTE — PROGRESS NOTE ADULT - SUBJECTIVE AND OBJECTIVE BOX
40y old  Male who presents with a chief complaint of unresponsiveness (25 Feb 2020 17:14)      Interval history:  Afebrile, has not slept for last few nights, no headaches, no cough.       Allergies:   No Known Allergies    Antimicrobials:  vancomycin  IVPB 750 milliGRAM(s) IV Intermittent <User Schedule>      REVIEW OF SYSTEMS:  No chest pain   No abdominal pain  No rash.       Vital Signs Last 24 Hrs  T(C): 36.4 (02-25-20 @ 13:00), Max: 37.7 (02-24-20 @ 20:00)  T(F): 97.6 (02-25-20 @ 13:00), Max: 99.8 (02-24-20 @ 20:00)  HR: 91 (02-25-20 @ 14:00) (78 - 108)  BP: 152/81 (02-25-20 @ 14:00) (137/86 - 177/118)  BP(mean): 112 (02-24-20 @ 23:00) (98 - 135)  RR: 18 (02-25-20 @ 13:00) (17 - 31)  SpO2: 100% (02-25-20 @ 13:00) (98% - 100%)      PHYSICAL EXAM:  Patient in no acute distress. Alert, awake.   + NG tube  Cardiovascular: S1S2 normal.  Lungs: + air entry B/L lung fields, coarse rales scattered.   Gastrointestinal: soft, nontender, nondistended.  Extremities: no edema.  IV sites not inflamed.                           11.8   8.36  )-----------( 275      ( 25 Feb 2020 06:10 )             34.4   02-25    140  |  105  |  14  ----------------------------<  132<H>  3.3<L>   |  21<L>  |  1.17    Ca    9.8      25 Feb 2020 06:10  Phos  3.6     02-25  Mg     2.2     02-25    TPro  7.4  /  Alb  3.8  /  TBili  0.3  /  DBili  x   /  AST  10  /  ALT  14  /  AlkPhos  90  02-25      LIVER FUNCTIONS - ( 25 Feb 2020 06:10 )  Alb: 3.8 g/dL / Pro: 7.4 g/dL / ALK PHOS: 90 u/L / ALT: 14 u/L / AST: 10 u/L / GGT: x               Culture - Blood (collected 23 Feb 2020 03:18)  Source: BLOOD PERIPHERAL  Preliminary Report (25 Feb 2020 03:17):    NO ORGANISMS ISOLATED    NO ORGANISMS ISOLATED AT 48 HRS.    Culture - Blood (collected 22 Feb 2020 21:54)  Source: BLOOD PERIPHERAL  Preliminary Report (24 Feb 2020 21:53):    NO ORGANISMS ISOLATED    NO ORGANISMS ISOLATED AT 48 HRS.      Radiology:  < from: CT Lumbar Spine w/ IV Cont (02.25.20 @ 13:07) >  Impression: Degenerative changes as described above.

## 2020-02-25 NOTE — SWALLOW BEDSIDE ASSESSMENT ADULT - SWALLOW EVAL: DIAGNOSIS
Patient not responsive to utensil cue to oral cavity, not safe to provide further PO trials.
1. Functional oral phase for puree consistency and honey thick liquids via teaspoon marked by adequate stripping of bolus from utensil, adequate bolus manipulation and functional oral transit time 2. Moderate oral phase dysphagia for thin liquids marked by reduced labial seal with lateral loss, reduced bolus manipulation and suspected posterior loss 3. Functional pharyngeal phase for puree consistency and honey thick liquids marked by adequate laryngeal elevation upon digital palpation with NO overt s/s of laryngeal penetration/aspiration 4. Moderate pharyngeal phase dysphagia for thin liquids marked by suspected delay in initiation of pharyngeal swallow with immediate evidence of cough response subsequent deglutition indicative of laryngeal penetration/aspiration.
Patient consumed trials of puree, honey thick liquids, nectar thick liquids and thin liquids presenting with a moderate to severe oropharyngeal dysphagia.  Oral phase characterized by reduced ability to regulate amount/flow from cup, adequate bolus manipulation, suspect reduced bolus control, suspect premature spillage due to audible swallow with liquid trials, suspect reduced anterior to posterior transport, no oral cavity residue post swallow.  Pharyngeal phase characterized by suspect delayed initiation of pharyngeal swallow, hyolaryngeal elevation and excursion noted upon palpation, coughing following trials of thin liquids and honey thick liquids via cup indicative of aspiration, no signs or symptoms of aspiration across trials of puree, honey thick liquids via teaspoon or nectar thick liquids via teaspoon.

## 2020-02-25 NOTE — SWALLOW BEDSIDE ASSESSMENT ADULT - ADDITIONAL RECOMMENDATIONS
Oral care 3x a day to prevent colonization of oral bacteria  Consider reconsulting this service for a clinical bedside swallow evaluation when patient's mentation has improved
1. Monitor for PO tolerance/intake  2. Monitor for any change in neurological status that may impact oral intake   3. Consider Nutrition Consultation given PO intake may be guarded to meet adequate caloric/ hydration needs   4. Reconsult this service when overall medical status improves to assess for possible diet advancement
1. Monitor for PO tolerance/intake  2. Monitor for any change in neurological status that may impact oral intake   3. Consider Nutrition Consultation given PO intake may be guarded to meet adequate caloric/ hydration needs   4. Reconsult this service when overall medical status improves to assess for possible diet advancement

## 2020-02-25 NOTE — PROGRESS NOTE ADULT - ASSESSMENT
41 y/o M PMHx DMII, reported polysubstance abuse ( alcohol and cocaine), previous ICH in september 2019 with residual left sided weakness was transferred from [facility] after episode of unresponsiveness leading to diagnosis of new hemorrhagic CVA, now with persistent fevers, leukocytosis with bandemia, and MRSA bacteremia.    sepsis, fever, leucocytosis, tachycardia, due to MRSA Bacteremia, possible source phlebitis.   resolved sepsis, no fever or leucocytosis,      Plan:   -Bcx 1 of 4 (aerobic) positive for MRSA.   -Unclear source, possible phlebitis rt arm.   -CT with no obvious spinal involvement.   - repeat TTE with no obvious vegetation  -c/w Vanco for MRSA pneumonia, trough elevated, decreased dose to 750 mg q8h   -monitor vancomycin trough and creatinine to avoid nephrotoxicity and ensure efficacy   -repeat blood cx NTD.   -will eventually need PICC

## 2020-02-25 NOTE — PROGRESS NOTE ADULT - SUBJECTIVE AND OBJECTIVE BOX
PROGRESS NOTE:     Patient is a 40y old  Male who presents with a chief complaint of ICH (24 Feb 2020 07:29)      SUBJECTIVE / OVERNIGHT EVENTS:  Patient seen and examined this morning with his father at bedside. No overnight events, denies any new symptoms    ADDITIONAL REVIEW OF SYSTEMS:  No fevers or chills.    MEDICATIONS  (STANDING):  amitriptyline 50 milliGRAM(s) Oral daily  amLODIPine   Tablet 10 milliGRAM(s) Oral daily  atorvastatin 10 milliGRAM(s) Oral at bedtime  chlorhexidine 0.12% Liquid 15 milliLiter(s) Oral Mucosa two times a day  cloNIDine Patch 0.1 mG/24Hr(s) 1 patch Transdermal every 7 days  famotidine   Suspension 20 milliGRAM(s) Oral daily  heparin  Injectable 5000 Unit(s) SubCutaneous every 8 hours  hydrALAZINE 100 milliGRAM(s) Oral three times a day  labetalol 400 milliGRAM(s) Oral every 8 hours  levETIRAcetam  Solution 1000 milliGRAM(s) Oral two times a day  OLANZapine 5 milliGRAM(s) Oral at bedtime  vancomycin  IVPB 750 milliGRAM(s) IV Intermittent <User Schedule>    MEDICATIONS  (PRN):  hydrALAZINE Injectable 10 milliGRAM(s) IV Push once PRN hypertension SBP>180      CAPILLARY BLOOD GLUCOSE      POCT Blood Glucose.: 108 mg/dL (25 Feb 2020 04:21)    I&O's Summary    24 Feb 2020 07:01  -  25 Feb 2020 07:00  --------------------------------------------------------  IN: 1320 mL / OUT: 1120 mL / NET: 200 mL        PHYSICAL EXAM:  Vital Signs Last 24 Hrs  T(C): 36.4 (25 Feb 2020 13:00), Max: 37.7 (24 Feb 2020 20:00)  T(F): 97.6 (25 Feb 2020 13:00), Max: 99.8 (24 Feb 2020 20:00)  HR: 91 (25 Feb 2020 14:00) (78 - 108)  BP: 152/81 (25 Feb 2020 14:00) (137/86 - 177/118)  BP(mean): 112 (24 Feb 2020 23:00) (98 - 135)  RR: 18 (25 Feb 2020 13:00) (17 - 31)  SpO2: 100% (25 Feb 2020 13:00) (98% - 100%)    CONSTITUTIONAL: NAD, well-developed, pleasant  RESPIRATORY: Normal respiratory effort; lungs are clear to auscultation bilaterally  CARDIOVASCULAR: Regular rate and rhythm, normal S1 and S2, no murmur/rub/gallop; No lower extremity edema; Peripheral pulses are 2+ bilaterally  ABDOMEN: Nontender to palpation, normoactive bowel sounds, no rebound/guarding; No hepatosplenomegaly  MUSCLOSKELETAL: no clubbing or cyanosis of digits; no joint swelling or tenderness to palpation  PSYCH: A+O to person, place, and time; affect appropriate    LABS:                        11.8   8.36  )-----------( 275      ( 25 Feb 2020 06:10 )             34.4     02-25    140  |  105  |  14  ----------------------------<  132<H>  3.3<L>   |  21<L>  |  1.17    Ca    9.8      25 Feb 2020 06:10  Phos  3.6     02-25  Mg     2.2     02-25    TPro  7.4  /  Alb  3.8  /  TBili  0.3  /  DBili  x   /  AST  10  /  ALT  14  /  AlkPhos  90  02-25              Culture - Blood (collected 23 Feb 2020 03:18)  Source: BLOOD PERIPHERAL  Preliminary Report (25 Feb 2020 03:17):    NO ORGANISMS ISOLATED    NO ORGANISMS ISOLATED AT 48 HRS.    Culture - Blood (collected 22 Feb 2020 21:54)  Source: BLOOD PERIPHERAL  Preliminary Report (24 Feb 2020 21:53):    NO ORGANISMS ISOLATED    NO ORGANISMS ISOLATED AT 48 HRS.        RADIOLOGY & ADDITIONAL TESTS:  Results Reviewed:   Imaging Personally Reviewed:  Electrocardiogram Personally Reviewed:    COORDINATION OF CARE:  Care Discussed with Consultants/Other Providers [Y/N]:  Prior or Outpatient Records Reviewed [Y/N]:

## 2020-02-26 LAB
ANION GAP SERPL CALC-SCNC: 15 MMO/L — HIGH (ref 7–14)
BACTERIA BLD CULT: SIGNIFICANT CHANGE UP
BUN SERPL-MCNC: 15 MG/DL — SIGNIFICANT CHANGE UP (ref 7–23)
CALCIUM SERPL-MCNC: 9.6 MG/DL — SIGNIFICANT CHANGE UP (ref 8.4–10.5)
CHLORIDE SERPL-SCNC: 102 MMOL/L — SIGNIFICANT CHANGE UP (ref 98–107)
CO2 SERPL-SCNC: 22 MMOL/L — SIGNIFICANT CHANGE UP (ref 22–31)
CREAT SERPL-MCNC: 1.13 MG/DL — SIGNIFICANT CHANGE UP (ref 0.5–1.3)
GLUCOSE SERPL-MCNC: 123 MG/DL — HIGH (ref 70–99)
HCT VFR BLD CALC: 34.5 % — LOW (ref 39–50)
HGB BLD-MCNC: 11.8 G/DL — LOW (ref 13–17)
MAGNESIUM SERPL-MCNC: 2.2 MG/DL — SIGNIFICANT CHANGE UP (ref 1.6–2.6)
MCHC RBC-ENTMCNC: 29.7 PG — SIGNIFICANT CHANGE UP (ref 27–34)
MCHC RBC-ENTMCNC: 34.2 % — SIGNIFICANT CHANGE UP (ref 32–36)
MCV RBC AUTO: 86.9 FL — SIGNIFICANT CHANGE UP (ref 80–100)
NRBC # FLD: 0 K/UL — SIGNIFICANT CHANGE UP (ref 0–0)
PHOSPHATE SERPL-MCNC: 3.2 MG/DL — SIGNIFICANT CHANGE UP (ref 2.5–4.5)
PLATELET # BLD AUTO: 289 K/UL — SIGNIFICANT CHANGE UP (ref 150–400)
PMV BLD: 10 FL — SIGNIFICANT CHANGE UP (ref 7–13)
POTASSIUM SERPL-MCNC: 3.6 MMOL/L — SIGNIFICANT CHANGE UP (ref 3.5–5.3)
POTASSIUM SERPL-SCNC: 3.6 MMOL/L — SIGNIFICANT CHANGE UP (ref 3.5–5.3)
RBC # BLD: 3.97 M/UL — LOW (ref 4.2–5.8)
RBC # FLD: 13 % — SIGNIFICANT CHANGE UP (ref 10.3–14.5)
SODIUM SERPL-SCNC: 139 MMOL/L — SIGNIFICANT CHANGE UP (ref 135–145)
VANCOMYCIN TROUGH SERPL-MCNC: 12.5 UG/ML — SIGNIFICANT CHANGE UP (ref 10–20)
VANCOMYCIN TROUGH SERPL-MCNC: 22.9 UG/ML — HIGH (ref 10–20)
WBC # BLD: 8.68 K/UL — SIGNIFICANT CHANGE UP (ref 3.8–10.5)
WBC # FLD AUTO: 8.68 K/UL — SIGNIFICANT CHANGE UP (ref 3.8–10.5)

## 2020-02-26 PROCEDURE — 99232 SBSQ HOSP IP/OBS MODERATE 35: CPT

## 2020-02-26 PROCEDURE — 99233 SBSQ HOSP IP/OBS HIGH 50: CPT

## 2020-02-26 PROCEDURE — 99223 1ST HOSP IP/OBS HIGH 75: CPT

## 2020-02-26 RX ORDER — AMLODIPINE BESYLATE 2.5 MG/1
10 TABLET ORAL DAILY
Refills: 0 | Status: DISCONTINUED | OUTPATIENT
Start: 2020-02-26 | End: 2020-03-10

## 2020-02-26 RX ORDER — AMITRIPTYLINE HCL 25 MG
50 TABLET ORAL DAILY
Refills: 0 | Status: DISCONTINUED | OUTPATIENT
Start: 2020-02-26 | End: 2020-03-10

## 2020-02-26 RX ORDER — FAMOTIDINE 10 MG/ML
20 INJECTION INTRAVENOUS DAILY
Refills: 0 | Status: DISCONTINUED | OUTPATIENT
Start: 2020-02-26 | End: 2020-03-10

## 2020-02-26 RX ORDER — HYDRALAZINE HCL 50 MG
100 TABLET ORAL THREE TIMES A DAY
Refills: 0 | Status: DISCONTINUED | OUTPATIENT
Start: 2020-02-26 | End: 2020-03-10

## 2020-02-26 RX ORDER — MORPHINE SULFATE 50 MG/1
2 CAPSULE, EXTENDED RELEASE ORAL EVERY 6 HOURS
Refills: 0 | Status: DISCONTINUED | OUTPATIENT
Start: 2020-02-26 | End: 2020-02-27

## 2020-02-26 RX ORDER — ATORVASTATIN CALCIUM 80 MG/1
10 TABLET, FILM COATED ORAL AT BEDTIME
Refills: 0 | Status: DISCONTINUED | OUTPATIENT
Start: 2020-02-26 | End: 2020-03-10

## 2020-02-26 RX ORDER — LEVETIRACETAM 250 MG/1
1000 TABLET, FILM COATED ORAL
Refills: 0 | Status: DISCONTINUED | OUTPATIENT
Start: 2020-02-26 | End: 2020-02-28

## 2020-02-26 RX ORDER — LABETALOL HCL 100 MG
400 TABLET ORAL EVERY 8 HOURS
Refills: 0 | Status: DISCONTINUED | OUTPATIENT
Start: 2020-02-26 | End: 2020-03-10

## 2020-02-26 RX ORDER — VANCOMYCIN HCL 1 G
1000 VIAL (EA) INTRAVENOUS
Refills: 0 | Status: DISCONTINUED | OUTPATIENT
Start: 2020-02-26 | End: 2020-03-02

## 2020-02-26 RX ORDER — LANOLIN ALCOHOL/MO/W.PET/CERES
3 CREAM (GRAM) TOPICAL AT BEDTIME
Refills: 0 | Status: DISCONTINUED | OUTPATIENT
Start: 2020-02-26 | End: 2020-03-10

## 2020-02-26 RX ORDER — OLANZAPINE 15 MG/1
5 TABLET, FILM COATED ORAL AT BEDTIME
Refills: 0 | Status: DISCONTINUED | OUTPATIENT
Start: 2020-02-26 | End: 2020-03-10

## 2020-02-26 RX ADMIN — Medication 100 MILLIGRAM(S): at 05:20

## 2020-02-26 RX ADMIN — Medication 250 MILLIGRAM(S): at 01:13

## 2020-02-26 RX ADMIN — Medication 100 MILLIGRAM(S): at 13:20

## 2020-02-26 RX ADMIN — ATORVASTATIN CALCIUM 10 MILLIGRAM(S): 80 TABLET, FILM COATED ORAL at 22:32

## 2020-02-26 RX ADMIN — Medication 1 PATCH: at 19:02

## 2020-02-26 RX ADMIN — Medication 50 MILLIGRAM(S): at 13:21

## 2020-02-26 RX ADMIN — HEPARIN SODIUM 5000 UNIT(S): 5000 INJECTION INTRAVENOUS; SUBCUTANEOUS at 05:22

## 2020-02-26 RX ADMIN — CHLORHEXIDINE GLUCONATE 15 MILLILITER(S): 213 SOLUTION TOPICAL at 17:26

## 2020-02-26 RX ADMIN — HEPARIN SODIUM 5000 UNIT(S): 5000 INJECTION INTRAVENOUS; SUBCUTANEOUS at 13:21

## 2020-02-26 RX ADMIN — Medication 400 MILLIGRAM(S): at 05:20

## 2020-02-26 RX ADMIN — FAMOTIDINE 20 MILLIGRAM(S): 10 INJECTION INTRAVENOUS at 13:22

## 2020-02-26 RX ADMIN — Medication 400 MILLIGRAM(S): at 13:21

## 2020-02-26 RX ADMIN — Medication 250 MILLIGRAM(S): at 23:47

## 2020-02-26 RX ADMIN — CHLORHEXIDINE GLUCONATE 15 MILLILITER(S): 213 SOLUTION TOPICAL at 05:20

## 2020-02-26 RX ADMIN — MORPHINE SULFATE 2 MILLIGRAM(S): 50 CAPSULE, EXTENDED RELEASE ORAL at 12:00

## 2020-02-26 RX ADMIN — AMLODIPINE BESYLATE 10 MILLIGRAM(S): 2.5 TABLET ORAL at 05:20

## 2020-02-26 RX ADMIN — LEVETIRACETAM 1000 MILLIGRAM(S): 250 TABLET, FILM COATED ORAL at 05:29

## 2020-02-26 RX ADMIN — Medication 400 MILLIGRAM(S): at 22:32

## 2020-02-26 RX ADMIN — LEVETIRACETAM 1000 MILLIGRAM(S): 250 TABLET, FILM COATED ORAL at 17:26

## 2020-02-26 RX ADMIN — MORPHINE SULFATE 2 MILLIGRAM(S): 50 CAPSULE, EXTENDED RELEASE ORAL at 12:15

## 2020-02-26 RX ADMIN — Medication 100 MILLIGRAM(S): at 22:32

## 2020-02-26 RX ADMIN — HEPARIN SODIUM 5000 UNIT(S): 5000 INJECTION INTRAVENOUS; SUBCUTANEOUS at 22:32

## 2020-02-26 RX ADMIN — Medication 1 PATCH: at 06:18

## 2020-02-26 RX ADMIN — OLANZAPINE 5 MILLIGRAM(S): 15 TABLET, FILM COATED ORAL at 22:32

## 2020-02-26 NOTE — PROGRESS NOTE ADULT - ASSESSMENT
41 y/o M PMHx DMII, reported polysubstance abuse ( alcohol and cocaine), previous ICH in september 2019 with residual left sided weakness was transferred from [facility] after episode of unresponsiveness leading to diagnosis of new hemorrhagic CVA, now with persistent fevers, leukocytosis with bandemia, and MRSA bacteremia.    sepsis, fever, leucocytosis, tachycardia, due to MRSA Bacteremia, possible source phlebitis.   resolved sepsis, no fever or leucocytosis,      Plan:   -Bcx 1 of 4 (aerobic) positive for MRSA.   -Unclear source, possible phlebitis rt arm.   -CT with no obvious spinal involvement.   - repeat TTE with no obvious vegetation  -c/w Vanco for MRSA pneumonia, trough elevated, decreased dose to 750 mg q8h, recheck trough in am   -monitor vancomycin trough and creatinine to avoid nephrotoxicity and ensure efficacy   -repeat blood cx NTD.   -will eventually need PICC   -neuro following 39 y/o M PMHx DMII, reported polysubstance abuse ( alcohol and cocaine), previous ICH in september 2019 with residual left sided weakness [facility]responsiveness leading to diagnosis of new hemorrhagic CVA, now with persistent fevers, leukocytosis with bandemia, and MRSA bacteremia.    sepsis, fever, leucocytosis, tachycardia, due to MRSA Bacteremia, possible source phlebitis.   resolved sepsis, no fever or leucocytosis,      Plan:   -Bcx 1 of 4 (aerobic) positive for MRSA.   -Unclear source, possible phlebitis rt arm.   -CT with no obvious spinal involvement.   - repeat TTE with no obvious vegetation  -c/w Vanco for MRSA pneumonia, trough elevated today too at 22, decrease dose to 1 gm iv q12h   -monitor vancomycin trough and creatinine to avoid nephrotoxicity and ensure efficacy   -repeat blood cx NTD.   -will eventually need PICC   -neuro following

## 2020-02-26 NOTE — PROGRESS NOTE ADULT - SUBJECTIVE AND OBJECTIVE BOX
PROGRESS NOTE:     Patient is a 40y old  Male who presents with a chief complaint of ICH (26 Feb 2020 11:42)      SUBJECTIVE / OVERNIGHT EVENTS:  Patient seen and examined this morning with his brother at bedside. Reports worsening back pain as well as lack of sleep overnight.     ADDITIONAL REVIEW OF SYSTEMS:  No fevers or chills.     MEDICATIONS  (STANDING):  amitriptyline 50 milliGRAM(s) Oral daily  amLODIPine   Tablet 10 milliGRAM(s) Oral daily  atorvastatin 10 milliGRAM(s) Oral at bedtime  chlorhexidine 0.12% Liquid 15 milliLiter(s) Oral Mucosa two times a day  cloNIDine Patch 0.1 mG/24Hr(s) 1 patch Transdermal every 7 days  famotidine   Suspension 20 milliGRAM(s) Oral daily  heparin  Injectable 5000 Unit(s) SubCutaneous every 8 hours  hydrALAZINE 100 milliGRAM(s) Oral three times a day  labetalol 400 milliGRAM(s) Oral every 8 hours  levETIRAcetam  Solution 1000 milliGRAM(s) Oral two times a day  melatonin 3 milliGRAM(s) Oral at bedtime  OLANZapine 5 milliGRAM(s) Oral at bedtime  vancomycin  IVPB 1000 milliGRAM(s) IV Intermittent <User Schedule>    MEDICATIONS  (PRN):  hydrALAZINE Injectable 10 milliGRAM(s) IV Push once PRN hypertension SBP>180  morphine  - Injectable 2 milliGRAM(s) IV Push every 6 hours PRN Severe Pain (7 - 10)      CAPILLARY BLOOD GLUCOSE        I&O's Summary    25 Feb 2020 07:01  -  26 Feb 2020 07:00  --------------------------------------------------------  IN: 0 mL / OUT: 400 mL / NET: -400 mL        PHYSICAL EXAM:  Vital Signs Last 24 Hrs  T(C): 36.9 (26 Feb 2020 12:11), Max: 36.9 (26 Feb 2020 12:11)  T(F): 98.4 (26 Feb 2020 12:11), Max: 98.4 (26 Feb 2020 12:11)  HR: 90 (26 Feb 2020 13:19) (78 - 93)  BP: 145/92 (26 Feb 2020 13:19) (137/79 - 165/103)  BP(mean): --  RR: 16 (26 Feb 2020 13:19) (16 - 19)  SpO2: 100% (26 Feb 2020 13:19) (98% - 100%)    CONSTITUTIONAL: NAD, well-developed, pleasant  RESPIRATORY: Normal respiratory effort; lungs are clear to auscultation bilaterally  CARDIOVASCULAR: Regular rate and rhythm, normal S1 and S2, no murmur/rub/gallop; No lower extremity edema; Peripheral pulses are 2+ bilaterally  ABDOMEN: Nontender to palpation, normoactive bowel sounds, no rebound/guarding; No hepatosplenomegaly  MUSCLOSKELETAL: no clubbing or cyanosis of digits; no joint swelling or tenderness to palpation  PSYCH: A+O to person, place, and time; affect appropriate      LABS:                        11.8   8.68  )-----------( 289      ( 26 Feb 2020 05:45 )             34.5     02-26    139  |  102  |  15  ----------------------------<  123<H>  3.6   |  22  |  1.13    Ca    9.6      26 Feb 2020 05:45  Phos  3.2     02-26  Mg     2.2     02-26    TPro  7.4  /  Alb  3.8  /  TBili  0.3  /  DBili  x   /  AST  10  /  ALT  14  /  AlkPhos  90  02-25                RADIOLOGY & ADDITIONAL TESTS:  Results Reviewed:   Imaging Personally Reviewed: EXAM:  CT LUMBAR SPINE IC      EXAM:  CT THORACIC SPINE IC        PROCEDURE DATE:  Feb 25 2020         INTERPRETATION:  Clinical indication: Back pain.    Multiple axial sections were performed through the thoracic lumbar spine region. Coronal and sagittal reconstructions were performed as well.    Scoliosis is seen.    Increased kyphosis of the thoracic spine is seen.    Loss of the normal lumbar lordosis is seen.    T7-8: Disc bulge is identified. Minimal effacement of the ventral thecal sac is seen. No significant compromise of the spinal canal or either neural foramen    L4-5: Disc bulge and bilateral hypertrophic facet joint changes seen. No significant compromise of the spinal canal or either neural foramen    L5-S1: Disc bulge and bilateral hypertrophic facet joint changes are seen. No significant compromise of the spinal canal either neural foramen    No acute fractures or dislocations seen.    Evaluation of the paraspinal soft tissues is limited due to lack of IV contrast though grossly unremarkable    The visualized portion of both lungs appear clear.    NG tube is seen.    Impression: Degenerative changes as described above.          Electrocardiogram Personally Reviewed:    COORDINATION OF CARE:  Care Discussed with Consultants/Other Providers [Y/N]:  Prior or Outpatient Records Reviewed [Y/N]:

## 2020-02-26 NOTE — PROGRESS NOTE ADULT - SUBJECTIVE AND OBJECTIVE BOX
40y old  Male who presents with a chief complaint of ICH (26 Feb 2020 11:42)      Interval history:  Afebrile, did not sleep last night, denies headache, no cough.       Allergies:   No Known Allergies      Antimicrobials:  vancomycin  IVPB 750 milliGRAM(s) IV Intermittent <User Schedule>      REVIEW OF SYSTEMS:  No chest pain   No SOB  No N/V  No rash.       Vital Signs Last 24 Hrs  T(C): 36.9 (02-26-20 @ 12:11), Max: 36.9 (02-26-20 @ 12:11)  T(F): 98.4 (02-26-20 @ 12:11), Max: 98.4 (02-26-20 @ 12:11)  HR: 93 (02-26-20 @ 12:11) (78 - 93)  BP: 144/93 (02-26-20 @ 12:11) (137/79 - 165/103)  BP(mean): --  RR: 17 (02-26-20 @ 12:11) (17 - 19)  SpO2: 98% (02-26-20 @ 12:11) (98% - 99%)      PHYSICAL EXAM:  Patient in no acute distress. Alert, awake.   + NG tube  Cardiovascular: S1S2 normal.  Lungs: + air entry B/L lung fields, coarse rales scattered.   Gastrointestinal: soft, nontender, nondistended.  Extremities: no edema.  IV sites not inflamed.                             11.8   8.68  )-----------( 289      ( 26 Feb 2020 05:45 )             34.5   02-26    139  |  102  |  15  ----------------------------<  123<H>  3.6   |  22  |  1.13    Ca    9.6      26 Feb 2020 05:45  Phos  3.2     02-26  Mg     2.2     02-26    TPro  7.4  /  Alb  3.8  /  TBili  0.3  /  DBili  x   /  AST  10  /  ALT  14  /  AlkPhos  90  02-25      LIVER FUNCTIONS - ( 25 Feb 2020 06:10 )  Alb: 3.8 g/dL / Pro: 7.4 g/dL / ALK PHOS: 90 u/L / ALT: 14 u/L / AST: 10 u/L / GGT: x             Culture - Blood in AM (02.23.20 @ 03:18)    Culture - Blood:   NO ORGANISMS ISOLATED  NO ORGANISMS ISOLATED AT 72 HRS.    Specimen Source: BLOOD PERIPHERAL

## 2020-02-26 NOTE — CONSULT NOTE ADULT - SUBJECTIVE AND OBJECTIVE BOX
HPI:  39 yo M c PMH of HTN and ?ICH in September 2019 (with residual LUE and LLE weakness) transferred from  where he presented with AMS and unresponsiveness. per father, pt was his normal self (A&Ox3, talking normally, had LUE and LLE weakness that was at his baseline) all morning aside from having a HA this morning. At 1:15PM, pt told his father he had severe dizziness, then pt could not move his LUE and LLE at all (worse than baseline), then lost consciousness. father denies seizure activity. LKN 1:10PM today. At , code stroke was called on arrival, NIHSS 41, pt was obtunded and intubated. CT showed: "acute parenchymal hematoma in the left thalamus. It measures approximately 1.5 cm in diameter with mild associated vasogenic edema. There is mild left-sided intraventricular hemorrhage. There is no ventricular trapping at this time. There is moderate to advanced right basal ganglia and to a laminectomy encephalomalacia consistent with previous infarct or hemorrhage with an associated craniotomy defect." CTA head and neck showed "normal arterial findings." no tPA given. pt's obtunded state was OOP to extent of his bleed and per  note pt had LLE shaking, so seizure was presumed and pt loaded with keppra, given ativan, started on propofol drip, and sent to [facility] ED. not on bloodthinners. no recent travel/ill contacts. Father states pt had intracranial bleeding in September and pt had neurosurgery but father isn't sure what kind. all information should be at [facility]. no FH of kidney disease, ICH, SLE.     Patient admitted to MICU on 2/18, was seen by neurology, neurosurgery, repeat CT Head stable, extubated on 2/20, +MRSA bacteremia, transferred to floor on 2/25. Today, patient's family at bedside, report patient has not slept in days, has back pain. Patient denies pain, headache, follows some commands.     REVIEW OF SYSTEMS  Constitutional - No fever, No weight loss, No fatigue  HEENT - No eye pain, No visual disturbances, No difficulty hearing, No tinnitus, No vertigo, No neck pain  Respiratory - No cough, No wheezing, No shortness of breath  Cardiovascular - No chest pain, No palpitations  Gastrointestinal - No abdominal pain, No nausea, No vomiting, No diarrhea, No constipation  Genitourinary - +duke  Neurological - No headaches, No memory loss, + loss of strength, increased tone  Skin - No itching, No rashes, No lesions   Endocrine - No temperature intolerance  Musculoskeletal - No joint pain, No joint swelling, No muscle pain  Psychiatric - No depression, No anxiety    VITALS  T(C): 36.6 (02-26-20 @ 05:17), Max: 36.6 (02-25-20 @ 21:53)  HR: 80 (02-26-20 @ 05:17) (78 - 92)  BP: 157/86 (02-26-20 @ 05:17) (137/79 - 165/103)  RR: 18 (02-26-20 @ 05:17) (17 - 19)  SpO2: 98% (02-26-20 @ 05:17) (98% - 100%)  Wt(kg): --    PAST MEDICAL & SURGICAL HISTORY  CVA (cerebrovascular accident)  HTN (hypertension)  H/O craniotomy      SOCIAL HISTORY  Smoking - Denied  EtOH - Denied   Drugs - Denied    FUNCTIONAL HISTORY  Lives with father, needed assist with ADLs, transfers    CURRENT FUNCTIONAL STATUS  2/25 SLP  Patient consumed trials of puree, honey thick liquids, nectar thick liquids and thin liquids presenting with a moderate to severe oropharyngeal dysphagia.  Oral phase characterized by reduced ability to regulate amount/flow from cup, adequate bolus manipulation, suspect reduced bolus control, suspect premature spillage due to audible swallow with liquid trials, suspect reduced anterior to posterior transport, no oral cavity residue post swallow.  Pharyngeal phase characterized by suspect delayed initiation of pharyngeal swallow, hyolaryngeal elevation and excursion noted upon palpation, coughing following trials of thin liquids and honey thick liquids via cup indicative of aspiration, no signs or symptoms of aspiration across trials of puree, honey thick liquids via teaspoon or nectar thick liquids via teaspoon    2/24 PT  Bed Mobility  Bed Mobility Training Sit-to-Supine: maximum assist (25% patient effort);  1 person assist;  nonverbal cues (demo/gestures);  verbal cues;  bed rails  Bed Mobility Training Supine-to-Sit: maximum assist (25% patient effort);  1 person assist;  nonverbal cues (demo/gestures);  verbal cues;  bed rails  Bed Mobility Training Limitations: decreased ability to use arms for pushing/pulling;  decreased ability to use legs for bridging/pushing;  impaired ability to control trunk for mobility;  decreased strength;  impaired balance;  impaired postural control    2/23 OT    Bed Mobility: Rolling/Turning:     · Level of [place]	dependent (less than 25% patients effort)  · Physical Assist/Nonphysical Assist	2 person assist    Bed Mobility: Supine to Sit:     · Level of [place]	unable to perform; presently seizure monitoring    Bed Mobility Analysis:     · Impairments Contributing to Impaired Bed Mobility	impaired balance; impaired motor control; decreased strength; decreased ROM; abnormal muscle tone    Transfer: Sit to Stand:     · Level of [place]	unable to perform      FAMILY HISTORY   no pertinent history in primary relatives.     RECENT LABS/IMAGING  CBC Full  -  ( 26 Feb 2020 05:45 )  WBC Count : 8.68 K/uL  RBC Count : 3.97 M/uL  Hemoglobin : 11.8 g/dL  Hematocrit : 34.5 %  Platelet Count - Automated : 289 K/uL  Mean Cell Volume : 86.9 fL  Mean Cell Hemoglobin : 29.7 pg  Mean Cell Hemoglobin Concentration : 34.2 %  Auto Neutrophil # : x  Auto Lymphocyte # : x  Auto Monocyte # : x  Auto Eosinophil # : x  Auto Basophil # : x  Auto Neutrophil % : x  Auto Lymphocyte % : x  Auto Monocyte % : x  Auto Eosinophil % : x  Auto Basophil % : x    02-26    139  |  102  |  15  ----------------------------<  123<H>  3.6   |  22  |  1.13    Ca    9.6      26 Feb 2020 05:45  Phos  3.2     02-26  Mg     2.2     02-26    TPro  7.4  /  Alb  3.8  /  TBili  0.3  /  DBili  x   /  AST  10  /  ALT  14  /  AlkPhos  90  02-25      < from: CT Thoracic/lumbar Spine w/ IV Cont (02.25.20 @ 13:07) >    Impression: Degenerative changes as described above.     end of copied text >    < from: CT Head No Cont (02.22.20 @ 17:37) >      IMPRESSION: No change since 2/19/2020. 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 Old right basal ganglia infarct with vacuo dilatation of the right lateral ventricle.    < end of copied text >        ALLERGIES  No Known Allergies      MEDICATIONS   amitriptyline 50 milliGRAM(s) Oral daily  amLODIPine   Tablet 10 milliGRAM(s) Oral daily  atorvastatin 10 milliGRAM(s) Oral at bedtime  chlorhexidine 0.12% Liquid 15 milliLiter(s) Oral Mucosa two times a day  cloNIDine Patch 0.1 mG/24Hr(s) 1 patch Transdermal every 7 days  famotidine   Suspension 20 milliGRAM(s) Oral daily  heparin  Injectable 5000 Unit(s) SubCutaneous every 8 hours  hydrALAZINE 100 milliGRAM(s) Oral three times a day  hydrALAZINE Injectable 10 milliGRAM(s) IV Push once PRN  labetalol 400 milliGRAM(s) Oral every 8 hours  levETIRAcetam  Solution 1000 milliGRAM(s) Oral two times a day  OLANZapine 5 milliGRAM(s) Oral at bedtime  vancomycin  IVPB 750 milliGRAM(s) IV Intermittent <User Schedule>      ----------------------------------------------------------------------------------------  PHYSICAL EXAM  Constitutional - NAD, Comfortable, in bed. Brother and sister at bedside   HEENT - NCAT, EOMI  Neck - Supple, No limited ROM  Chest - Breathing comfortably, No wheezing  Cardiovascular - well perfused    Abdomen - Soft   Extremities - No C/C/E, No calf tenderness   Neurologic Exam - Left gaze preference                    Cognitive - Awake, Alert, AAO to self, place, month, year, situation     Communication - + dysarthria     Cranial Nerves - left facial droop     Motor -    LEFT    UE - 0/5, increased tone                    RIGHT UE - ShAB 5/5, EF 5/5, EE 5/5, WE 5/5,  5/5                    LEFT    LE - 0/5                    RIGHT LE - HF 5/5, KE 2/5, DF 3/5, PF 3/5        Sensory - Intact to LT     Reflexes - +3 b/l     Psychiatric - Mood stable, Affect WNL  ----------------------------------------------------------------------------------------  ASSESSMENT/PLAN  40yMale with h/o CVA (right basal ganglia infarct), left hemiplegia, now with functional deficits after Left thalamic Hemorrhage  Dysphagia, Right leg weakness, baseline left hemiplegia, increased tone.  on keppra for seizure prophylaxis    add melatonin for sleep  on vanco for MRSA bacteremia, followed by ID  Pain - Tylenol  DVT PPX - SCDs, Heparin SQ  Rehab -  Recommend [facility], patient DOES NOT meet acute inpatient rehabilitation criteria, patient at [facility] assist/dependent with transfers.      Given severity of his deficits, patient is unable to reintegrate back to community after this acute hospital stay  Needs Subacute Rehabilitation for cognitive therapy, safety assessment, reconditioning, ADL and mobility restoration in a supervised setting.

## 2020-02-27 LAB
ANION GAP SERPL CALC-SCNC: 14 MMO/L — SIGNIFICANT CHANGE UP (ref 7–14)
B2 GLYCOPROT1 AB SER QL: NEGATIVE — SIGNIFICANT CHANGE UP
BACTERIA BLD CULT: SIGNIFICANT CHANGE UP
BUN SERPL-MCNC: 22 MG/DL — SIGNIFICANT CHANGE UP (ref 7–23)
CALCIUM SERPL-MCNC: 9.5 MG/DL — SIGNIFICANT CHANGE UP (ref 8.4–10.5)
CHLORIDE SERPL-SCNC: 103 MMOL/L — SIGNIFICANT CHANGE UP (ref 98–107)
CO2 SERPL-SCNC: 22 MMOL/L — SIGNIFICANT CHANGE UP (ref 22–31)
CREAT SERPL-MCNC: 1.31 MG/DL — HIGH (ref 0.5–1.3)
GLUCOSE SERPL-MCNC: 132 MG/DL — HIGH (ref 70–99)
HCT VFR BLD CALC: 34.5 % — LOW (ref 39–50)
HGB BLD-MCNC: 11.1 G/DL — LOW (ref 13–17)
MAGNESIUM SERPL-MCNC: 2.3 MG/DL — SIGNIFICANT CHANGE UP (ref 1.6–2.6)
MCHC RBC-ENTMCNC: 29.3 PG — SIGNIFICANT CHANGE UP (ref 27–34)
MCHC RBC-ENTMCNC: 32.2 % — SIGNIFICANT CHANGE UP (ref 32–36)
MCV RBC AUTO: 91 FL — SIGNIFICANT CHANGE UP (ref 80–100)
NRBC # FLD: 0 K/UL — SIGNIFICANT CHANGE UP (ref 0–0)
PHOSPHATE SERPL-MCNC: 3.7 MG/DL — SIGNIFICANT CHANGE UP (ref 2.5–4.5)
PLATELET # BLD AUTO: 326 K/UL — SIGNIFICANT CHANGE UP (ref 150–400)
PMV BLD: 10.5 FL — SIGNIFICANT CHANGE UP (ref 7–13)
POTASSIUM SERPL-MCNC: 3.8 MMOL/L — SIGNIFICANT CHANGE UP (ref 3.5–5.3)
POTASSIUM SERPL-SCNC: 3.8 MMOL/L — SIGNIFICANT CHANGE UP (ref 3.5–5.3)
RBC # BLD: 3.79 M/UL — LOW (ref 4.2–5.8)
RBC # FLD: 13.2 % — SIGNIFICANT CHANGE UP (ref 10.3–14.5)
SODIUM SERPL-SCNC: 139 MMOL/L — SIGNIFICANT CHANGE UP (ref 135–145)
VANCOMYCIN TROUGH SERPL-MCNC: 22.1 UG/ML — HIGH (ref 10–20)
WBC # BLD: 6.75 K/UL — SIGNIFICANT CHANGE UP (ref 3.8–10.5)
WBC # FLD AUTO: 6.75 K/UL — SIGNIFICANT CHANGE UP (ref 3.8–10.5)

## 2020-02-27 PROCEDURE — 99232 SBSQ HOSP IP/OBS MODERATE 35: CPT

## 2020-02-27 PROCEDURE — 99233 SBSQ HOSP IP/OBS HIGH 50: CPT

## 2020-02-27 RX ORDER — MORPHINE SULFATE 50 MG/1
2 CAPSULE, EXTENDED RELEASE ORAL EVERY 6 HOURS
Refills: 0 | Status: DISCONTINUED | OUTPATIENT
Start: 2020-02-27 | End: 2020-03-05

## 2020-02-27 RX ORDER — VANCOMYCIN HCL 1 G
1000 VIAL (EA) INTRAVENOUS ONCE
Refills: 0 | Status: DISCONTINUED | OUTPATIENT
Start: 2020-02-27 | End: 2020-02-27

## 2020-02-27 RX ADMIN — LEVETIRACETAM 1000 MILLIGRAM(S): 250 TABLET, FILM COATED ORAL at 18:17

## 2020-02-27 RX ADMIN — Medication 100 MILLIGRAM(S): at 21:32

## 2020-02-27 RX ADMIN — Medication 3 MILLIGRAM(S): at 21:32

## 2020-02-27 RX ADMIN — HEPARIN SODIUM 5000 UNIT(S): 5000 INJECTION INTRAVENOUS; SUBCUTANEOUS at 15:37

## 2020-02-27 RX ADMIN — LEVETIRACETAM 1000 MILLIGRAM(S): 250 TABLET, FILM COATED ORAL at 05:39

## 2020-02-27 RX ADMIN — Medication 250 MILLIGRAM(S): at 10:47

## 2020-02-27 RX ADMIN — ATORVASTATIN CALCIUM 10 MILLIGRAM(S): 80 TABLET, FILM COATED ORAL at 21:32

## 2020-02-27 RX ADMIN — CHLORHEXIDINE GLUCONATE 15 MILLILITER(S): 213 SOLUTION TOPICAL at 05:38

## 2020-02-27 RX ADMIN — Medication 400 MILLIGRAM(S): at 21:33

## 2020-02-27 RX ADMIN — Medication 100 MILLIGRAM(S): at 15:36

## 2020-02-27 RX ADMIN — FAMOTIDINE 20 MILLIGRAM(S): 10 INJECTION INTRAVENOUS at 18:20

## 2020-02-27 RX ADMIN — Medication 1 PATCH: at 05:36

## 2020-02-27 RX ADMIN — HEPARIN SODIUM 5000 UNIT(S): 5000 INJECTION INTRAVENOUS; SUBCUTANEOUS at 21:32

## 2020-02-27 RX ADMIN — Medication 50 MILLIGRAM(S): at 18:16

## 2020-02-27 RX ADMIN — AMLODIPINE BESYLATE 10 MILLIGRAM(S): 2.5 TABLET ORAL at 05:37

## 2020-02-27 RX ADMIN — Medication 400 MILLIGRAM(S): at 15:36

## 2020-02-27 RX ADMIN — Medication 1 PATCH: at 19:00

## 2020-02-27 RX ADMIN — HEPARIN SODIUM 5000 UNIT(S): 5000 INJECTION INTRAVENOUS; SUBCUTANEOUS at 05:37

## 2020-02-27 RX ADMIN — CHLORHEXIDINE GLUCONATE 15 MILLILITER(S): 213 SOLUTION TOPICAL at 18:17

## 2020-02-27 RX ADMIN — Medication 400 MILLIGRAM(S): at 05:37

## 2020-02-27 RX ADMIN — Medication 100 MILLIGRAM(S): at 05:37

## 2020-02-27 RX ADMIN — OLANZAPINE 5 MILLIGRAM(S): 15 TABLET, FILM COATED ORAL at 21:32

## 2020-02-27 RX ADMIN — Medication 250 MILLIGRAM(S): at 21:33

## 2020-02-27 NOTE — PROGRESS NOTE ADULT - ASSESSMENT
41 y/o M PMHx DMII, reported polysubstance abuse ( alcohol and cocaine), previous ICH in september 2019 with residual left sided weakness was transferred from [facility] after episode of unresponsiveness leading to diagnosis of new hemorrhagic CVA, now with persistent fevers, leukocytosis with bandemia, and MRSA bacteremia.    sepsis, fever, leucocytosis, tachycardia, due to MRSA Bacteremia, possible source phlebitis.   resolved sepsis, no fever or leucocytosis,      Plan:   -Bcx 1 of 4 (aerobic) positive for MRSA.   -Unclear source, possible phlebitis rt arm.   -CT with no obvious spinal involvement.   - repeat TTE with no obvious vegetation  -c/w Vanco for MRSA pneumonia, trough elevated today at 22, but collected 8 hrs after the dose instead for 11 hrs, c/w 1 gm iv q12h   -monitor vancomycin trough and creatinine to avoid nephrotoxicity and ensure efficacy   -repeat blood cx NTD.   -will eventually need PICC, will need 4 weeks of therapy from the negative cx.   -neuro following

## 2020-02-27 NOTE — PROGRESS NOTE ADULT - SUBJECTIVE AND OBJECTIVE BOX
PROGRESS NOTE:     Patient is a 40y old  Male who presents with a chief complaint of Hemorrhagic cva (26 Feb 2020 15:53)      SUBJECTIVE / OVERNIGHT EVENTS:  Patient seen and examined, back pain improved and patient slept better last night.     ADDITIONAL REVIEW OF SYSTEMS:  No fevers or chills.    MEDICATIONS  (STANDING):  amitriptyline 50 milliGRAM(s) Oral daily  amLODIPine   Tablet 10 milliGRAM(s) Oral daily  atorvastatin 10 milliGRAM(s) Oral at bedtime  chlorhexidine 0.12% Liquid 15 milliLiter(s) Oral Mucosa two times a day  cloNIDine Patch 0.1 mG/24Hr(s) 1 patch Transdermal every 7 days  famotidine   Suspension 20 milliGRAM(s) Oral daily  heparin  Injectable 5000 Unit(s) SubCutaneous every 8 hours  hydrALAZINE 100 milliGRAM(s) Oral three times a day  labetalol 400 milliGRAM(s) Oral every 8 hours  levETIRAcetam  Solution 1000 milliGRAM(s) Oral two times a day  melatonin 3 milliGRAM(s) Oral at bedtime  OLANZapine 5 milliGRAM(s) Oral at bedtime  vancomycin  IVPB 1000 milliGRAM(s) IV Intermittent <User Schedule>    MEDICATIONS  (PRN):  hydrALAZINE Injectable 10 milliGRAM(s) IV Push once PRN hypertension SBP>180  morphine  - Injectable 2 milliGRAM(s) IV Push every 6 hours PRN Severe Pain (7 - 10)      CAPILLARY BLOOD GLUCOSE        I&O's Summary    26 Feb 2020 07:01  -  27 Feb 2020 07:00  --------------------------------------------------------  IN: 0 mL / OUT: 650 mL / NET: -650 mL        PHYSICAL EXAM:  Vital Signs Last 24 Hrs  T(C): 37 (27 Feb 2020 15:33), Max: 37 (27 Feb 2020 15:33)  T(F): 98.6 (27 Feb 2020 15:33), Max: 98.6 (27 Feb 2020 15:33)  HR: 71 (27 Feb 2020 15:33) (71 - 82)  BP: 153/89 (27 Feb 2020 15:33) (117/75 - 153/89)  BP(mean): --  RR: 17 (27 Feb 2020 15:33) (16 - 17)  SpO2: 99% (27 Feb 2020 15:33) (97% - 99%)    CONSTITUTIONAL: NAD, well-developed, pleasant  RESPIRATORY: Normal respiratory effort; lungs are clear to auscultation bilaterally  CARDIOVASCULAR: Regular rate and rhythm, normal S1 and S2, no murmur/rub/gallop; No lower extremity edema; Peripheral pulses are 2+ bilaterally  ABDOMEN: Nontender to palpation, normoactive bowel sounds, no rebound/guarding; No hepatosplenomegaly  MUSCLOSKELETAL: no clubbing or cyanosis of digits; no joint swelling or tenderness to palpation  PSYCH: pleasant, awake and talking   LABS:                        11.1   6.75  )-----------( 326      ( 27 Feb 2020 06:45 )             34.5     02-27    139  |  103  |  22  ----------------------------<  132<H>  3.8   |  22  |  1.31<H>    Ca    9.5      27 Feb 2020 06:45  Phos  3.7     02-27  Mg     2.3     02-27                  RADIOLOGY & ADDITIONAL TESTS:  Results Reviewed:   Imaging Personally Reviewed: CT spine shows degenerative changes, awaiting brain mri  Electrocardiogram Personally Reviewed:    COORDINATION OF CARE:  Care Discussed with Consultants/Other Providers [Y/N]:  Prior or Outpatient Records Reviewed [Y/N]:

## 2020-02-27 NOTE — PROGRESS NOTE ADULT - ATTENDING COMMENTS
Akilah Lamas  Pager: [phone]. If no response or past 5 pm call [phone].     My colleague will cover 2/28-3/1

## 2020-02-27 NOTE — PROGRESS NOTE ADULT - SUBJECTIVE AND OBJECTIVE BOX
40y old  Male who presents with a chief complaint of hemorrhagic cva (27 Feb 2020 17:22)      Interval history:  Afebrile, offers no complains.     Allergies:   No Known Allergies    Antimicrobials:  vancomycin  IVPB 1000 milliGRAM(s) IV Intermittent <User Schedule>      REVIEW OF SYSTEMS:  No chest pain  No cough, no SOB  No N/V/D, no abdominal pain  No rash.       Vital Signs Last 24 Hrs  T(C): 37 (02-27-20 @ 15:33), Max: 37 (02-27-20 @ 15:33)  T(F): 98.6 (02-27-20 @ 15:33), Max: 98.6 (02-27-20 @ 15:33)  HR: 71 (02-27-20 @ 15:33) (71 - 82)  BP: 153/89 (02-27-20 @ 15:33) (117/75 - 153/89)  BP(mean): --  RR: 17 (02-27-20 @ 15:33) (16 - 17)  SpO2: 99% (02-27-20 @ 15:33) (97% - 99%)      PHYSICAL EXAM:  Patient in no acute distress. Alert, awake.   Cardiovascular: S1S2 normal.  Lungs: + air entry B/L lung fields, coarse rales scattered.   Gastrointestinal: soft, nontender, nondistended.  Extremities: no edema.  IV sites not inflamed.                           11.1   6.75  )-----------( 326      ( 27 Feb 2020 06:45 )             34.5   02-27    139  |  103  |  22  ----------------------------<  132<H>  3.8   |  22  |  1.31<H>    Ca    9.5      27 Feb 2020 06:45  Phos  3.7     02-27  Mg     2.3     02-27      Culture - Blood in AM (02.23.20 @ 03:18)    Culture - Blood:   NO ORGANISMS ISOLATED  NO ORGANISMS ISOLATED AT 96 HOURS    Specimen Source: BLOOD PERIPHERAL

## 2020-02-28 LAB
ANION GAP SERPL CALC-SCNC: 14 MMO/L — SIGNIFICANT CHANGE UP (ref 7–14)
BACTERIA BLD CULT: SIGNIFICANT CHANGE UP
BUN SERPL-MCNC: 21 MG/DL — SIGNIFICANT CHANGE UP (ref 7–23)
CALCIUM SERPL-MCNC: 9.7 MG/DL — SIGNIFICANT CHANGE UP (ref 8.4–10.5)
CHLORIDE SERPL-SCNC: 99 MMOL/L — SIGNIFICANT CHANGE UP (ref 98–107)
CO2 SERPL-SCNC: 21 MMOL/L — LOW (ref 22–31)
COLLECT DURATION TIME UR: 24 H — SIGNIFICANT CHANGE UP
CREAT SERPL-MCNC: 1.26 MG/DL — SIGNIFICANT CHANGE UP (ref 0.5–1.3)
GLUCOSE SERPL-MCNC: 117 MG/DL — HIGH (ref 70–99)
HCT VFR BLD CALC: 33.8 % — LOW (ref 39–50)
HGB BLD-MCNC: 11.3 G/DL — LOW (ref 13–17)
MAGNESIUM SERPL-MCNC: 2.2 MG/DL — SIGNIFICANT CHANGE UP (ref 1.6–2.6)
MCHC RBC-ENTMCNC: 29.8 PG — SIGNIFICANT CHANGE UP (ref 27–34)
MCHC RBC-ENTMCNC: 33.4 % — SIGNIFICANT CHANGE UP (ref 32–36)
MCV RBC AUTO: 89.2 FL — SIGNIFICANT CHANGE UP (ref 80–100)
METANEPH 24H UR-MRATE: 692 — HIGH
METANEPH UR-MCNC: 1300 ML — SIGNIFICANT CHANGE UP
METANEPH UR-MCNC: 24 H — SIGNIFICANT CHANGE UP
METANEPH UR-MCNC: 692 — HIGH
METANEPHS 24H UR-MRATE: 1300 ML — SIGNIFICANT CHANGE UP
METANEPHS 24H UR-MRATE: 2499 — HIGH
METANEPHS UR-MCNC: 2499 — HIGH
NORMETANEPHRINE 24H UR-MRATE: 1807 — HIGH
NORMETANEPHRINE UR-SCNC: 1807 — HIGH
NRBC # FLD: 0 K/UL — SIGNIFICANT CHANGE UP (ref 0–0)
PHOSPHATE SERPL-MCNC: 3.6 MG/DL — SIGNIFICANT CHANGE UP (ref 2.5–4.5)
PLATELET # BLD AUTO: 306 K/UL — SIGNIFICANT CHANGE UP (ref 150–400)
PMV BLD: 9.9 FL — SIGNIFICANT CHANGE UP (ref 7–13)
POTASSIUM SERPL-MCNC: 3.8 MMOL/L — SIGNIFICANT CHANGE UP (ref 3.5–5.3)
POTASSIUM SERPL-SCNC: 3.8 MMOL/L — SIGNIFICANT CHANGE UP (ref 3.5–5.3)
RBC # BLD: 3.79 M/UL — LOW (ref 4.2–5.8)
RBC # FLD: 13.2 % — SIGNIFICANT CHANGE UP (ref 10.3–14.5)
SODIUM SERPL-SCNC: 134 MMOL/L — LOW (ref 135–145)
SPECIMEN VOL 24H UR: 1300 ML — SIGNIFICANT CHANGE UP
SPECIMEN VOL 24H UR: 2499 — HIGH
VANCOMYCIN TROUGH SERPL-MCNC: 19.2 UG/ML — SIGNIFICANT CHANGE UP (ref 10–20)
WBC # BLD: 7.27 K/UL — SIGNIFICANT CHANGE UP (ref 3.8–10.5)
WBC # FLD AUTO: 7.27 K/UL — SIGNIFICANT CHANGE UP (ref 3.8–10.5)

## 2020-02-28 PROCEDURE — 99233 SBSQ HOSP IP/OBS HIGH 50: CPT

## 2020-02-28 PROCEDURE — 99232 SBSQ HOSP IP/OBS MODERATE 35: CPT

## 2020-02-28 RX ORDER — LEVETIRACETAM 250 MG/1
1000 TABLET, FILM COATED ORAL
Refills: 0 | Status: DISCONTINUED | OUTPATIENT
Start: 2020-02-28 | End: 2020-03-10

## 2020-02-28 RX ADMIN — FAMOTIDINE 20 MILLIGRAM(S): 10 INJECTION INTRAVENOUS at 14:13

## 2020-02-28 RX ADMIN — LEVETIRACETAM 1000 MILLIGRAM(S): 250 TABLET, FILM COATED ORAL at 18:48

## 2020-02-28 RX ADMIN — Medication 250 MILLIGRAM(S): at 14:11

## 2020-02-28 RX ADMIN — Medication 100 MILLIGRAM(S): at 14:13

## 2020-02-28 RX ADMIN — Medication 100 MILLIGRAM(S): at 22:07

## 2020-02-28 RX ADMIN — CHLORHEXIDINE GLUCONATE 15 MILLILITER(S): 213 SOLUTION TOPICAL at 18:47

## 2020-02-28 RX ADMIN — Medication 100 MILLIGRAM(S): at 05:12

## 2020-02-28 RX ADMIN — Medication 3 MILLIGRAM(S): at 22:05

## 2020-02-28 RX ADMIN — Medication 400 MILLIGRAM(S): at 14:13

## 2020-02-28 RX ADMIN — OLANZAPINE 5 MILLIGRAM(S): 15 TABLET, FILM COATED ORAL at 22:05

## 2020-02-28 RX ADMIN — HEPARIN SODIUM 5000 UNIT(S): 5000 INJECTION INTRAVENOUS; SUBCUTANEOUS at 05:13

## 2020-02-28 RX ADMIN — CHLORHEXIDINE GLUCONATE 15 MILLILITER(S): 213 SOLUTION TOPICAL at 05:13

## 2020-02-28 RX ADMIN — HEPARIN SODIUM 5000 UNIT(S): 5000 INJECTION INTRAVENOUS; SUBCUTANEOUS at 22:04

## 2020-02-28 RX ADMIN — Medication 1 PATCH: at 19:00

## 2020-02-28 RX ADMIN — HEPARIN SODIUM 5000 UNIT(S): 5000 INJECTION INTRAVENOUS; SUBCUTANEOUS at 14:13

## 2020-02-28 RX ADMIN — ATORVASTATIN CALCIUM 10 MILLIGRAM(S): 80 TABLET, FILM COATED ORAL at 22:05

## 2020-02-28 RX ADMIN — Medication 50 MILLIGRAM(S): at 14:13

## 2020-02-28 RX ADMIN — AMLODIPINE BESYLATE 10 MILLIGRAM(S): 2.5 TABLET ORAL at 05:12

## 2020-02-28 RX ADMIN — Medication 400 MILLIGRAM(S): at 05:13

## 2020-02-28 RX ADMIN — LEVETIRACETAM 1000 MILLIGRAM(S): 250 TABLET, FILM COATED ORAL at 05:13

## 2020-02-28 RX ADMIN — Medication 400 MILLIGRAM(S): at 22:05

## 2020-02-28 NOTE — PROGRESS NOTE ADULT - ASSESSMENT
39 y/o M PMHx DMII, reported polysubstance abuse ( alcohol and cocaine), previous ICH in september 2019 with residual left sided weakness was transferred from [facility] after episode of unresponsiveness leading to diagnosis of new hemorrhagic CVA, now with persistent fevers, leukocytosis with bandemia, and MRSA bacteremia.    sepsis, fever, leucocytosis, tachycardia, due to MRSA Bacteremia, possible source phlebitis.   resolved sepsis, no fever or leucocytosis,      Plan:   -Bcx 1 of 4 (aerobic) positive for MRSA.   -Unclear source, possible phlebitis rt arm.   -CT with no obvious spinal involvement.   - repeat TTE with no obvious vegetation  -c/w Vanco for MRSA pneumonia,   -monitor vancomycin trough and creatinine to avoid nephrotoxicity and ensure efficacy   -repeat blood cx NTD.   -will eventually need PICC, will need 4 weeks of therapy from the negative cx.   -neuro following

## 2020-02-28 NOTE — PROGRESS NOTE ADULT - SUBJECTIVE AND OBJECTIVE BOX
LUIS EDUARDO FRANKEL 40y [id number]    Patient is a 40y old  Male who presents with a chief complaint of hemorrhagic cva (28 Feb 2020 15:04)      Follow Up/CC:  ID following for bacteremia    Interval History/ROS: feels ok, no fever    Allergies    No Known Allergies    Intolerances        ANTIMICROBIALS:  vancomycin  IVPB 1000 <User Schedule>      MEDICATIONS  (STANDING):  amitriptyline 50 milliGRAM(s) Oral daily  amLODIPine   Tablet 10 milliGRAM(s) Oral daily  atorvastatin 10 milliGRAM(s) Oral at bedtime  chlorhexidine 0.12% Liquid 15 milliLiter(s) Oral Mucosa two times a day  cloNIDine Patch 0.1 mG/24Hr(s) 1 patch Transdermal every 7 days  famotidine   Suspension 20 milliGRAM(s) Oral daily  heparin  Injectable 5000 Unit(s) SubCutaneous every 8 hours  hydrALAZINE 100 milliGRAM(s) Oral three times a day  labetalol 400 milliGRAM(s) Oral every 8 hours  levETIRAcetam 1000 milliGRAM(s) Oral two times a day  melatonin 3 milliGRAM(s) Oral at bedtime  OLANZapine 5 milliGRAM(s) Oral at bedtime  vancomycin  IVPB 1000 milliGRAM(s) IV Intermittent <User Schedule>    MEDICATIONS  (PRN):  hydrALAZINE Injectable 10 milliGRAM(s) IV Push once PRN hypertension SBP>180  morphine  - Injectable 2 milliGRAM(s) IV Push every 6 hours PRN Severe Pain (7 - 10)        Vital Signs Last 24 Hrs  T(C): 37.4 (28 Feb 2020 14:15), Max: 37.4 (28 Feb 2020 14:15)  T(F): 99.3 (28 Feb 2020 14:15), Max: 99.3 (28 Feb 2020 14:15)  HR: 81 (28 Feb 2020 14:15) (72 - 81)  BP: 140/95 (28 Feb 2020 14:15) (117/88 - 159/109)  BP(mean): --  RR: 100 (28 Feb 2020 14:15) (17 - 100)  SpO2: 99% (28 Feb 2020 05:11) (98% - 99%)    CBC Full  -  ( 28 Feb 2020 06:40 )  WBC Count : 7.27 K/uL  RBC Count : 3.79 M/uL  Hemoglobin : 11.3 g/dL  Hematocrit : 33.8 %  Platelet Count - Automated : 306 K/uL  Mean Cell Volume : 89.2 fL  Mean Cell Hemoglobin : 29.8 pg  Mean Cell Hemoglobin Concentration : 33.4 %  Auto Neutrophil # : x  Auto Lymphocyte # : x  Auto Monocyte # : x  Auto Eosinophil # : x  Auto Basophil # : x  Auto Neutrophil % : x  Auto Lymphocyte % : x  Auto Monocyte % : x  Auto Eosinophil % : x  Auto Basophil % : x    02-28    134<L>  |  99  |  21  ----------------------------<  117<H>  3.8   |  21<L>  |  1.26    Ca    9.7      28 Feb 2020 06:40  Phos  3.6     02-28  Mg     2.2     02-28            MICROBIOLOGY:  BLOOD PERIPHERAL  02-23-20 --  --  --      BLOOD PERIPHERAL  02-22-20 --  --  --      BLOOD PERIPHERAL  02-21-20 --  --  BLOOD CULTURE PCR  Staph. aureus *MRSA*              Vancomycin Level, Trough: 19.2 ug/mL (02-28-20 @ 12:18)  v            RADIOLOGY    < from: CT Thoracic Spine w/ IV Cont (02.25.20 @ 13:07) >  Degenerative changes as described above.    < end of copied text >

## 2020-02-28 NOTE — PROGRESS NOTE ADULT - ATTENDING COMMENTS
Jhon Smith  Attending Physician   Division of Infectious Disease  Pager #[phone]  After 5pm/weekend or no response, call #[phone]    Please call the ID service [phone] with questions or concerns over the weekend.

## 2020-02-28 NOTE — PROGRESS NOTE ADULT - SUBJECTIVE AND OBJECTIVE BOX
PROGRESS NOTE:     Patient is a 40y old  Male who presents with a chief complaint of hemorrhagic cva (27 Feb 2020 17:22)      SUBJECTIVE / OVERNIGHT EVENTS:  Patient seen and examined, back pain improved, didn't sleep well last night, this morning sleeping.    ADDITIONAL REVIEW OF SYSTEMS:  No fevers or chills.      MEDICATIONS  (STANDING):  amitriptyline 50 milliGRAM(s) Oral daily  amLODIPine   Tablet 10 milliGRAM(s) Oral daily  atorvastatin 10 milliGRAM(s) Oral at bedtime  chlorhexidine 0.12% Liquid 15 milliLiter(s) Oral Mucosa two times a day  cloNIDine Patch 0.1 mG/24Hr(s) 1 patch Transdermal every 7 days  famotidine   Suspension 20 milliGRAM(s) Oral daily  heparin  Injectable 5000 Unit(s) SubCutaneous every 8 hours  hydrALAZINE 100 milliGRAM(s) Oral three times a day  labetalol 400 milliGRAM(s) Oral every 8 hours  levETIRAcetam 1000 milliGRAM(s) Oral two times a day  melatonin 3 milliGRAM(s) Oral at bedtime  OLANZapine 5 milliGRAM(s) Oral at bedtime  vancomycin  IVPB 1000 milliGRAM(s) IV Intermittent <User Schedule>    MEDICATIONS  (PRN):  hydrALAZINE Injectable 10 milliGRAM(s) IV Push once PRN hypertension SBP>180  morphine  - Injectable 2 milliGRAM(s) IV Push every 6 hours PRN Severe Pain (7 - 10)      CAPILLARY BLOOD GLUCOSE        I&O's Summary    27 Feb 2020 07:01  -  28 Feb 2020 07:00  --------------------------------------------------------  IN: 0 mL / OUT: 600 mL / NET: -600 mL        PHYSICAL EXAM:  Vital Signs Last 24 Hrs  T(C): 37.4 (28 Feb 2020 14:15), Max: 37.4 (28 Feb 2020 14:15)  T(F): 99.3 (28 Feb 2020 14:15), Max: 99.3 (28 Feb 2020 14:15)  HR: 81 (28 Feb 2020 14:15) (71 - 81)  BP: 140/95 (28 Feb 2020 14:15) (117/88 - 159/109)  BP(mean): --  RR: 100 (28 Feb 2020 14:15) (17 - 100)  SpO2: 99% (28 Feb 2020 05:11) (98% - 99%)    CONSTITUTIONAL: NAD, well-developed, sleeping  RESPIRATORY: Normal respiratory effort; lungs are clear to auscultation bilaterally  CARDIOVASCULAR: Regular rate and rhythm, normal S1 and S2, no murmur/rub/gallop; No lower extremity edema; Peripheral pulses are 2+ bilaterally  ABDOMEN: Nontender to palpation, normoactive bowel sounds, no rebound/guarding; No hepatosplenomegaly  MUSCLOSKELETAL: no clubbing or cyanosis of digits; no joint swelling or tenderness to palpation      LABS:                        11.3   7.27  )-----------( 306      ( 28 Feb 2020 06:40 )             33.8     02-28    134<L>  |  99  |  21  ----------------------------<  117<H>  3.8   |  21<L>  |  1.26    Ca    9.7      28 Feb 2020 06:40  Phos  3.6     02-28  Mg     2.2     02-28                  RADIOLOGY & ADDITIONAL TESTS:  Results Reviewed:   Imaging Personally Reviewed:  Electrocardiogram Personally Reviewed:    COORDINATION OF CARE:  Care Discussed with Consultants/Other Providers [Y/N]:  Prior or Outpatient Records Reviewed [Y/N]:

## 2020-02-28 NOTE — PROGRESS NOTE ADULT - ASSESSMENT
40M w/HTN and ICH in September 2019 (with residual LUE and LLE weakness) transferred from  where he presented with AMS and unresponsiveness, intubated for airway protection, fd to have acute ICH. Repeat CTH stable. Required nicardipine gtt in ICU. MRSA bacteremia on Vanc. CT spine pending and MR brain pending.  ID on board. TTE negative.

## 2020-02-28 NOTE — CHART NOTE - NSCHARTNOTEFT_GEN_A_CORE
Source: Patient [ ]    Family [X] Father @ bed side     other [X] Nurse, Medical chart   Diet rx: Dysphagia 1 Pureed-Honey Consistency Fluid     Pt 39 yo male with Non-traumatic intracranial hemorrhage - per chart review. At time of visit Pt asleep; Pt's father at bed side. Per father Pt with good appetite/PO intake. Case discussed with nurse. Per nurse Pt needs to be fed. No report of nausea, vomiting or diarrhea @ this time. Of note Pt passed repeat Swallow Beside Assessment Adult, SLP rec: Puree Consistency and Honey Thick Liquids (2/25). No report of pressure injury @ present. RDN remains available, nurse made aware.     Enteral/Parenteral Nutrition: N/A  Pt's weight: 67.4 Kg - 2/18/20    Pertinent Medications: Heparin, Lipitor, Pepcid,   Pertinent Labs:  02-28 Na134 mmol/L<L> Glu 117 mg/dL<H> K+ 3.8 mmol/L Cr  1.26 mg/dL BUN 21 mg/dL 02-28 Phos 3.6 mg/dL 02-25 Alb 3.8 g/dL 02-18 [id number] 4.9 %    Skin: no pressure injury reported  +IAD/MAD documented   Estimated Needs:   [X] no change since previous assessment  [ ] recalculated:     Recommendations:   1. Continue PO diet as ordered; Encourage & assist Pt with meals; Monitor PO diet tolerance; Honor food preferences;   2. Monitor labs, weely weights, hydration status;

## 2020-02-29 LAB
ANION GAP SERPL CALC-SCNC: 14 MMO/L — SIGNIFICANT CHANGE UP (ref 7–14)
BUN SERPL-MCNC: 23 MG/DL — SIGNIFICANT CHANGE UP (ref 7–23)
CALCIUM SERPL-MCNC: 10.1 MG/DL — SIGNIFICANT CHANGE UP (ref 8.4–10.5)
CHLORIDE SERPL-SCNC: 100 MMOL/L — SIGNIFICANT CHANGE UP (ref 98–107)
CO2 SERPL-SCNC: 23 MMOL/L — SIGNIFICANT CHANGE UP (ref 22–31)
CREAT SERPL-MCNC: 1.39 MG/DL — HIGH (ref 0.5–1.3)
GLUCOSE SERPL-MCNC: 102 MG/DL — HIGH (ref 70–99)
HCT VFR BLD CALC: 32.3 % — LOW (ref 39–50)
HGB BLD-MCNC: 10.9 G/DL — LOW (ref 13–17)
MAGNESIUM SERPL-MCNC: 2.1 MG/DL — SIGNIFICANT CHANGE UP (ref 1.6–2.6)
MCHC RBC-ENTMCNC: 30.4 PG — SIGNIFICANT CHANGE UP (ref 27–34)
MCHC RBC-ENTMCNC: 33.7 % — SIGNIFICANT CHANGE UP (ref 32–36)
MCV RBC AUTO: 90.2 FL — SIGNIFICANT CHANGE UP (ref 80–100)
NRBC # FLD: 0 K/UL — SIGNIFICANT CHANGE UP (ref 0–0)
PHOSPHATE SERPL-MCNC: 3.9 MG/DL — SIGNIFICANT CHANGE UP (ref 2.5–4.5)
PLATELET # BLD AUTO: 311 K/UL — SIGNIFICANT CHANGE UP (ref 150–400)
PMV BLD: 9.9 FL — SIGNIFICANT CHANGE UP (ref 7–13)
POTASSIUM SERPL-MCNC: 3.8 MMOL/L — SIGNIFICANT CHANGE UP (ref 3.5–5.3)
POTASSIUM SERPL-SCNC: 3.8 MMOL/L — SIGNIFICANT CHANGE UP (ref 3.5–5.3)
RBC # BLD: 3.58 M/UL — LOW (ref 4.2–5.8)
RBC # FLD: 13.2 % — SIGNIFICANT CHANGE UP (ref 10.3–14.5)
SODIUM SERPL-SCNC: 137 MMOL/L — SIGNIFICANT CHANGE UP (ref 135–145)
VANCOMYCIN TROUGH SERPL-MCNC: 31.2 UG/ML — CRITICAL HIGH (ref 10–20)
WBC # BLD: 6.7 K/UL — SIGNIFICANT CHANGE UP (ref 3.8–10.5)
WBC # FLD AUTO: 6.7 K/UL — SIGNIFICANT CHANGE UP (ref 3.8–10.5)

## 2020-02-29 PROCEDURE — 99233 SBSQ HOSP IP/OBS HIGH 50: CPT

## 2020-02-29 RX ADMIN — OLANZAPINE 5 MILLIGRAM(S): 15 TABLET, FILM COATED ORAL at 21:39

## 2020-02-29 RX ADMIN — Medication 1 PATCH: at 08:00

## 2020-02-29 RX ADMIN — Medication 250 MILLIGRAM(S): at 14:20

## 2020-02-29 RX ADMIN — FAMOTIDINE 20 MILLIGRAM(S): 10 INJECTION INTRAVENOUS at 14:20

## 2020-02-29 RX ADMIN — Medication 1 PATCH: at 19:50

## 2020-02-29 RX ADMIN — LEVETIRACETAM 1000 MILLIGRAM(S): 250 TABLET, FILM COATED ORAL at 06:37

## 2020-02-29 RX ADMIN — LEVETIRACETAM 1000 MILLIGRAM(S): 250 TABLET, FILM COATED ORAL at 17:45

## 2020-02-29 RX ADMIN — HEPARIN SODIUM 5000 UNIT(S): 5000 INJECTION INTRAVENOUS; SUBCUTANEOUS at 14:20

## 2020-02-29 RX ADMIN — Medication 50 MILLIGRAM(S): at 14:19

## 2020-02-29 RX ADMIN — Medication 100 MILLIGRAM(S): at 14:19

## 2020-02-29 RX ADMIN — Medication 400 MILLIGRAM(S): at 14:19

## 2020-02-29 RX ADMIN — CHLORHEXIDINE GLUCONATE 15 MILLILITER(S): 213 SOLUTION TOPICAL at 17:45

## 2020-02-29 RX ADMIN — Medication 250 MILLIGRAM(S): at 01:35

## 2020-02-29 RX ADMIN — CHLORHEXIDINE GLUCONATE 15 MILLILITER(S): 213 SOLUTION TOPICAL at 06:04

## 2020-02-29 RX ADMIN — Medication 400 MILLIGRAM(S): at 06:05

## 2020-02-29 RX ADMIN — Medication 3 MILLIGRAM(S): at 21:39

## 2020-02-29 RX ADMIN — HEPARIN SODIUM 5000 UNIT(S): 5000 INJECTION INTRAVENOUS; SUBCUTANEOUS at 21:39

## 2020-02-29 RX ADMIN — AMLODIPINE BESYLATE 10 MILLIGRAM(S): 2.5 TABLET ORAL at 06:05

## 2020-02-29 RX ADMIN — Medication 100 MILLIGRAM(S): at 06:04

## 2020-02-29 RX ADMIN — HEPARIN SODIUM 5000 UNIT(S): 5000 INJECTION INTRAVENOUS; SUBCUTANEOUS at 06:05

## 2020-02-29 RX ADMIN — Medication 400 MILLIGRAM(S): at 21:39

## 2020-02-29 RX ADMIN — Medication 100 MILLIGRAM(S): at 21:38

## 2020-02-29 RX ADMIN — MORPHINE SULFATE 2 MILLIGRAM(S): 50 CAPSULE, EXTENDED RELEASE ORAL at 21:13

## 2020-02-29 RX ADMIN — ATORVASTATIN CALCIUM 10 MILLIGRAM(S): 80 TABLET, FILM COATED ORAL at 21:39

## 2020-02-29 RX ADMIN — MORPHINE SULFATE 2 MILLIGRAM(S): 50 CAPSULE, EXTENDED RELEASE ORAL at 20:13

## 2020-02-29 NOTE — PROGRESS NOTE ADULT - PROBLEM SELECTOR PLAN 3
On vancomycin  F/U ID  CT lumbar spine/Thoracic done results above. MRI w/wo cont also ordered  Will need PICC line for a total of 4 weeks of antibiotics

## 2020-02-29 NOTE — PROVIDER CONTACT NOTE (CHANGE IN STATUS NOTIFICATION) - SITUATION
Neuro checks Q 4 hours completed at 0000 shows that patient is very lethargic, sleepy and sleeps between care.  Vigorous rubbing to awaken but does not stay awake.  Pt did however receive Melatonin and Zyprexa at hour of sleep.

## 2020-02-29 NOTE — PROGRESS NOTE ADULT - SUBJECTIVE AND OBJECTIVE BOX
PROGRESS NOTE:     Patient is a 40y old  Male who presents with a chief complaint of hemorrhagic cva (28 Feb 2020 15:04)      SUBJECTIVE / OVERNIGHT EVENTS:  Patient seen and examined this morning. Overnight the patient appeared lethargic, but also reportedly having sleep issues lately.     ADDITIONAL REVIEW OF SYSTEMS:  No fevers or chills    MEDICATIONS  (STANDING):  amitriptyline 50 milliGRAM(s) Oral daily  amLODIPine   Tablet 10 milliGRAM(s) Oral daily  atorvastatin 10 milliGRAM(s) Oral at bedtime  chlorhexidine 0.12% Liquid 15 milliLiter(s) Oral Mucosa two times a day  cloNIDine Patch 0.1 mG/24Hr(s) 1 patch Transdermal every 7 days  famotidine   Suspension 20 milliGRAM(s) Oral daily  heparin  Injectable 5000 Unit(s) SubCutaneous every 8 hours  hydrALAZINE 100 milliGRAM(s) Oral three times a day  labetalol 400 milliGRAM(s) Oral every 8 hours  levETIRAcetam 1000 milliGRAM(s) Oral two times a day  melatonin 3 milliGRAM(s) Oral at bedtime  OLANZapine 5 milliGRAM(s) Oral at bedtime  vancomycin  IVPB 1000 milliGRAM(s) IV Intermittent <User Schedule>    MEDICATIONS  (PRN):  hydrALAZINE Injectable 10 milliGRAM(s) IV Push once PRN hypertension SBP>180  morphine  - Injectable 2 milliGRAM(s) IV Push every 6 hours PRN Severe Pain (7 - 10)      CAPILLARY BLOOD GLUCOSE        I&O's Summary    28 Feb 2020 07:01  -  29 Feb 2020 07:00  --------------------------------------------------------  IN: 0 mL / OUT: 900 mL / NET: -900 mL        PHYSICAL EXAM:  Vital Signs Last 24 Hrs  T(C): 36.7 (29 Feb 2020 05:00), Max: 37.4 (28 Feb 2020 14:15)  T(F): 98 (29 Feb 2020 05:00), Max: 99.3 (28 Feb 2020 14:15)  HR: 65 (29 Feb 2020 05:00) (65 - 81)  BP: 135/90 (29 Feb 2020 05:00) (118/76 - 140/95)  BP(mean): --  RR: 16 (29 Feb 2020 05:00) (16 - 100)  SpO2: 99% (29 Feb 2020 05:00) (98% - 99%)    CONSTITUTIONAL: NAD, well-developed, pleasant  RESPIRATORY: Normal respiratory effort; lungs are clear to auscultation bilaterally  CARDIOVASCULAR: Regular rate and rhythm, normal S1 and S2, no murmur/rub/gallop; No lower extremity edema; Peripheral pulses are 2+ bilaterally  ABDOMEN: Nontender to palpation, normoactive bowel sounds, no rebound/guarding; No hepatosplenomegaly  MUSCLOSKELETAL: no clubbing or cyanosis of digits; no joint swelling or tenderness to palpation    LABS:                        10.9   6.70  )-----------( 311      ( 29 Feb 2020 05:33 )             32.3     02-29    137  |  100  |  23  ----------------------------<  102<H>  3.8   |  23  |  1.39<H>    Ca    10.1      29 Feb 2020 05:33  Phos  3.9     02-29  Mg     2.1     02-29                  RADIOLOGY & ADDITIONAL TESTS:  Results Reviewed:   Imaging Personally Reviewed:  Electrocardiogram Personally Reviewed:    COORDINATION OF CARE:  Care Discussed with Consultants/Other Providers [Y/N]:  Prior or Outpatient Records Reviewed [Y/N]: PROGRESS NOTE:     Patient is a 40y old  Male who presents with a chief complaint of hemorrhagic cva (28 Feb 2020 15:04)      SUBJECTIVE / OVERNIGHT EVENTS:  Patient seen and examined this morning. Overnight the patient appeared lethargic, but also reportedly having sleep issues lately.     ADDITIONAL REVIEW OF SYSTEMS:  No fevers or chills    MEDICATIONS  (STANDING):  amitriptyline 50 milliGRAM(s) Oral daily  amLODIPine   Tablet 10 milliGRAM(s) Oral daily  atorvastatin 10 milliGRAM(s) Oral at bedtime  chlorhexidine 0.12% Liquid 15 milliLiter(s) Oral Mucosa two times a day  cloNIDine Patch 0.1 mG/24Hr(s) 1 patch Transdermal every 7 days  famotidine   Suspension 20 milliGRAM(s) Oral daily  heparin  Injectable 5000 Unit(s) SubCutaneous every 8 hours  hydrALAZINE 100 milliGRAM(s) Oral three times a day  labetalol 400 milliGRAM(s) Oral every 8 hours  levETIRAcetam 1000 milliGRAM(s) Oral two times a day  melatonin 3 milliGRAM(s) Oral at bedtime  OLANZapine 5 milliGRAM(s) Oral at bedtime  vancomycin  IVPB 1000 milliGRAM(s) IV Intermittent <User Schedule>    MEDICATIONS  (PRN):  hydrALAZINE Injectable 10 milliGRAM(s) IV Push once PRN hypertension SBP>180  morphine  - Injectable 2 milliGRAM(s) IV Push every 6 hours PRN Severe Pain (7 - 10)      CAPILLARY BLOOD GLUCOSE        I&O's Summary    28 Feb 2020 07:01  -  29 Feb 2020 07:00  --------------------------------------------------------  IN: 0 mL / OUT: 900 mL / NET: -900 mL        PHYSICAL EXAM:  Vital Signs Last 24 Hrs  T(C): 36.7 (29 Feb 2020 05:00), Max: 37.4 (28 Feb 2020 14:15)  T(F): 98 (29 Feb 2020 05:00), Max: 99.3 (28 Feb 2020 14:15)  HR: 65 (29 Feb 2020 05:00) (65 - 81)  BP: 135/90 (29 Feb 2020 05:00) (118/76 - 140/95)  BP(mean): --  RR: 16 (29 Feb 2020 05:00) (16 - 100)  SpO2: 99% (29 Feb 2020 05:00) (98% - 99%)    CONSTITUTIONAL: NAD, well-developed, pleasant, sleeping  RESPIRATORY: Normal respiratory effort; lungs are clear to auscultation bilaterally  CARDIOVASCULAR: Regular rate and rhythm, normal S1 and S2, no murmur/rub/gallop; No lower extremity edema; Peripheral pulses are 2+ bilaterally  ABDOMEN: Nontender to palpation, normoactive bowel sounds, no rebound/guarding; No hepatosplenomegaly  MUSCLOSKELETAL: no clubbing or cyanosis of digits; no joint swelling or tenderness to palpation    LABS:                        10.9   6.70  )-----------( 311      ( 29 Feb 2020 05:33 )             32.3     02-29    137  |  100  |  23  ----------------------------<  102<H>  3.8   |  23  |  1.39<H>    Ca    10.1      29 Feb 2020 05:33  Phos  3.9     02-29  Mg     2.1     02-29                  RADIOLOGY & ADDITIONAL TESTS:  Results Reviewed:   Imaging Personally Reviewed:  Electrocardiogram Personally Reviewed:    COORDINATION OF CARE:  Care Discussed with Consultants/Other Providers [Y/N]:  Prior or Outpatient Records Reviewed [Y/N]:

## 2020-03-01 LAB
ANION GAP SERPL CALC-SCNC: 13 MMO/L — SIGNIFICANT CHANGE UP (ref 7–14)
BASE EXCESS BLDV CALC-SCNC: -0.2 MMOL/L — SIGNIFICANT CHANGE UP
BLOOD GAS VENOUS - CREATININE: 1.4 MG/DL — HIGH (ref 0.5–1.3)
BLOOD GAS VENOUS - FIO2: 21 — SIGNIFICANT CHANGE UP
BUN SERPL-MCNC: 23 MG/DL — SIGNIFICANT CHANGE UP (ref 7–23)
CALCIUM SERPL-MCNC: 9.7 MG/DL — SIGNIFICANT CHANGE UP (ref 8.4–10.5)
CHLORIDE BLDV-SCNC: 106 MMOL/L — SIGNIFICANT CHANGE UP (ref 96–108)
CHLORIDE SERPL-SCNC: 101 MMOL/L — SIGNIFICANT CHANGE UP (ref 98–107)
CO2 SERPL-SCNC: 24 MMOL/L — SIGNIFICANT CHANGE UP (ref 22–31)
CREAT SERPL-MCNC: 1.29 MG/DL — SIGNIFICANT CHANGE UP (ref 0.5–1.3)
GAS PNL BLDV: 136 MMOL/L — SIGNIFICANT CHANGE UP (ref 136–146)
GLUCOSE BLDV-MCNC: 100 MG/DL — HIGH (ref 70–99)
GLUCOSE SERPL-MCNC: 102 MG/DL — HIGH (ref 70–99)
HCO3 BLDV-SCNC: 25 MMOL/L — SIGNIFICANT CHANGE UP (ref 20–27)
HCT VFR BLD CALC: 32.5 % — LOW (ref 39–50)
HCT VFR BLDV CALC: 34.5 % — LOW (ref 39–51)
HGB BLD-MCNC: 10.7 G/DL — LOW (ref 13–17)
HGB BLDV-MCNC: 11.2 G/DL — LOW (ref 13–17)
LACTATE BLDV-MCNC: 1.7 MMOL/L — SIGNIFICANT CHANGE UP (ref 0.5–2)
MAGNESIUM SERPL-MCNC: 2.1 MG/DL — SIGNIFICANT CHANGE UP (ref 1.6–2.6)
MCHC RBC-ENTMCNC: 29.8 PG — SIGNIFICANT CHANGE UP (ref 27–34)
MCHC RBC-ENTMCNC: 32.9 % — SIGNIFICANT CHANGE UP (ref 32–36)
MCV RBC AUTO: 90.5 FL — SIGNIFICANT CHANGE UP (ref 80–100)
NRBC # FLD: 0 K/UL — SIGNIFICANT CHANGE UP (ref 0–0)
PCO2 BLDV: 35 MMHG — LOW (ref 41–51)
PH BLDV: 7.44 PH — HIGH (ref 7.32–7.43)
PHOSPHATE SERPL-MCNC: 3.8 MG/DL — SIGNIFICANT CHANGE UP (ref 2.5–4.5)
PLATELET # BLD AUTO: 323 K/UL — SIGNIFICANT CHANGE UP (ref 150–400)
PMV BLD: 10 FL — SIGNIFICANT CHANGE UP (ref 7–13)
PO2 BLDV: 185 MMHG — HIGH (ref 35–40)
POTASSIUM BLDV-SCNC: 3.6 MMOL/L — SIGNIFICANT CHANGE UP (ref 3.4–4.5)
POTASSIUM SERPL-MCNC: 3.6 MMOL/L — SIGNIFICANT CHANGE UP (ref 3.5–5.3)
POTASSIUM SERPL-SCNC: 3.6 MMOL/L — SIGNIFICANT CHANGE UP (ref 3.5–5.3)
RBC # BLD: 3.59 M/UL — LOW (ref 4.2–5.8)
RBC # FLD: 13.5 % — SIGNIFICANT CHANGE UP (ref 10.3–14.5)
SAO2 % BLDV: 99.5 % — HIGH (ref 60–85)
SODIUM SERPL-SCNC: 138 MMOL/L — SIGNIFICANT CHANGE UP (ref 135–145)
VANCOMYCIN TROUGH SERPL-MCNC: 23.1 UG/ML — HIGH (ref 10–20)
WBC # BLD: 7.7 K/UL — SIGNIFICANT CHANGE UP (ref 3.8–10.5)
WBC # FLD AUTO: 7.7 K/UL — SIGNIFICANT CHANGE UP (ref 3.8–10.5)

## 2020-03-01 PROCEDURE — 70450 CT HEAD/BRAIN W/O DYE: CPT | Mod: 26

## 2020-03-01 PROCEDURE — 99233 SBSQ HOSP IP/OBS HIGH 50: CPT

## 2020-03-01 PROCEDURE — 95816 EEG AWAKE AND DROWSY: CPT | Mod: 26

## 2020-03-01 RX ADMIN — CHLORHEXIDINE GLUCONATE 15 MILLILITER(S): 213 SOLUTION TOPICAL at 18:34

## 2020-03-01 RX ADMIN — ATORVASTATIN CALCIUM 10 MILLIGRAM(S): 80 TABLET, FILM COATED ORAL at 22:34

## 2020-03-01 RX ADMIN — Medication 400 MILLIGRAM(S): at 13:17

## 2020-03-01 RX ADMIN — LEVETIRACETAM 1000 MILLIGRAM(S): 250 TABLET, FILM COATED ORAL at 09:20

## 2020-03-01 RX ADMIN — Medication 1 PATCH: at 09:09

## 2020-03-01 RX ADMIN — Medication 400 MILLIGRAM(S): at 22:34

## 2020-03-01 RX ADMIN — Medication 100 MILLIGRAM(S): at 22:34

## 2020-03-01 RX ADMIN — FAMOTIDINE 20 MILLIGRAM(S): 10 INJECTION INTRAVENOUS at 13:18

## 2020-03-01 RX ADMIN — AMLODIPINE BESYLATE 10 MILLIGRAM(S): 2.5 TABLET ORAL at 13:14

## 2020-03-01 RX ADMIN — Medication 1 PATCH: at 20:35

## 2020-03-01 RX ADMIN — Medication 3 MILLIGRAM(S): at 22:34

## 2020-03-01 RX ADMIN — OLANZAPINE 5 MILLIGRAM(S): 15 TABLET, FILM COATED ORAL at 22:34

## 2020-03-01 RX ADMIN — Medication 100 MILLIGRAM(S): at 13:16

## 2020-03-01 RX ADMIN — LEVETIRACETAM 1000 MILLIGRAM(S): 250 TABLET, FILM COATED ORAL at 22:34

## 2020-03-01 RX ADMIN — Medication 50 MILLIGRAM(S): at 13:14

## 2020-03-01 RX ADMIN — Medication 250 MILLIGRAM(S): at 13:19

## 2020-03-01 NOTE — PROVIDER CONTACT NOTE (CHANGE IN STATUS NOTIFICATION) - ASSESSMENT
Pt unarousable to stimuli despite repeated sternal rub. Pt was previously alert and arousable. Change in mental status noted during Neuro checks. Pt unable to open eyes and answer questions. Pupils were reactive. VS were stable. Blood glucose was stable. Pt unarousable to stimuli despite repeated sternal rub. Pt was previously alert and arousable. Change in mental status noted during Neuro checks. Pt unable to open eyes and answer questions. Pupils were reactive. VS were stable. Blood glucose was stable. Pt occasionally choked and coughed.

## 2020-03-01 NOTE — PROGRESS NOTE ADULT - ASSESSMENT
Assessment: 40 year old male with PMHx of prior R BG infarct (with R on CT head at [facility]), HTN who presents as a transfer from [facility] for L thalamic bleed (approx 1.5 cm on 2 pm 2/18 scan with vasogenic edema) with IVH, CTA H&N unremarkable. He has a persistent L sided deficit at baseline, and lives with father. On day of presentation father noted that pt was not talking and became obtunded 5 min before EMS arrived. Prior to arrival to [facility], he was witnessed to have 1 GTC, and was intubated after. He is not on antithrombotic, and home meds include lisinopril, labetalol and amlodipine. No recent illnesses, no trauma. He was transferred to [facility] (no bed availability at [facility]) for vEEG and further management  ICH score 3, MRS unknown.     patient had rapid response for not responding as much around 4a 3/1 with CTH noncon showing improvement in hemorrhage.    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 Libman in AM

## 2020-03-01 NOTE — CHART NOTE - NSCHARTNOTEFT_GEN_A_CORE
pt is known to neuro resident - discussed events of early this am RRT - requesting MRI which is ordered and can order repeat routine eeg - pt had 24h eeg recently.  neuro will see pt later today or jamin am with note to follow

## 2020-03-01 NOTE — RAPID RESPONSE TEAM SUMMARY - NSSITUATIONBACKGROUNDRRT_GEN_ALL_CORE
40M w/HTN and ICH in September 2019 (with residual LUE and LLE weakness) transferred from  where he presented with AMS and unresponsiveness, intubated for airway protection, fd to have acute ICH.    RRT called for change in mental status, VSS, , on exam patient arousable to sternal rub, waxing/waning mental status, PERRLA, unable to do complete neuro exam as patient not following commands. Last received morphine 2mg IV at 7PM, SCr. stable, given waxing/waning mental status and LNK at 11PM low suspicion for narcotics attributing to mental status, narcan not administered. Concern for neurological process given hx of ICH, plan to repeat CTH. Patient was previously in the MICU - MICU team at bedside, agreeable with repeat CTH.     Plan -  - repeat CTH  - primary team to notify attending

## 2020-03-01 NOTE — PROGRESS NOTE ADULT - SUBJECTIVE AND OBJECTIVE BOX
***NOTE IS INCOMPLETE AT THIS TIME*** SUBJECTIVE: patient was seen and examined at bedside, family members at bedside. was notified by primary team that patient had a rapid response 4a 3/1/20, he was noted not to wake up as much, however today after examination he is awake and looks improved as compared to prior neuological examination.     MEDICATIONS (HOME):  Home Medications:  amLODIPine 10 mg oral tablet: 1 tab(s) orally once a day (19 Feb 2020 15:42)  atorvastatin 10 mg oral tablet: 1 tab(s) orally once a day (19 Feb 2020 15:42)  famotidine 20 mg oral tablet: 1 cap(s) orally 2 times a day (19 Feb 2020 15:42)  labetalol 200 mg oral tablet: 1 tab(s) orally 2 times a day (19 Feb 2020 15:42)    MEDICATIONS  (STANDING):  amitriptyline 50 milliGRAM(s) Oral daily  amLODIPine   Tablet 10 milliGRAM(s) Oral daily  atorvastatin 10 milliGRAM(s) Oral at bedtime  chlorhexidine 0.12% Liquid 15 milliLiter(s) Oral Mucosa two times a day  cloNIDine Patch 0.1 mG/24Hr(s) 1 patch Transdermal every 7 days  famotidine   Suspension 20 milliGRAM(s) Oral daily  hydrALAZINE 100 milliGRAM(s) Oral three times a day  labetalol 400 milliGRAM(s) Oral every 8 hours  levETIRAcetam 1000 milliGRAM(s) Oral two times a day  melatonin 3 milliGRAM(s) Oral at bedtime  OLANZapine 5 milliGRAM(s) Oral at bedtime  vancomycin  IVPB 1000 milliGRAM(s) IV Intermittent <User Schedule>    MEDICATIONS  (PRN):  hydrALAZINE Injectable 10 milliGRAM(s) IV Push once PRN hypertension SBP>180  morphine  - Injectable 2 milliGRAM(s) IV Push every 6 hours PRN Severe Pain (7 - 10)    ALLERGIES/INTOLERANCES:  Allergies  No Known Allergies    VITALS & EXAMINATION:  Vital Signs Last 24 Hrs  T(C): 36.5 (01 Mar 2020 13:20), Max: 36.8 (01 Mar 2020 05:08)  T(F): 97.7 (01 Mar 2020 13:20), Max: 98.3 (01 Mar 2020 05:08)  HR: 77 (01 Mar 2020 13:20) (58 - 77)  BP: 141/95 (01 Mar 2020 13:20) (116/79 - 143/87)  BP(mean): --  RR: 16 (01 Mar 2020 13:20) (16 - 16)  SpO2: 98% (01 Mar 2020 13:20) (98% - 100%)    Neurological (>12):  MS: eyes open with left gaze preference, eyes can cross midline upon EOM testing, patient's voice is louder than prior examination, able to tell me his name, age, location, can show us 2 fingers when asked    CNs: VFF. EOMI no nystagmus. V1-3 intact to LT/pinprick. severe left facial droop with forehead involvement (baseline). Hearing grossly normal (rubbing fingers) b/l.     Motor: left arm and leg plegic (baseline); can lift right arm to antigravity though has difficulty maintaining, right leg antigravity - the right arm and leg are showing improvement as compared to prior examination	     Sensation: Intact to LT b/l throughout.     LABORATORY:  CBC                       10.7   7.70  )-----------( 323      ( 01 Mar 2020 05:44 )             32.5     Chem 03-01    138  |  101  |  23  ----------------------------<  102<H>  3.6   |  24  |  1.29    Ca    9.7      01 Mar 2020 05:44  Phos  3.8     03-01  Mg     2.1     03-01    STUDIES & IMAGING:  Studies (EKG, EEG, EMG, etc):     Radiology (XR, CT, MR, U/S, TTE/JENNA):    < from: CT Head No Cont (03.01.20 @ 04:39) >    IMPRESSION:    Left thalamocapsular hemorrhage decreased from prior CT on 2/22/2020. No new acute hemorrhage.    < end of copied text >

## 2020-03-01 NOTE — PROVIDER CONTACT NOTE (CHANGE IN STATUS NOTIFICATION) - ACTION/TREATMENT ORDERED:
Rapid response called. PA at bedside. CT of head was ordered. Pt taken for stat CT. WIll continue to monitor. Rapid response called. PA at bedside. CT of head was ordered. Pt taken for stat CT. Suction ready. Hold AM meds as per PA. WIll continue to monitor.

## 2020-03-01 NOTE — PROGRESS NOTE ADULT - PROBLEM SELECTOR PLAN 1
Hx of R basal ganglia hemorrhagic stroke, L thalamus ischemic stroke and R parietal density no p/w worsening mental status found to have 1.5 cm acute L thalamic IPH with vasogenic edema  - 24h head CT showed stable hemorrhage  - elevated HOB 30 degrees   - keppra increased to1g bid per neuro recs  - Goal SBP <160  - labetalol increased to 400mg BID  - q4h neuro checks  - EEG d'cd: no seizure seen on EEG, but noted to have potential epileptogenic focus in the right parietal region, cortical dysfunction and structural abnormality in the right hemisphere, and mild nonspecific diffuse or multifocal cerebral dysfunction  - MRI brain w, w/o pending  - TTE showed increased LV wall thickness, bubble study was unsuccessful due to poor visualization.   - LE duplex negative for DVTs  - f/u hypercoagulable work up - results so far show decreased protein S activity, low factor II assay, high factor VIII assay    Early on 3/1 RRT called as patient was less responsive than usual. Emergent CT obtained, no new findings noted. Neuro team to be reconsulted.

## 2020-03-01 NOTE — PROVIDER CONTACT NOTE (CHANGE IN STATUS NOTIFICATION) - SITUATION
Pt unarousable to stimuli despite repeated sternal rub. Pt was previously alert and arousable. Change in mental status noted during Neuro checks.

## 2020-03-01 NOTE — PROGRESS NOTE ADULT - SUBJECTIVE AND OBJECTIVE BOX
PROGRESS NOTE:     Patient is a 40y old  Male who presents with a chief complaint of hemorrhagic cva (29 Feb 2020 08:21)      SUBJECTIVE / OVERNIGHT EVENTS:  Patient seen and examined this morning. Overnight RRT called due to unresponsiveness. This morning the patient seems at his baseline.     ADDITIONAL REVIEW OF SYSTEMS:  No fevers or chills     MEDICATIONS  (STANDING):  amitriptyline 50 milliGRAM(s) Oral daily  amLODIPine   Tablet 10 milliGRAM(s) Oral daily  atorvastatin 10 milliGRAM(s) Oral at bedtime  chlorhexidine 0.12% Liquid 15 milliLiter(s) Oral Mucosa two times a day  cloNIDine Patch 0.1 mG/24Hr(s) 1 patch Transdermal every 7 days  famotidine   Suspension 20 milliGRAM(s) Oral daily  hydrALAZINE 100 milliGRAM(s) Oral three times a day  labetalol 400 milliGRAM(s) Oral every 8 hours  levETIRAcetam 1000 milliGRAM(s) Oral two times a day  melatonin 3 milliGRAM(s) Oral at bedtime  OLANZapine 5 milliGRAM(s) Oral at bedtime  vancomycin  IVPB 1000 milliGRAM(s) IV Intermittent <User Schedule>    MEDICATIONS  (PRN):  hydrALAZINE Injectable 10 milliGRAM(s) IV Push once PRN hypertension SBP>180  morphine  - Injectable 2 milliGRAM(s) IV Push every 6 hours PRN Severe Pain (7 - 10)      CAPILLARY BLOOD GLUCOSE      POCT Blood Glucose.: 107 mg/dL (01 Mar 2020 04:07)    I&O's Summary    29 Feb 2020 07:01  -  01 Mar 2020 07:00  --------------------------------------------------------  IN: 0 mL / OUT: 325 mL / NET: -325 mL        PHYSICAL EXAM:  Vital Signs Last 24 Hrs  T(C): 36.5 (29 Feb 2020 20:08), Max: 36.9 (29 Feb 2020 13:48)  T(F): 97.7 (29 Feb 2020 20:08), Max: 98.4 (29 Feb 2020 13:48)  HR: 64 (29 Feb 2020 21:32) (64 - 76)  BP: 120/85 (29 Feb 2020 21:32) (120/85 - 143/87)  BP(mean): --  RR: 16 (29 Feb 2020 21:32) (16 - 17)  SpO2: 100% (29 Feb 2020 21:32) (94% - 100%)    CONSTITUTIONAL: NAD, well-developed  RESPIRATORY: Normal respiratory effort; lungs are clear to auscultation bilaterally  CARDIOVASCULAR: Regular rate and rhythm, normal S1 and S2, no murmur/rub/gallop; No lower extremity edema; Peripheral pulses are 2+ bilaterally  ABDOMEN: Nontender to palpation, normoactive bowel sounds, no rebound/guarding; No hepatosplenomegaly  MUSCLOSKELETAL: no clubbing or cyanosis of digits; no joint swelling or tenderness to palpation    LABS:                        10.7   7.70  )-----------( 323      ( 01 Mar 2020 05:44 )             32.5     03-01    138  |  101  |  23  ----------------------------<  102<H>  3.6   |  24  |  1.29    Ca    9.7      01 Mar 2020 05:44  Phos  3.8     03-01  Mg     2.1     03-01                  RADIOLOGY & ADDITIONAL TESTS:  Results Reviewed:   Imaging Personally Reviewed:  Electrocardiogram Personally Reviewed:    COORDINATION OF CARE:  Care Discussed with Consultants/Other Providers [Y/N]:  Prior or Outpatient Records Reviewed [Y/N]:

## 2020-03-01 NOTE — CHART NOTE - NSCHARTNOTEFT_GEN_A_CORE
RRT was called Due to Acute Mental status Change. Pt was lethargy and responsive to painful stimuli, waxing/waning mental status. Per Nurse Pt received 2mg IVP morphine at 7PM, less likely for morphine to be attributing mental status change.  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     Plan:  - Will get STAT CT Head  non con  - MR Head ordered  - Will get morning labs  - will continue monitoring RRT was called Due to Acute Mental status Change. Pt was lethargy and responsive to painful stimuli, waxing/waning mental status. Per Nurse Pt received 2mg IVP morphine at 7PM, less likely for morphine to be attributing mental status change.  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     Plan:  - Will get STAT CT Head  non con  - MR Head ordered  - Will get morning labs+ VBG  - will continue monitoring    Attending  made aware RRT was called Due to Acute Mental status Change. Pt was lethargy and responsive to painful stimuli, waxing/waning mental status. Per Nurse Pt received 2mg IVP morphine at 7PM, less likely for morphine to be attributing mental status change.  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     Plan:  - Will get STAT CT Head  non con  - MR Head ordered  - Will get morning labs+ VBG  - will continue monitoring    Attending  made aware      Vital Signs Last 24 Hrs  T(C): 36.5 (29 Feb 2020 20:08), Max: 36.9 (29 Feb 2020 13:48)  T(F): 97.7 (29 Feb 2020 20:08), Max: 98.4 (29 Feb 2020 13:48)  HR: 64 (29 Feb 2020 21:32) (64 - 76)  BP: 120/85 (29 Feb 2020 21:32) (120/85 - 143/87)  BP(mean): --  RR: 16 (29 Feb 2020 21:32) (16 - 17)  SpO2: 100% (29 Feb 2020 21:32) (94% - 100%) RRT was called Due to Acute Mental status Change. Pt was lethargy and responsive to painful stimuli, waxing/waning mental status. Per Nurse Pt received 2mg IVP morphine at 7PM, less likely for morphine to be attributing mental status change.  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     Plan:  - Will get STAT CT Head  non con  - MR Head ordered  - Will get morning labs+ VBG  - will continue monitoring    Attending  made aware    CT Head:  Prelim:  INTERPRETATION:  Left basal ganglia hemorrhage decreased from prior CT on 2/22/2020. No new acute hemorrhage.     Vital Signs Last 24 Hrs  T(C): 36.5 (29 Feb 2020 20:08), Max: 36.9 (29 Feb 2020 13:48)  T(F): 97.7 (29 Feb 2020 20:08), Max: 98.4 (29 Feb 2020 13:48)  HR: 64 (29 Feb 2020 21:32) (64 - 76)  BP: 120/85 (29 Feb 2020 21:32) (120/85 - 143/87)  BP(mean): --  RR: 16 (29 Feb 2020 21:32) (16 - 17)  SpO2: 100% (29 Feb 2020 21:32) (94% - 100%) RRT was called Due to Acute Mental status Change. Pt was lethargy and responsive to painful stimuli, waxing/waning mental status. Per Nurse Pt received 2mg IVP morphine at 7PM, less likely for morphine to be attributing mental status change.  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     Plan:  - Will get STAT CT Head  non con  - MR Head ordered  - Will get morning labs+ VBG  - will continue monitoring    Attending  made aware    CT Head:  Prelim:  INTERPRETATION:  Left basal ganglia hemorrhage decreased from prior CT on 2/22/2020. No new acute hemorrhage.     Vital Signs Last 24 Hrs  T(C): 36.5 (29 Feb 2020 20:08), Max: 36.9 (29 Feb 2020 13:48)  T(F): 97.7 (29 Feb 2020 20:08), Max: 98.4 (29 Feb 2020 13:48)  HR: 64 (29 Feb 2020 21:32) (64 - 76)  BP: 120/85 (29 Feb 2020 21:32) (120/85 - 143/87)  BP(mean): --  RR: 16 (29 Feb 2020 21:32) (16 - 17)  SpO2: 100% (29 Feb 2020 21:32) (94% - 100%)      ***** 2 hour post RRT check up****    - Pt was seen and examined , Pt is Aox3 , responsive to verbal commends. Pt is asymptomatic, Vitals are stable. CT head prelim showed decrease ICH.  - Pt's Brother at the bedside, who states that Pt has residual Left sided weakness from previous stroke.  - Neuro exam was conducted, Pt noted to have 1/5 RLE Strength test and 4/5 RUE Strength test  and 1/5 LUE and 1/5 LLE. Sensation Intact   - Day team to follow up Morning labs.  - will call neurology and update them regarding mental status and new neuro finding. RRT was called Due to Acute Mental status Change. Pt was lethargy and responsive to painful stimuli, waxing/waning mental status. Per Nurse Pt received 2mg IVP morphine at 7PM, less likely for morphine to be attributing mental status change.  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     Plan:  - Will get STAT CT Head  non con  - MR Head ordered  - Will get morning labs+ VBG  - will continue monitoring    Attending  made aware    CT Head:  Prelim:  INTERPRETATION:  Left basal ganglia hemorrhage decreased from prior CT on 2/22/2020. No new acute hemorrhage.     Vital Signs Last 24 Hrs  T(C): 36.5 (29 Feb 2020 20:08), Max: 36.9 (29 Feb 2020 13:48)  T(F): 97.7 (29 Feb 2020 20:08), Max: 98.4 (29 Feb 2020 13:48)  HR: 64 (29 Feb 2020 21:32) (64 - 76)  BP: 120/85 (29 Feb 2020 21:32) (120/85 - 143/87)  BP(mean): --  RR: 16 (29 Feb 2020 21:32) (16 - 17)  SpO2: 100% (29 Feb 2020 21:32) (94% - 100%)      ***** 2 hour post RRT check up****    - Pt was seen and examined , Pt is Aox3 , responsive to verbal commends. Pt is asymptomatic, Vitals are stable. CT head prelim showed decrease ICH.  - Pt's Brother at the bedside, who states that Pt has residual Left sided weakness from previous stroke.  - Neuro exam was conducted, Pt noted to have 1/5 RLE Strength test and 4/5 RUE Strength test  and 1/5 LUE and 1/5 LLE. Sensation Intact . Pt is Unable to move RLE Vertical or horizontal plane  - Day team to follow up Morning labs.  - will call neurology and update them regarding mental status and new neuro finding. RRT was called Due to Acute Mental status Change. Pt was lethargy and responsive to painful stimuli, waxing/waning mental status. Per Nurse Pt received 2mg IVP morphine at 7PM, less likely for morphine to be attributing mental status change.  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     Plan:  - Will get STAT CT Head  non con  - MR Head ordered  - Will get morning labs+ VBG  - will continue monitoring    Attending  made aware    CT Head:  Prelim:  INTERPRETATION:  Left basal ganglia hemorrhage decreased from prior CT on 2/22/2020. No new acute hemorrhage.     Vital Signs Last 24 Hrs  T(C): 36.5 (29 Feb 2020 20:08), Max: 36.9 (29 Feb 2020 13:48)  T(F): 97.7 (29 Feb 2020 20:08), Max: 98.4 (29 Feb 2020 13:48)  HR: 64 (29 Feb 2020 21:32) (64 - 76)  BP: 120/85 (29 Feb 2020 21:32) (120/85 - 143/87)  BP(mean): --  RR: 16 (29 Feb 2020 21:32) (16 - 17)  SpO2: 100% (29 Feb 2020 21:32) (94% - 100%)      ***** 2 hour post RRT check up****    - Pt was seen and examined , Pt is Aox3 , responsive to verbal commends. Pt is asymptomatic, Vitals are stable. CT head prelim showed decrease ICH.  - Pt's Brother at the bedside, who states that Pt has residual Left sided weakness from previous stroke.  - Neuro exam was conducted, Pt noted to have 1/5 RLE Strength test and 4/5 RUE Strength test  and 1/5 LUE and 1/5 LLE. Sensation Intact . Pt is Unable to move RLE Vertical or horizontal plane  - Day team to follow up Morning labs.  - will call neurology and update them regarding mental status and new neuro finding.    Case was discussed with Dr. Nguyen and He was updated, recommends to reconsult Neuro and Get EEG

## 2020-03-01 NOTE — EEG REPORT - NS EEG TEXT BOX
[facility]   COMPREHENSIVE EPILEPSY CENTER   REPORT OF ROUTINE EEG W/ VIDEO    [facility]: [address], [place] [address], Ph#: [phone]  [facility]: [address] [place] [address], Ph#: [phone]  [facility]: [address] [place] [address]    Patient Name: LUIS EDUARDO FRANKEL  Age and : 40y (82)  MRN #: [id number]  Location: [place] [address] A  Referring Physician: Gary Meza    Study Date: 20    _____________________________________________________________  TECHNICAL INFORMATION    Placement and Labeling of Electrodes:  The EEG was performed utilizing 20 channels referential EEG connections (coronal over temporal over parasagittal montage) using all standard 10-20 electrode placements with EKG.  Recording was at a sampling rate of 256 samples per second per channel.  Time synchronized digital video recording was done simultaneously with EEG recording.  A low light infrared camera was used for low light recording.  Christopher and seizure detection algorithms were utilized.    _____________________________________________________________  HISTORY    Patient is a 40y old  Male who presents with a chief complaint of hemorrhagic cva (01 Mar 2020 08:57)      PERTINENT MEDICATION:  MEDICATIONS  (STANDING):  amitriptyline 50 milliGRAM(s) Oral daily  amLODIPine   Tablet 10 milliGRAM(s) Oral daily  atorvastatin 10 milliGRAM(s) Oral at bedtime  chlorhexidine 0.12% Liquid 15 milliLiter(s) Oral Mucosa two times a day  cloNIDine Patch 0.1 mG/24Hr(s) 1 patch Transdermal every 7 days  famotidine   Suspension 20 milliGRAM(s) Oral daily  hydrALAZINE 100 milliGRAM(s) Oral three times a day  labetalol 400 milliGRAM(s) Oral every 8 hours  levETIRAcetam 1000 milliGRAM(s) Oral two times a day  melatonin 3 milliGRAM(s) Oral at bedtime  OLANZapine 5 milliGRAM(s) Oral at bedtime  vancomycin  IVPB 1000 milliGRAM(s) IV Intermittent <User Schedule>  ___________________________________________________________________________________________________  STUDY INTERPRETATION    Findings: The background was continuous, spontaneously variable and reactive. During wakefulness, the posterior dominant rhythm consisted of asymmetric, poorly-modulated 8.5-9 Hz activity, with amplitude to 30 uV, that was seen over the left hemisphere.     Background Slowing:  None.    Focal Slowing:   Continuous polymorphic delta slowing in the right hemisphere.     Sleep Background:  Drowsiness was characterized by fragmentation, attenuation, and slowing of the background activity.    Sleep was characterized by the presence of asymmetric, rudimentary spindles and K-complexes seen better over the left hemisphere.    Other Non-Epileptiform Findings:  Breach effect in right hemisphere, max parietally, characterized by higher amplitude and sharply contoured waves.     Interictal Epileptiform Activity:   None.    Events:  Clinical events: One push button event for unclear clinical reasons.     Activation Procedures:   Hyperventilation was not performed.    Photic stimulation was performed and did not elicit any abnormality.     Artifacts:  Intermittent myogenic and movement artifacts were noted.    ECG:  The heart rate on single channel ECG was predominantly between 60-70 BPM.    _____________________________________________________________  EEG SUMMARY/CLASSIFICATION    Abnormal EEG in an altered patient.  - Continuous polymorphic delta slowing in the right hemisphere.   - Mild generalized slowing.  - Breach effect in right hemisphere, max parietally, characterized by higher amplitude and sharply contoured waves.     _____________________________________________________________  EEG IMPRESSION/CLINICAL CORRELATE    Abnormal EEG study.  1. Structural abnormality in the right hemisphere.  2. Mild nonspecific diffuse or multifocal cerebral dysfunction.   3. No seizure seen.  4. 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       Kenyatta Batres DO  Epilepsy Fellow, [facility] [place]

## 2020-03-02 LAB
ANION GAP SERPL CALC-SCNC: 13 MMO/L — SIGNIFICANT CHANGE UP (ref 7–14)
BUN SERPL-MCNC: 22 MG/DL — SIGNIFICANT CHANGE UP (ref 7–23)
CALCIUM SERPL-MCNC: 9.5 MG/DL — SIGNIFICANT CHANGE UP (ref 8.4–10.5)
CHLORIDE SERPL-SCNC: 99 MMOL/L — SIGNIFICANT CHANGE UP (ref 98–107)
CO2 SERPL-SCNC: 24 MMOL/L — SIGNIFICANT CHANGE UP (ref 22–31)
CREAT SERPL-MCNC: 1.16 MG/DL — SIGNIFICANT CHANGE UP (ref 0.5–1.3)
DNA PLOIDY SPEC FC-IMP: NORMAL — SIGNIFICANT CHANGE UP
GLUCOSE SERPL-MCNC: 88 MG/DL — SIGNIFICANT CHANGE UP (ref 70–99)
HCT VFR BLD CALC: 32 % — LOW (ref 39–50)
HGB BLD-MCNC: 10.7 G/DL — LOW (ref 13–17)
MAGNESIUM SERPL-MCNC: 2 MG/DL — SIGNIFICANT CHANGE UP (ref 1.6–2.6)
MCHC RBC-ENTMCNC: 30.2 PG — SIGNIFICANT CHANGE UP (ref 27–34)
MCHC RBC-ENTMCNC: 33.4 % — SIGNIFICANT CHANGE UP (ref 32–36)
MCV RBC AUTO: 90.4 FL — SIGNIFICANT CHANGE UP (ref 80–100)
MTHFR GENE MUT TESTED BLD/T: SIGNIFICANT CHANGE UP
NRBC # FLD: 0 K/UL — SIGNIFICANT CHANGE UP (ref 0–0)
PHOSPHATE SERPL-MCNC: 3.9 MG/DL — SIGNIFICANT CHANGE UP (ref 2.5–4.5)
PLATELET # BLD AUTO: 301 K/UL — SIGNIFICANT CHANGE UP (ref 150–400)
PMV BLD: 9.9 FL — SIGNIFICANT CHANGE UP (ref 7–13)
POTASSIUM SERPL-MCNC: 3.6 MMOL/L — SIGNIFICANT CHANGE UP (ref 3.5–5.3)
POTASSIUM SERPL-SCNC: 3.6 MMOL/L — SIGNIFICANT CHANGE UP (ref 3.5–5.3)
PTR INTERPRETATION: NORMAL — SIGNIFICANT CHANGE UP
RBC # BLD: 3.54 M/UL — LOW (ref 4.2–5.8)
RBC # FLD: 13.3 % — SIGNIFICANT CHANGE UP (ref 10.3–14.5)
SODIUM SERPL-SCNC: 136 MMOL/L — SIGNIFICANT CHANGE UP (ref 135–145)
VANCOMYCIN FLD-MCNC: 29.4 UG/ML — CRITICAL HIGH
WBC # BLD: 6.99 K/UL — SIGNIFICANT CHANGE UP (ref 3.8–10.5)
WBC # FLD AUTO: 6.99 K/UL — SIGNIFICANT CHANGE UP (ref 3.8–10.5)

## 2020-03-02 PROCEDURE — 70553 MRI BRAIN STEM W/O & W/DYE: CPT | Mod: 26

## 2020-03-02 PROCEDURE — 95819 EEG AWAKE AND ASLEEP: CPT | Mod: 26

## 2020-03-02 PROCEDURE — 99232 SBSQ HOSP IP/OBS MODERATE 35: CPT

## 2020-03-02 PROCEDURE — 99233 SBSQ HOSP IP/OBS HIGH 50: CPT

## 2020-03-02 RX ORDER — VANCOMYCIN HCL 1 G
750 VIAL (EA) INTRAVENOUS EVERY 12 HOURS
Refills: 0 | Status: DISCONTINUED | OUTPATIENT
Start: 2020-03-02 | End: 2020-03-10

## 2020-03-02 RX ADMIN — Medication 50 MILLIGRAM(S): at 13:41

## 2020-03-02 RX ADMIN — Medication 1 PATCH: at 06:10

## 2020-03-02 RX ADMIN — Medication 400 MILLIGRAM(S): at 13:41

## 2020-03-02 RX ADMIN — Medication 250 MILLIGRAM(S): at 23:18

## 2020-03-02 RX ADMIN — Medication 3 MILLIGRAM(S): at 21:53

## 2020-03-02 RX ADMIN — OLANZAPINE 5 MILLIGRAM(S): 15 TABLET, FILM COATED ORAL at 21:53

## 2020-03-02 RX ADMIN — LEVETIRACETAM 1000 MILLIGRAM(S): 250 TABLET, FILM COATED ORAL at 17:55

## 2020-03-02 RX ADMIN — Medication 250 MILLIGRAM(S): at 01:30

## 2020-03-02 RX ADMIN — ATORVASTATIN CALCIUM 10 MILLIGRAM(S): 80 TABLET, FILM COATED ORAL at 21:53

## 2020-03-02 RX ADMIN — Medication 400 MILLIGRAM(S): at 06:09

## 2020-03-02 RX ADMIN — Medication 100 MILLIGRAM(S): at 06:09

## 2020-03-02 RX ADMIN — Medication 400 MILLIGRAM(S): at 21:53

## 2020-03-02 RX ADMIN — CHLORHEXIDINE GLUCONATE 15 MILLILITER(S): 213 SOLUTION TOPICAL at 17:55

## 2020-03-02 RX ADMIN — AMLODIPINE BESYLATE 10 MILLIGRAM(S): 2.5 TABLET ORAL at 06:10

## 2020-03-02 RX ADMIN — LEVETIRACETAM 1000 MILLIGRAM(S): 250 TABLET, FILM COATED ORAL at 06:09

## 2020-03-02 RX ADMIN — Medication 100 MILLIGRAM(S): at 13:41

## 2020-03-02 RX ADMIN — Medication 1 PATCH: at 17:54

## 2020-03-02 RX ADMIN — FAMOTIDINE 20 MILLIGRAM(S): 10 INJECTION INTRAVENOUS at 17:55

## 2020-03-02 RX ADMIN — CHLORHEXIDINE GLUCONATE 15 MILLILITER(S): 213 SOLUTION TOPICAL at 06:10

## 2020-03-02 RX ADMIN — Medication 100 MILLIGRAM(S): at 21:53

## 2020-03-02 NOTE — PROVIDER CONTACT NOTE (CRITICAL VALUE NOTIFICATION) - ASSESSMENT
Patient asymptomatic.
no acute s/s of distress noted. VSS
patient resting in the bed, no s.s distress noted. family at the bedside
Vancomycin trough noted to be 27.4

## 2020-03-02 NOTE — PROGRESS NOTE ADULT - SUBJECTIVE AND OBJECTIVE BOX
40y old  Male who presents with a chief complaint of hemorrhagic cva (01 Mar 2020 08:57)      Interval history:  Afebrile, feels well, eating, no headaches, no cough.       Allergies:   No Known Allergies      Antimicrobials:  vancomycin  IVPB 750 milliGRAM(s) IV Intermittent every 12 hours      REVIEW OF SYSTEMS:  No chest pain   No cough, no SOB  No N/V/D, no abdominal pain  No dysuria or frequency  No rash.     Vital Signs Last 24 Hrs  T(C): 36.6 (03-02-20 @ 13:39), Max: 36.6 (03-02-20 @ 01:28)  T(F): 97.9 (03-02-20 @ 13:39), Max: 97.9 (03-02-20 @ 13:39)  HR: 69 (03-02-20 @ 13:39) (62 - 69)  BP: 143/89 (03-02-20 @ 13:39) (119/73 - 143/89)  BP(mean): --  RR: 17 (03-02-20 @ 13:39) (17 - 18)  SpO2: 100% (03-02-20 @ 13:39) (97% - 100%)    PHYSICAL EXAM:                            10.7   6.99  )-----------( 301      ( 02 Mar 2020 06:12 )             32.0   03-02    136  |  99  |  22  ----------------------------<  88  3.6   |  24  |  1.16    Ca    9.5      02 Mar 2020 06:12  Phos  3.9     03-02  Mg     2.0     03-02                Radiology: 40y old  Male who presents with a chief complaint of hemorrhagic cva (01 Mar 2020 08:57)      Interval history:  Afebrile, feels well, eating, no headaches, no cough.       Allergies:   No Known Allergies      Antimicrobials:  vancomycin  IVPB 750 milliGRAM(s) IV Intermittent every 12 hours      REVIEW OF SYSTEMS:  No chest pain   No SOB  No N/V  No rash.       Vital Signs Last 24 Hrs  T(C): 36.6 (03-02-20 @ 13:39), Max: 36.6 (03-02-20 @ 01:28)  T(F): 97.9 (03-02-20 @ 13:39), Max: 97.9 (03-02-20 @ 13:39)  HR: 69 (03-02-20 @ 13:39) (62 - 69)  BP: 143/89 (03-02-20 @ 13:39) (119/73 - 143/89)  BP(mean): --  RR: 17 (03-02-20 @ 13:39) (17 - 18)  SpO2: 100% (03-02-20 @ 13:39) (97% - 100%)      PHYSICAL EXAM:  Patient in no acute distress. Alert, awake.   Cardiovascular: S1S2 normal.  Lungs: + air entry B/L lung fields,   Gastrointestinal: soft, nontender, nondistended.  Extremities: no edema.  + arrow catheter in place  + condom catheter.                             10.7   6.99  )-----------( 301      ( 02 Mar 2020 06:12 )             32.0   03-02    136  |  99  |  22  ----------------------------<  88  3.6   |  24  |  1.16    Ca    9.5      02 Mar 2020 06:12  Phos  3.9     03-02  Mg     2.0     03-02        Culture - Blood in AM (02.23.20 @ 03:18)    Culture - Blood:   NO ORGANISMS ISOLATED    Specimen Source: BLOOD PERIPHERAL        Radiology:  < from: MR Head w/wo IV Cont (03.02.20 @ 09:43) >  Impression: Abnormal areas of hemorrhage as described above.     Encephalomalacia and gliosis as described above.        < from: CT Head No Cont (03.01.20 @ 04:39) >  IMPRESSION:    Left thalamocapsular hemorrhage decreased from prior CT on 2/22/2020. No new acute hemorrhage.

## 2020-03-02 NOTE — EEG REPORT - NS EEG TEXT BOX
[facility]   COMPREHENSIVE EPILEPSY CENTER   REPORT OF ROUTINE EEG W/ VIDEO    [facility]: [address], [place] [address], Ph#: [phone]  [facility]: [address] [place] [address], Ph#: [phone]  [facility]: [address] [place] [address]    Patient Name: LUIS EDUARDO FRANKEL  Age and : 40y (82)  MRN #: [id number]  Location: [place] [address] A  Referring Physician: Gary Meza    Study Date: 20    _____________________________________________________________  TECHNICAL INFORMATION    Placement and Labeling of Electrodes:  The EEG was performed utilizing 20 channels referential EEG connections (coronal over temporal over parasagittal montage) using all standard 10-20 electrode placements with EKG.  Recording was at a sampling rate of 256 samples per second per channel.  Time synchronized digital video recording was done simultaneously with EEG recording.  A low light infrared camera was used for low light recording.  Christopher and seizure detection algorithms were utilized.    _____________________________________________________________  HISTORY    Patient is a 40y old  Male who presents with a chief complaint of hemorrhagic cva (01 Mar 2020 08:57)      PERTINENT MEDICATION:  MEDICATIONS  (STANDING):  amitriptyline 50 milliGRAM(s) Oral daily  amLODIPine   Tablet 10 milliGRAM(s) Oral daily  atorvastatin 10 milliGRAM(s) Oral at bedtime  chlorhexidine 0.12% Liquid 15 milliLiter(s) Oral Mucosa two times a day  cloNIDine Patch 0.1 mG/24Hr(s) 1 patch Transdermal every 7 days  famotidine   Suspension 20 milliGRAM(s) Oral daily  hydrALAZINE 100 milliGRAM(s) Oral three times a day  labetalol 400 milliGRAM(s) Oral every 8 hours  levETIRAcetam 1000 milliGRAM(s) Oral two times a day  melatonin 3 milliGRAM(s) Oral at bedtime  OLANZapine 5 milliGRAM(s) Oral at bedtime  vancomycin  IVPB 750 milliGRAM(s) IV Intermittent every 12 hours    ___________________________________________________________________________________________________  STUDY INTERPRETATION    Findings: The background was continuous, spontaneously variable and reactive. During wakefulness, the posterior dominant rhythm consisted of asymmetric, poorly-modulated 8.5-9 Hz activity, with amplitude to 30 uV, that was seen better over the left hemisphere.     Background Slowing:  None.    Focal Slowing:   Continuous polymorphic delta slowing in the right hemisphere.     Sleep Background:  Drowsiness was characterized by fragmentation, attenuation, and slowing of the background activity.    Sleep was characterized by the presence of asymmetric, rudimentary spindles and K-complexes seen better over the left hemisphere.    Other Non-Epileptiform Findings:  Breach effect in right hemisphere (maximum over parietal region) characterized by higher amplitude and sharply contoured waves.     Interictal Epileptiform Activity:   None.    Events:  Clinical events: None.  Seizures: None.    Activation Procedures:   Hyperventilation was not performed.    Photic stimulation was performed and did not elicit any abnormality.     Artifacts:  Intermittent myogenic and movement artifacts were noted.    ECG:  The heart rate on single channel ECG was predominantly between 60-70 BPM.    _____________________________________________________________  EEG SUMMARY/CLASSIFICATION    Abnormal EEG in an altered patient.  - Continuous polymorphic delta slowing in the right hemisphere.   - Mild generalized slowing.  - Breach effect in right hemisphere (maximum over parietal region) characterized by higher amplitude and sharply contoured waves.     _____________________________________________________________  EEG IMPRESSION/CLINICAL CORRELATE    Abnormal EEG study.  1. Structural abnormality in the right hemisphere.  2. Mild nonspecific diffuse or multifocal cerebral dysfunction.   3. No seizure seen.  4. 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     Preliminary Fellow Report  _____________________________________________________________    Haylee Ruiz MD  Epilepsy Fellow, [facility] Epilepsy [facility]   COMPREHENSIVE EPILEPSY CENTER   REPORT OF ROUTINE EEG W/ VIDEO    [facility]: [address], [place] [address], Ph#: [phone]  [facility]: [address] [place] [address], Ph#: [phone]  [facility]: [address] [place] [address]    Patient Name: LUIS EDUARDO FRANKEL  Age and : 40y (82)  MRN #: [id number]  Location: [place] [address] A  Referring Physician: Gary Meza    Study Date: 20    _____________________________________________________________  TECHNICAL INFORMATION    Placement and Labeling of Electrodes:  The EEG was performed utilizing 20 channels referential EEG connections (coronal over temporal over parasagittal montage) using all standard 10-20 electrode placements with EKG.  Recording was at a sampling rate of 256 samples per second per channel.  Time synchronized digital video recording was done simultaneously with EEG recording.  A low light infrared camera was used for low light recording.  Christopher and seizure detection algorithms were utilized.    _____________________________________________________________  HISTORY    Patient is a 40y old  Male who presents with a chief complaint of hemorrhagic cva (01 Mar 2020 08:57)      PERTINENT MEDICATION:  MEDICATIONS  (STANDING):  amitriptyline 50 milliGRAM(s) Oral daily  amLODIPine   Tablet 10 milliGRAM(s) Oral daily  atorvastatin 10 milliGRAM(s) Oral at bedtime  chlorhexidine 0.12% Liquid 15 milliLiter(s) Oral Mucosa two times a day  cloNIDine Patch 0.1 mG/24Hr(s) 1 patch Transdermal every 7 days  famotidine   Suspension 20 milliGRAM(s) Oral daily  hydrALAZINE 100 milliGRAM(s) Oral three times a day  labetalol 400 milliGRAM(s) Oral every 8 hours  levETIRAcetam 1000 milliGRAM(s) Oral two times a day  melatonin 3 milliGRAM(s) Oral at bedtime  OLANZapine 5 milliGRAM(s) Oral at bedtime  vancomycin  IVPB 750 milliGRAM(s) IV Intermittent every 12 hours    ___________________________________________________________________________________________________  STUDY INTERPRETATION    Findings: The background was continuous, spontaneously variable and reactive. During wakefulness, the posterior dominant rhythm consisted of asymmetric, poorly-modulated 8.5-9 Hz activity, with amplitude to 30 uV, that was seen better over the left hemisphere.     Diffuse theta present, irregular  Continuous polymorphic delta slowing and attenuation over the right hemisphere.     Sleep Background:  Drowsiness was characterized by fragmentation, attenuation, and slowing of the background activity.    Sleep was characterized by the presence of asymmetric, rudimentary spindles and K-complexes seen better over the left hemisphere.    Interictal Epileptiform Activity:   None.    Events:  Clinical events: None.  Seizures: None.    Activation Procedures:   Hyperventilation was not performed.    Photic stimulation was performed and did not elicit any abnormality.     Artifacts:  Intermittent myogenic and movement artifacts were noted.    ECG:  The heart rate on single channel ECG was predominantly between 60-70 BPM.    _____________________________________________________________  EEG SUMMARY/CLASSIFICATION    Abnormal EEG in an altered patient.  - Continuous polymorphic delta slowing and attenuation over the right hemisphere.   - Mild generalized slowing.    _____________________________________________________________  EEG IMPRESSION/CLINICAL CORRELATE    Abnormal EEG study.  1. Structural abnormality in the right hemisphere involving white and grey matter.  2. Mild diffuse or multifocal cerebral dysfunction.   3. No seizure seen.    _____________________________________________________________    Haylee Ruiz MD  Epilepsy Fellow, [facility]    Alpesh Turner MD CARLEY  Director, Continuous EEG Monitoring Service, [facility]    and Epilepsy Fellowship , Department of Neurology  [facility]

## 2020-03-02 NOTE — PROGRESS NOTE ADULT - SUBJECTIVE AND OBJECTIVE BOX
Patient is a 40y old  Male who presents with a chief complaint of hemorrhagic cva (01 Mar 2020 08:57)    SUBJECTIVE / OVERNIGHT EVENTS:    No events overnight. This AM, patient without f/c/n/v/d/cp/sob.    MEDICATIONS  (STANDING):  amitriptyline 50 milliGRAM(s) Oral daily  amLODIPine   Tablet 10 milliGRAM(s) Oral daily  atorvastatin 10 milliGRAM(s) Oral at bedtime  chlorhexidine 0.12% Liquid 15 milliLiter(s) Oral Mucosa two times a day  cloNIDine Patch 0.1 mG/24Hr(s) 1 patch Transdermal every 7 days  famotidine   Suspension 20 milliGRAM(s) Oral daily  hydrALAZINE 100 milliGRAM(s) Oral three times a day  labetalol 400 milliGRAM(s) Oral every 8 hours  levETIRAcetam 1000 milliGRAM(s) Oral two times a day  melatonin 3 milliGRAM(s) Oral at bedtime  OLANZapine 5 milliGRAM(s) Oral at bedtime  vancomycin  IVPB 750 milliGRAM(s) IV Intermittent every 12 hours    MEDICATIONS  (PRN):  hydrALAZINE Injectable 10 milliGRAM(s) IV Push once PRN hypertension SBP>180  morphine  - Injectable 2 milliGRAM(s) IV Push every 6 hours PRN Severe Pain (7 - 10)      PHYSICAL EXAM:  T(C): 36.6 (03-02-20 @ 13:39), Max: 36.6 (03-02-20 @ 01:28)  HR: 69 (03-02-20 @ 13:39) (62 - 69)  BP: 143/89 (03-02-20 @ 13:39) (119/73 - 143/89)  RR: 17 (03-02-20 @ 13:39) (17 - 18)  SpO2: 100% (03-02-20 @ 13:39) (97% - 100%)  I&O's Summary    GENERAL: NAD, well-developed, lying in bed, pleasant  HEAD:  Atraumatic, Normocephalic, MMM  CHEST/LUNG: Clear to auscultation bilaterally; No wheeze  HEART: Regular rate and rhythm; No murmurs, rubs, or gallops  ABDOMEN: Soft, Nontender, Nondistended; Bowel sounds present  EXTREMITIES:  2+ Peripheral Pulses, No clubbing, cyanosis, or edema  PSYCH: AAOx3  NEUROLOGY: CN II-XII grossly intact, able to move R arm, minimal movement of R leg; no movement of L arm or L leg      LABS:  CAPILLARY BLOOD GLUCOSE                              10.7   6.99  )-----------( 301      ( 02 Mar 2020 06:12 )             32.0     03-02    136  |  99  |  22  ----------------------------<  88  3.6   |  24  |  1.16    Ca    9.5      02 Mar 2020 06:12  Phos  3.9     03-02  Mg     2.0     03-02                  RADIOLOGY & ADDITIONAL TESTS:    Telemetry Personally Reviewed -     Imaging Personally Reviewed -     Imaging Reviewed - MRI  brain -  Abnormal T2 prolongation in the periventricular white matter region is seen which is related to chronic microvascular ischemic changes.    Ischemia is also suspected involving the brainstem region.    Abnormal susceptibility seen involving the right lower [place] which could be compatible areas of old hemorrhage mineralization or small cavernous angiomas.    Nodular mucosal thickening seen involving right maxillary sinus.    Both mastoid and middle ear regions appear clear.    Consultant(s) Notes Reviewed -       Care Discussed with Consultants/Other Providers -

## 2020-03-02 NOTE — PROGRESS NOTE ADULT - PROBLEM SELECTOR PLAN 3
On vancomycin  F/U ID  CT lumbar spine/Thoracic done results above.    TTE without vegetation  Will need PICC line for a total of 4 weeks of antibiotics

## 2020-03-02 NOTE — PROGRESS NOTE ADULT - PROBLEM SELECTOR PLAN 1
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 Abnormal susceptibility seen involving the right lower kay[place] which could be compatible areas of old hemorrhage mineralization or small cavernous angiomas.  - TTE showed increased LV wall thickness, bubble study was unsuccessful due to poor visualization.   - LE duplex negative for DVTs  - f/u hypercoagulable work up - results so far show decreased protein S activity, low factor II assay, high factor VIII assay    Early on 3/1 RRT called as patient was less responsive than usual. Emergent CT obtained, no new findings noted.

## 2020-03-02 NOTE — PROGRESS NOTE ADULT - NSHPATTENDINGPLANDISCUSS_GEN_ALL_CORE
medicine attending
Dr. Hemal Nath
medicine PA
medicine pa
neurology team
patient , father at bedside and neurology and MICU teams.
MICU team

## 2020-03-02 NOTE — PROVIDER CONTACT NOTE (CRITICAL VALUE NOTIFICATION) - SITUATION
Blood cultures from 2/20 Staph Aureus MRSA
Pt vanco troph 29.4
Vanco Trough 31.2
Vancomycin trough noted to be 27.4

## 2020-03-02 NOTE — PROGRESS NOTE ADULT - ASSESSMENT
41 y/o M PMHx DMII, reported polysubstance abuse ( alcohol and cocaine), previous ICH in september 2019 with residual left sided weakness [facility] of [facility]responsiveness leading to diagnosis of new hemorrhagic CVA, now with persistent fevers, leukocytosis with bandemia, and MRSA bacteremia.      sepsis, fever, leucocytosis, tachycardia, due to MRSA Bacteremia, possible source phlebitis.   resolved sepsis, no fever or leucocytosis,      Plan:   -Bcx 1 of 4 (aerobic) positive for MRSA.   -Unclear source, possible phlebitis rt arm.   -CT with no obvious spinal involvement.   - repeat TTE with no obvious vegetation  -c/w Vanco for MRSA pneumonia, trough elevated today at 29, but collected much earlier, decrease dose to 750 mg iv q12h.   -monitor vancomycin trough and creatinine to avoid nephrotoxicity and ensure efficacy   -repeat blood cx NTD.   -will eventually need PICC, will need 4 weeks of therapy from the negative cx. until 3/24/20; on discharge CBC/BMP, vanco trough once a week while on OPAT.   -neuro following

## 2020-03-02 NOTE — PROVIDER CONTACT NOTE (CRITICAL VALUE NOTIFICATION) - BACKGROUND
Patient admitted for AMS and unresponsiveness. S/P extubation. Patient currently on Vanco 750mg 3x day for MRSA in blood
Patient admitted for non- traumatic hemmorhage PMH- CVA, HTN
pt admitted with hemorrhagic stroke and PNA, PMH of CVA, HTN
pt transferred to [address] [place] [address] admitted with intracranial hemorrhage

## 2020-03-03 LAB
ANION GAP SERPL CALC-SCNC: 11 MMO/L — SIGNIFICANT CHANGE UP (ref 7–14)
BUN SERPL-MCNC: 23 MG/DL — SIGNIFICANT CHANGE UP (ref 7–23)
CALCIUM SERPL-MCNC: 9.6 MG/DL — SIGNIFICANT CHANGE UP (ref 8.4–10.5)
CHLORIDE SERPL-SCNC: 100 MMOL/L — SIGNIFICANT CHANGE UP (ref 98–107)
CO2 SERPL-SCNC: 25 MMOL/L — SIGNIFICANT CHANGE UP (ref 22–31)
CREAT SERPL-MCNC: 1.16 MG/DL — SIGNIFICANT CHANGE UP (ref 0.5–1.3)
GLUCOSE SERPL-MCNC: 106 MG/DL — HIGH (ref 70–99)
HCT VFR BLD CALC: 32.6 % — LOW (ref 39–50)
HGB BLD-MCNC: 10.4 G/DL — LOW (ref 13–17)
MAGNESIUM SERPL-MCNC: 2 MG/DL — SIGNIFICANT CHANGE UP (ref 1.6–2.6)
MCHC RBC-ENTMCNC: 29.4 PG — SIGNIFICANT CHANGE UP (ref 27–34)
MCHC RBC-ENTMCNC: 31.9 % — LOW (ref 32–36)
MCV RBC AUTO: 92.1 FL — SIGNIFICANT CHANGE UP (ref 80–100)
NRBC # FLD: 0 K/UL — SIGNIFICANT CHANGE UP (ref 0–0)
PHOSPHATE SERPL-MCNC: 3.8 MG/DL — SIGNIFICANT CHANGE UP (ref 2.5–4.5)
PLATELET # BLD AUTO: 338 K/UL — SIGNIFICANT CHANGE UP (ref 150–400)
PMV BLD: 9.8 FL — SIGNIFICANT CHANGE UP (ref 7–13)
POTASSIUM SERPL-MCNC: 3.5 MMOL/L — SIGNIFICANT CHANGE UP (ref 3.5–5.3)
POTASSIUM SERPL-SCNC: 3.5 MMOL/L — SIGNIFICANT CHANGE UP (ref 3.5–5.3)
RBC # BLD: 3.54 M/UL — LOW (ref 4.2–5.8)
RBC # FLD: 13.2 % — SIGNIFICANT CHANGE UP (ref 10.3–14.5)
SODIUM SERPL-SCNC: 136 MMOL/L — SIGNIFICANT CHANGE UP (ref 135–145)
WBC # BLD: 7.16 K/UL — SIGNIFICANT CHANGE UP (ref 3.8–10.5)
WBC # FLD AUTO: 7.16 K/UL — SIGNIFICANT CHANGE UP (ref 3.8–10.5)

## 2020-03-03 PROCEDURE — 99232 SBSQ HOSP IP/OBS MODERATE 35: CPT

## 2020-03-03 PROCEDURE — 99233 SBSQ HOSP IP/OBS HIGH 50: CPT

## 2020-03-03 RX ADMIN — Medication 250 MILLIGRAM(S): at 21:49

## 2020-03-03 RX ADMIN — LEVETIRACETAM 1000 MILLIGRAM(S): 250 TABLET, FILM COATED ORAL at 17:30

## 2020-03-03 RX ADMIN — Medication 50 MILLIGRAM(S): at 13:06

## 2020-03-03 RX ADMIN — MORPHINE SULFATE 2 MILLIGRAM(S): 50 CAPSULE, EXTENDED RELEASE ORAL at 13:36

## 2020-03-03 RX ADMIN — MORPHINE SULFATE 2 MILLIGRAM(S): 50 CAPSULE, EXTENDED RELEASE ORAL at 13:21

## 2020-03-03 RX ADMIN — Medication 1 PATCH: at 06:15

## 2020-03-03 RX ADMIN — Medication 3 MILLIGRAM(S): at 21:55

## 2020-03-03 RX ADMIN — CHLORHEXIDINE GLUCONATE 15 MILLILITER(S): 213 SOLUTION TOPICAL at 05:26

## 2020-03-03 RX ADMIN — Medication 1 PATCH: at 17:30

## 2020-03-03 RX ADMIN — OLANZAPINE 5 MILLIGRAM(S): 15 TABLET, FILM COATED ORAL at 21:56

## 2020-03-03 RX ADMIN — AMLODIPINE BESYLATE 10 MILLIGRAM(S): 2.5 TABLET ORAL at 05:26

## 2020-03-03 RX ADMIN — Medication 250 MILLIGRAM(S): at 09:35

## 2020-03-03 RX ADMIN — Medication 100 MILLIGRAM(S): at 05:26

## 2020-03-03 RX ADMIN — FAMOTIDINE 20 MILLIGRAM(S): 10 INJECTION INTRAVENOUS at 17:30

## 2020-03-03 RX ADMIN — LEVETIRACETAM 1000 MILLIGRAM(S): 250 TABLET, FILM COATED ORAL at 05:26

## 2020-03-03 RX ADMIN — ATORVASTATIN CALCIUM 10 MILLIGRAM(S): 80 TABLET, FILM COATED ORAL at 21:56

## 2020-03-03 RX ADMIN — Medication 400 MILLIGRAM(S): at 13:06

## 2020-03-03 RX ADMIN — CHLORHEXIDINE GLUCONATE 15 MILLILITER(S): 213 SOLUTION TOPICAL at 17:30

## 2020-03-03 RX ADMIN — Medication 100 MILLIGRAM(S): at 13:06

## 2020-03-03 RX ADMIN — Medication 400 MILLIGRAM(S): at 05:26

## 2020-03-03 RX ADMIN — Medication 100 MILLIGRAM(S): at 21:56

## 2020-03-03 RX ADMIN — Medication 400 MILLIGRAM(S): at 21:56

## 2020-03-03 NOTE — CHART NOTE - NSCHARTNOTEFT_GEN_A_CORE
Briefly, 40 year old male with PMHx of prior R BG infarct (with R on CT head at [facility]), HTN who presents as a transfer from [facility] for L thalamic bleed (approx 1.5 cm on 2 pm 2/18 scan with vasogenic edema) with IVH, CTA H&N unremarkable. He has a persistent L sided deficit at baseline, and lives with father. On day of presentation father noted that pt was not talking and became obtunded 5 min before EMS arrived. Prior to arrival to [facility], he was witnessed to have 1 GTC, and was intubated after. He is not on antithrombotic, and home meds include lisinopril, labetalol and amlodipine. No recent illnesses, no trauma. He was transferred to [facility] (no bed availability at [facility]) for vEEG and further management. During hospital course, patient subsequently extubated and mental status improved. On Keppra for AED. Subsequent CT head demonstrating stable improving L basal ganglia IPH.   ICH score 3, MRS unknown.    < from: MR Head w/wo IV Cont (03.02.20 @ 09:43) >  Impression: Abnormal areas of hemorrhage as described above.   Encephalomalacia and gliosis as described above.  < end of copied text >    MRI reviewed.  vEEG reviewed -- no seizures.    Impression: AMS a/w GTC 2/2 L Basal ganglia IPH with IVH likely 2/2 hypertension possibly in setting of cocaine use; no underlying mass lesion visualized on MRI -- patient clinically improved since initial presentation    Recommendations:  [x] MRI brain w/ & w/o contrast: reviewed  [x] CTH: stable  [x] Continue Keppra as AED  [] No further inpatient Neurologic work-up at this time  [] Patient should follow up with outpatient Neurology for repeat MRI brain w/ & w/o contrast in 2-3 months, MRA Head w/o contrast, MRA Neck w/ contrast, and possible conventional angiogram   Address: [address] [place] [address]  Phone: [phone] Briefly, 40 year old male with PMHx of prior R BG infarct (with R on CT head at [facility]), HTN who presents as a transfer from [facility] for L thalamic bleed (approx 1.5 cm on 2 pm 2/18 scan with vasogenic edema) with IVH, CTA H&N unremarkable. He has a persistent L sided deficit at baseline, and lives with father. On day of presentation father noted that pt was not talking and became obtunded 5 min before EMS arrived. Prior to arrival to [facility], he was witnessed to have 1 GTC, and was intubated after. He is not on antithrombotics, and home meds include lisinopril, labetalol and amlodipine. No recent illnesses, no trauma. He was transferred to [facility] (no bed availability at [facility]) for vEEG and further management. During hospital course, patient subsequently extubated and mental status improved. On Keppra for AED. Subsequent CT head demonstrating stable improving L basal ganglia IPH.   ICH score 3, MRS unknown.    < from: MR Head w/wo IV Cont (03.02.20 @ 09:43) >  Impression: Abnormal areas of hemorrhage as described above.   Encephalomalacia and gliosis as described above.  < end of copied text >    MRI reviewed.  vEEG reviewed -- no seizures.    Impression: AMS a/w GTC 2/2 L Basal ganglia IPH with IVH likely 2/2 hypertension possibly in setting of cocaine use; no underlying mass lesion visualized on MRI -- patient clinically improved since initial presentation    Recommendations:  [x] MRI brain w/ & w/o contrast: reviewed  [x] CTH: stable  [x] Continue Keppra as AED  [] No further inpatient Neurologic work-up at this time  [] Patient should follow up with outpatient Neurology for repeat MRI brain w/ & w/o contrast in 2-3 months, MRA Head w/o contrast, MRA Neck w/ contrast, and possible conventional angiogram   Address: [address] [place] [address]  Phone: [phone]    Stroke attending. Agree with above

## 2020-03-03 NOTE — PROGRESS NOTE ADULT - SUBJECTIVE AND OBJECTIVE BOX
40y old  Male who presents with a chief complaint of hemorrhagic cva (01 Mar 2020 08:57)      Interval history:  Afebrile, doing well, some cough, tolerating po well.      Allergies:   No Known Allergies      Antimicrobials:  vancomycin  IVPB 750 milliGRAM(s) IV Intermittent every 12 hours      REVIEW OF SYSTEMS:  No chest pain   No SOB  No N/V/D, no abdominal pain  No dysuria  No rash.       Vital Signs Last 24 Hrs  T(C): 36.6 (03-03-20 @ 13:05), Max: 36.9 (03-02-20 @ 19:49)  T(F): 97.8 (03-03-20 @ 13:05), Max: 98.5 (03-02-20 @ 19:49)  HR: 74 (03-03-20 @ 13:05) (65 - 74)  BP: 128/86 (03-03-20 @ 13:05) (107/68 - 147/91)  BP(mean): --  RR: 17 (03-03-20 @ 13:05) (17 - 18)  SpO2: 100% (03-03-20 @ 13:05) (95% - 100%)      PHYSICAL EXAM:  Patient in no acute distress. Alert, awake.   Cardiovascular: S1S2 normal.  Lungs: + air entry B/L lung fields,   Gastrointestinal: soft, nontender, nondistended.  Extremities: no edema.  + arrow catheter in place  + condom catheter.                             10.4   7.16  )-----------( 338      ( 03 Mar 2020 06:45 )             32.6   03-03    136  |  100  |  23  ----------------------------<  106<H>  3.5   |  25  |  1.16    Ca    9.6      03 Mar 2020 06:45  Phos  3.8     03-03  Mg     2.0     03-03

## 2020-03-03 NOTE — PROGRESS NOTE ADULT - ASSESSMENT
41 y/o M PMHx DMII, reported polysubstance abuse ( alcohol and cocaine), previous ICH in september 2019 with residual left sided weakness was transferred from [facility] after episode of unresponsiveness leading to diagnosis of new hemorrhagic CVA, now with persistent fevers, leukocytosis with bandemia, and MRSA bacteremia.      sepsis, fever, leucocytosis, tachycardia, due to MRSA Bacteremia, possible source phlebitis.   resolved sepsis, no fever or leucocytosis,      Plan:   -Bcx 1 of 4 (aerobic) positive for MRSA.   -Unclear source, possible phlebitis rt arm.   -CT with no obvious spinal involvement.   - repeat TTE with no obvious vegetation  -c/w Vanco for MRSA pneumonia, trough elevated today at 29, but collected much earlier, decrease dose to 750 mg iv q12h.   -monitor vancomycin trough and creatinine to avoid nephrotoxicity and ensure efficacy, check trough prior to the morning dose tomorrow.   -repeat blood cx NTD.   -will need 4 weeks of therapy from the negative cx. until 3/24/20; on discharge CBC/BMP, vanco trough once a week while on OPAT.   -neuro following

## 2020-03-03 NOTE — PROGRESS NOTE ADULT - SUBJECTIVE AND OBJECTIVE BOX
Patient is a 40y old  Male who presents with a chief complaint of hemorrhagic cva (01 Mar 2020 08:57)      SUBJECTIVE / OVERNIGHT EVENTS:    No events overnight. This AM, patient without f/c/n/v/d/cp/sob.    MEDICATIONS  (STANDING):  amitriptyline 50 milliGRAM(s) Oral daily  amLODIPine   Tablet 10 milliGRAM(s) Oral daily  atorvastatin 10 milliGRAM(s) Oral at bedtime  chlorhexidine 0.12% Liquid 15 milliLiter(s) Oral Mucosa two times a day  cloNIDine Patch 0.1 mG/24Hr(s) 1 patch Transdermal every 7 days  famotidine   Suspension 20 milliGRAM(s) Oral daily  hydrALAZINE 100 milliGRAM(s) Oral three times a day  labetalol 400 milliGRAM(s) Oral every 8 hours  levETIRAcetam 1000 milliGRAM(s) Oral two times a day  melatonin 3 milliGRAM(s) Oral at bedtime  OLANZapine 5 milliGRAM(s) Oral at bedtime  vancomycin  IVPB 750 milliGRAM(s) IV Intermittent every 12 hours    MEDICATIONS  (PRN):  hydrALAZINE Injectable 10 milliGRAM(s) IV Push once PRN hypertension SBP>180  morphine  - Injectable 2 milliGRAM(s) IV Push every 6 hours PRN Severe Pain (7 - 10)      PHYSICAL EXAM:  T(C): 36.6 (03-03-20 @ 13:05), Max: 36.9 (03-02-20 @ 19:49)  HR: 74 (03-03-20 @ 13:05) (65 - 74)  BP: 128/86 (03-03-20 @ 13:05) (107/68 - 147/91)  RR: 17 (03-03-20 @ 13:05) (17 - 18)  SpO2: 100% (03-03-20 @ 13:05) (95% - 100%)  I&O's Summary      GENERAL: NAD, well-developed, lying in bed, pleasant  HEAD:  Atraumatic, Normocephalic, MMM  CHEST/LUNG: Clear to auscultation bilaterally; No wheeze  HEART: Regular rate and rhythm; No murmurs, rubs, or gallops  ABDOMEN: Soft, Nontender, Nondistended; Bowel sounds present  EXTREMITIES:  2+ Peripheral Pulses, No clubbing, cyanosis, or edema  PSYCH: AAOx3  NEUROLOGY: CN II-XII grossly intact, able to move R arm, minimal movement of R leg; no movement of L arm or L leg  lesions    LABS:  CAPILLARY BLOOD GLUCOSE                              10.4   7.16  )-----------( 338      ( 03 Mar 2020 06:45 )             32.6     03-03    136  |  100  |  23  ----------------------------<  106<H>  3.5   |  25  |  1.16    Ca    9.6      03 Mar 2020 06:45  Phos  3.8     03-03  Mg     2.0     03-03                  RADIOLOGY & ADDITIONAL TESTS:    Telemetry Personally Reviewed -     Imaging Personally Reviewed -     Imaging Reviewed -     Consultant(s) Notes Reviewed -   neuro, ID    Care Discussed with Consultants/Other Providers -

## 2020-03-04 LAB
ANION GAP SERPL CALC-SCNC: 14 MMO/L — SIGNIFICANT CHANGE UP (ref 7–14)
BUN SERPL-MCNC: 23 MG/DL — SIGNIFICANT CHANGE UP (ref 7–23)
CALCIUM SERPL-MCNC: 9.7 MG/DL — SIGNIFICANT CHANGE UP (ref 8.4–10.5)
CHLORIDE SERPL-SCNC: 101 MMOL/L — SIGNIFICANT CHANGE UP (ref 98–107)
CO2 SERPL-SCNC: 24 MMOL/L — SIGNIFICANT CHANGE UP (ref 22–31)
CREAT SERPL-MCNC: 1.23 MG/DL — SIGNIFICANT CHANGE UP (ref 0.5–1.3)
GLUCOSE SERPL-MCNC: 92 MG/DL — SIGNIFICANT CHANGE UP (ref 70–99)
HCT VFR BLD CALC: 32.4 % — LOW (ref 39–50)
HGB BLD-MCNC: 10.3 G/DL — LOW (ref 13–17)
MCHC RBC-ENTMCNC: 29.9 PG — SIGNIFICANT CHANGE UP (ref 27–34)
MCHC RBC-ENTMCNC: 31.8 % — LOW (ref 32–36)
MCV RBC AUTO: 93.9 FL — SIGNIFICANT CHANGE UP (ref 80–100)
NRBC # FLD: 0 K/UL — SIGNIFICANT CHANGE UP (ref 0–0)
PLATELET # BLD AUTO: 330 K/UL — SIGNIFICANT CHANGE UP (ref 150–400)
PMV BLD: 9.9 FL — SIGNIFICANT CHANGE UP (ref 7–13)
POTASSIUM SERPL-MCNC: 3.7 MMOL/L — SIGNIFICANT CHANGE UP (ref 3.5–5.3)
POTASSIUM SERPL-SCNC: 3.7 MMOL/L — SIGNIFICANT CHANGE UP (ref 3.5–5.3)
RBC # BLD: 3.45 M/UL — LOW (ref 4.2–5.8)
RBC # FLD: 13.4 % — SIGNIFICANT CHANGE UP (ref 10.3–14.5)
SODIUM SERPL-SCNC: 139 MMOL/L — SIGNIFICANT CHANGE UP (ref 135–145)
VANCOMYCIN TROUGH SERPL-MCNC: 17.8 UG/ML — SIGNIFICANT CHANGE UP (ref 10–20)
WBC # BLD: 6.24 K/UL — SIGNIFICANT CHANGE UP (ref 3.8–10.5)
WBC # FLD AUTO: 6.24 K/UL — SIGNIFICANT CHANGE UP (ref 3.8–10.5)

## 2020-03-04 PROCEDURE — 99233 SBSQ HOSP IP/OBS HIGH 50: CPT

## 2020-03-04 RX ADMIN — Medication 50 MILLIGRAM(S): at 13:13

## 2020-03-04 RX ADMIN — Medication 400 MILLIGRAM(S): at 22:05

## 2020-03-04 RX ADMIN — Medication 400 MILLIGRAM(S): at 06:29

## 2020-03-04 RX ADMIN — FAMOTIDINE 20 MILLIGRAM(S): 10 INJECTION INTRAVENOUS at 13:13

## 2020-03-04 RX ADMIN — LEVETIRACETAM 1000 MILLIGRAM(S): 250 TABLET, FILM COATED ORAL at 17:32

## 2020-03-04 RX ADMIN — Medication 1 PATCH: at 12:00

## 2020-03-04 RX ADMIN — Medication 250 MILLIGRAM(S): at 11:40

## 2020-03-04 RX ADMIN — Medication 400 MILLIGRAM(S): at 13:13

## 2020-03-04 RX ADMIN — Medication 100 MILLIGRAM(S): at 22:05

## 2020-03-04 RX ADMIN — Medication 250 MILLIGRAM(S): at 22:03

## 2020-03-04 RX ADMIN — CHLORHEXIDINE GLUCONATE 15 MILLILITER(S): 213 SOLUTION TOPICAL at 17:32

## 2020-03-04 RX ADMIN — Medication 1 PATCH: at 18:35

## 2020-03-04 RX ADMIN — OLANZAPINE 5 MILLIGRAM(S): 15 TABLET, FILM COATED ORAL at 22:05

## 2020-03-04 RX ADMIN — MORPHINE SULFATE 2 MILLIGRAM(S): 50 CAPSULE, EXTENDED RELEASE ORAL at 11:40

## 2020-03-04 RX ADMIN — Medication 100 MILLIGRAM(S): at 06:29

## 2020-03-04 RX ADMIN — LEVETIRACETAM 1000 MILLIGRAM(S): 250 TABLET, FILM COATED ORAL at 06:29

## 2020-03-04 RX ADMIN — ATORVASTATIN CALCIUM 10 MILLIGRAM(S): 80 TABLET, FILM COATED ORAL at 22:05

## 2020-03-04 RX ADMIN — Medication 100 MILLIGRAM(S): at 13:13

## 2020-03-04 RX ADMIN — Medication 3 MILLIGRAM(S): at 22:06

## 2020-03-04 RX ADMIN — MORPHINE SULFATE 2 MILLIGRAM(S): 50 CAPSULE, EXTENDED RELEASE ORAL at 11:55

## 2020-03-04 RX ADMIN — Medication 1 PATCH: at 13:13

## 2020-03-04 RX ADMIN — Medication 1 PATCH: at 08:19

## 2020-03-04 RX ADMIN — CHLORHEXIDINE GLUCONATE 15 MILLILITER(S): 213 SOLUTION TOPICAL at 06:29

## 2020-03-04 RX ADMIN — AMLODIPINE BESYLATE 10 MILLIGRAM(S): 2.5 TABLET ORAL at 06:29

## 2020-03-04 NOTE — PROGRESS NOTE ADULT - SUBJECTIVE AND OBJECTIVE BOX
Patient is a 40y old  Male who presents with a chief complaint of hemorrhagic cva (01 Mar 2020 08:57)      SUBJECTIVE / OVERNIGHT EVENTS:    No events overnight. This AM, patient without f/c/n/v/d/cp/sob.    MEDICATIONS  (STANDING):  amitriptyline 50 milliGRAM(s) Oral daily  amLODIPine   Tablet 10 milliGRAM(s) Oral daily  atorvastatin 10 milliGRAM(s) Oral at bedtime  chlorhexidine 0.12% Liquid 15 milliLiter(s) Oral Mucosa two times a day  cloNIDine Patch 0.1 mG/24Hr(s) 1 patch Transdermal every 7 days  famotidine   Suspension 20 milliGRAM(s) Oral daily  hydrALAZINE 100 milliGRAM(s) Oral three times a day  labetalol 400 milliGRAM(s) Oral every 8 hours  levETIRAcetam 1000 milliGRAM(s) Oral two times a day  melatonin 3 milliGRAM(s) Oral at bedtime  OLANZapine 5 milliGRAM(s) Oral at bedtime  vancomycin  IVPB 750 milliGRAM(s) IV Intermittent every 12 hours    MEDICATIONS  (PRN):  hydrALAZINE Injectable 10 milliGRAM(s) IV Push once PRN hypertension SBP>180  morphine  - Injectable 2 milliGRAM(s) IV Push every 6 hours PRN Severe Pain (7 - 10)      PHYSICAL EXAM:  T(C): 36.4 (03-04-20 @ 13:11), Max: 36.8 (03-03-20 @ 20:30)  HR: 77 (03-04-20 @ 13:11) (65 - 91)  BP: 123/71 (03-04-20 @ 13:11) (115/73 - 127/89)  RR: 17 (03-04-20 @ 13:11) (16 - 17)  SpO2: 97% (03-04-20 @ 13:11) (94% - 97%)  I&O's Summary    GENERAL: NAD, well-developed, lying in bed, pleasant  HEAD:  Atraumatic, Normocephalic, MMM  CHEST/LUNG: Clear to auscultation bilaterally; No wheeze  HEART: Regular rate and rhythm; No murmurs, rubs, or gallops  ABDOMEN: Soft, Nontender, Nondistended; Bowel sounds present  EXTREMITIES:  2+ Peripheral Pulses, No clubbing, cyanosis, or edema  PSYCH: AAOx3  NEUROLOGY: CN II-XII grossly intact, able to move R arm, minimal movement of R leg; no movement of L arm or L leg  lesions    LABS:  CAPILLARY BLOOD GLUCOSE                              10.3   6.24  )-----------( 330      ( 04 Mar 2020 05:14 )             32.4     03-04    139  |  101  |  23  ----------------------------<  92  3.7   |  24  |  1.23    Ca    9.7      04 Mar 2020 05:14  Phos  3.8     03-03  Mg     2.0     03-03                  RADIOLOGY & ADDITIONAL TESTS:    Telemetry Personally Reviewed -     Imaging Personally Reviewed -     Imaging Reviewed -     Consultant(s) Notes Reviewed -       Care Discussed with Consultants/Other Providers -

## 2020-03-04 NOTE — PROGRESS NOTE ADULT - ASSESSMENT
40M w/HTN and ICH in September 2019 (with residual LUE and LLE weakness) transferred from  where he presented with AMS and unresponsiveness, intubated for airway protection, fd to have acute ICH. Repeat CTH stable. Required nicardipine gtt in ICU. MRSA bacteremia on Vanc. ID on board. TTE negative.

## 2020-03-04 NOTE — PROGRESS NOTE ADULT - PROBLEM SELECTOR PLAN 1
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 Abnormal susceptibility seen involving the right lower kaylyn which could be compatible areas of old hemorrhage mineralization or small cavernous angiomas.  - TTE showed increased LV wall thickness, bubble study was unsuccessful due to poor visualization.   - LE duplex negative for DVTs  - f/u hypercoagulable work up - results so far show decreased protein S activity, low factor II assay, high factor VIII assay

## 2020-03-04 NOTE — PROGRESS NOTE ADULT - PROBLEM SELECTOR PLAN 3
[address] [place]  
[phone] Patient: Manjeet Badillo 
MRN: [id number] :1989 About your hospitalization You were admitted on:  N/A You last received care in the:  [facility] You were discharged on:  2018 Why you were hospitalized Your primary diagnosis was:  Not on File Your diagnoses also included:  Abdominal Pain During Pregnancy, Second Trimester Follow-up Information Follow up With Details Comments Contact Info NEELAM Brewer [address] [facility] [address] Discharge Orders None A check joaquin indicates which time of day the medication should be taken. My Medications ASK your doctor about these medications Instructions Each Dose to Equal  
 Morning Noon Evening Bedtime  
 pnv w/o calcium-iron fum-fa 27-1 mg Tab Your last dose was: Your next dose is: Take  by mouth. Discharge Instructions Follow up as scheduled in office. Pregnancy Precautions: Care Instructions Your Care Instructions There is no sure way to prevent labor before your due date ( labor) or to prevent most other pregnancy problems. But there are things you can do to increase your chances of a healthy pregnancy. Go to your appointments, follow your doctor's advice, and take good care of yourself. Eat well, and exercise (if your doctor agrees). And make sure to drink plenty of water. Follow-up care is a key part of your treatment and safety. Be sure to make and go to all appointments, and call your doctor if you are having problems. It's also a good idea to know your test results and keep a list of the medicines you take. How can you care for yourself at home? · Make sure you go to your prenatal appointments.  At each visit, your doctor will check your blood pressure. Your doctor will also check to see if you have protein in your urine. High blood pressure and protein in urine are signs of preeclampsia. This condition can be dangerous for you and your baby. · Drink plenty of fluids, enough so that your urine is light yellow or clear like water. Dehydration can cause contractions. If you have kidney, heart, or liver disease and have to limit fluids, talk with your doctor before you increase the amount of fluids you drink. · Tell your doctor right away if you notice any symptoms of an infection, such as: ¨ Burning when you urinate. ¨ A foul-smelling discharge from your vagina. ¨ Vaginal itching. ¨ Unexplained fever. ¨ Unusual pain or soreness in your uterus or lower belly. · Eat a balanced diet. Include plenty of foods that are high in calcium and iron. ¨ Foods high in calcium include milk, cheese, yogurt, almonds, and broccoli. ¨ Foods high in iron include red meat, shellfish, poultry, eggs, beans, raisins, whole-grain bread, and leafy green vegetables. · Do not smoke. If you need help quitting, talk to your doctor about stop-smoking programs and medicines. These can increase your chances of quitting for good. · Do not drink alcohol or use illegal drugs. · Follow your doctor's directions about activity. Your doctor will let you know how much, if any, exercise you can do. · Ask your doctor if you can have sex. If you are at risk for early labor, your doctor may ask you to not have sex. · Take care to prevent falls. During pregnancy, your joints are loose, and your balance is off. Sports such as bicycling, skiing, or in-line skating can increase your risk of falling. And don't ride horses or motorcycles, dive, water ski, scuba dive, or parachute jump while you are pregnant. · Avoid getting very hot. Do not use saunas or hot tubs. Avoid staying out in the sun in hot weather for long periods.  Take acetaminophen (Tylenol) to lower a high fever. · Do not take any over-the-counter or herbal medicines or supplements without talking to your doctor or pharmacist first. 
When should you call for help? Call 911 anytime you think you may need emergency care. For example, call if: 
? · You passed out (lost consciousness). ? · You have severe vaginal bleeding. ? · You have severe pain in your belly or pelvis. ? · You have had fluid gushing or leaking from your vagina and you know or think the umbilical cord is bulging into your vagina. If this happens, immediately get down on your knees so your rear end (buttocks) is higher than your head. This will decrease the pressure on the cord until help arrives. ?Call your doctor now or seek immediate medical care if: 
? · You have signs of preeclampsia, such as: 
¨ Sudden swelling of your face, hands, or feet. ¨ New vision problems (such as dimness or blurring). ¨ A severe headache. ? · You have any vaginal bleeding. ? · You have belly pain or cramping. ? · You have a fever. ? · You have had regular contractions (with or without pain) for an hour. This means that you have 8 or more within 1 hour or 4 or more in 20 minutes after you change your position and drink fluids. ? · You have a sudden release of fluid from your vagina. ? · You have low back pain or pelvic pressure that does not go away. ? · You notice that your baby has stopped moving or is moving much less than normal. ? Watch closely for changes in your health, and be sure to contact your doctor if you have any problems. Where can you learn more? Go to http://rocio-tangela.info/. Enter [id number] in the search box to learn more about \"Pregnancy Precautions: Care Instructions. \" Current as of: March 16, 2017 Content Version: 11.4 © 3731-0263 [employer].  Care instructions adapted under license by [employer] (which disclaims liability or warranty for this information). If you have questions about a medical condition or this instruction, always ask your healthcare professional. [address] any warranty or liability for your use of this information. Introducing [facility]! Dear John Torrez: Thank you for requesting a HItviews account. Our records indicate that you already have an active HItviews account. You can access your account anytime at [email or web address] Did you know that you can access your hospital and ER discharge instructions at any time in HItviews? You can also review all of your test results from your hospital stay or ER visit. Additional Information If you have questions, please visit the Frequently Asked Questions section of the HItviews website at [email or web address] Remember, HItviews is NOT to be used for urgent needs. For medical emergencies, dial 911. Now available from your iPhone and Android! Introducing Ronald Zaragoza As a [facility] patient, I wanted to make you aware of our electronic visit tool called Ronald Zaragoza. [facility] 24/7 allows you to connect within minutes with a medical provider 24 hours a day, seven days a week via a mobile device or tablet or logging into a secure website from your computer. You can access Ronald Zaragoza from anywhere in the [place]. A virtual visit might be right for you when you have a simple condition and feel like you just dont want to get out of bed, or cant get away from work for an appointment, when your regular [facility] provider is not available (evenings, weekends or holidays), or when youre out of town and need minor care. Electronic visits cost only $49 and if the [facility] 24/7 provider determines a prescription is needed to treat your condition, one can be electronically transmitted to a nearby pharmacy*. Please take a moment to enroll today if you have not already done so. The enrollment process is free and takes just a few minutes. To enroll, please download the [facility] 24/7 joe to your tablet or phone, or visit [email or web address] to enroll on your computer. And, as an [address] [place] [address] patient with a [employer] account, the results of your visits will be scanned into your electronic medical record and your primary care provider will be able to view the scanned results. We urge you to continue to see your regular [facility] provider for your ongoing medical care. And while your primary care provider may not be the one available when you seek a [employer] virtual visit, the peace of mind you get from getting a real diagnosis real time can be priceless. For more information on [employer], view our Frequently Asked Questions (FAQs) at [email or web address]. Sincerely, 
 
Enrique Cleveland MD 
Chief Medical Officer [address] [place] [address] *:  certain medications cannot be prescribed via [employer] Providers Seen During Your Hospitalization Provider Specialty Primary office phone Angelo Kinsey MD Obstetrics & Gynecology [phone] Your Primary Care Physician (PCP) Primary Care Physician Office Phone Office Fax [address] [facility] 18 You are allergic to the following Allergen Reactions Bactrim (Sulfamethoprim Ds) Other (comments) States shut down liver Diflucan (Fluconazole) Swelling Other Medication Other (comments) Valtrim-caused liver dysfunction Sulfa (Sulfonamide Antibiotics) Other (comments) \"it makes my blood count low and it's bad for my liver. \"  
    
 Sulfamethoxazole-Trimethoprim Other (comments) Recent Documentation Height Weight BMI OB Status Smoking Status 1.6 m 73 kg 28.52 kg/m2 Pregnant Former Smoker Emergency Contacts Name Discharge Info Relation Home Work Mobile Courtney Covarrubias  Mother [14] [phone] Patient Belongings The following personal items are in your possession at time of discharge: 
                             
 
  
  
 Please provide this summary of care documentation to your next provider. Signatures-by signing, you are acknowledging that this After Visit Summary has been reviewed with you and you have received a copy. Patient Signature:  ____________________________________________________________ Date:  ____________________________________________________________  
  
[facility] Provider Signature:  ____________________________________________________________ Date:  ____________________________________________________________ On vancomycin  F/U ID  CT lumbar spine/Thoracic done results above.    TTE without vegetation  Will need PICC line for a total of 4 weeks of antibiotics

## 2020-03-05 LAB
ANION GAP SERPL CALC-SCNC: 12 MMO/L — SIGNIFICANT CHANGE UP (ref 7–14)
BUN SERPL-MCNC: 22 MG/DL — SIGNIFICANT CHANGE UP (ref 7–23)
CALCIUM SERPL-MCNC: 9.7 MG/DL — SIGNIFICANT CHANGE UP (ref 8.4–10.5)
CHLORIDE SERPL-SCNC: 101 MMOL/L — SIGNIFICANT CHANGE UP (ref 98–107)
CO2 SERPL-SCNC: 25 MMOL/L — SIGNIFICANT CHANGE UP (ref 22–31)
CREAT SERPL-MCNC: 1.07 MG/DL — SIGNIFICANT CHANGE UP (ref 0.5–1.3)
GLUCOSE SERPL-MCNC: 98 MG/DL — SIGNIFICANT CHANGE UP (ref 70–99)
HCT VFR BLD CALC: 30.7 % — LOW (ref 39–50)
HGB BLD-MCNC: 10.2 G/DL — LOW (ref 13–17)
MAGNESIUM SERPL-MCNC: 1.9 MG/DL — SIGNIFICANT CHANGE UP (ref 1.6–2.6)
MCHC RBC-ENTMCNC: 29.9 PG — SIGNIFICANT CHANGE UP (ref 27–34)
MCHC RBC-ENTMCNC: 33.2 % — SIGNIFICANT CHANGE UP (ref 32–36)
MCV RBC AUTO: 90 FL — SIGNIFICANT CHANGE UP (ref 80–100)
NRBC # FLD: 0 K/UL — SIGNIFICANT CHANGE UP (ref 0–0)
PHOSPHATE SERPL-MCNC: 4 MG/DL — SIGNIFICANT CHANGE UP (ref 2.5–4.5)
PLATELET # BLD AUTO: 302 K/UL — SIGNIFICANT CHANGE UP (ref 150–400)
PMV BLD: 9.4 FL — SIGNIFICANT CHANGE UP (ref 7–13)
POTASSIUM SERPL-MCNC: 3.6 MMOL/L — SIGNIFICANT CHANGE UP (ref 3.5–5.3)
POTASSIUM SERPL-SCNC: 3.6 MMOL/L — SIGNIFICANT CHANGE UP (ref 3.5–5.3)
RBC # BLD: 3.41 M/UL — LOW (ref 4.2–5.8)
RBC # FLD: 13.4 % — SIGNIFICANT CHANGE UP (ref 10.3–14.5)
SODIUM SERPL-SCNC: 138 MMOL/L — SIGNIFICANT CHANGE UP (ref 135–145)
VANCOMYCIN TROUGH SERPL-MCNC: 15.2 UG/ML — SIGNIFICANT CHANGE UP (ref 10–20)
WBC # BLD: 5.39 K/UL — SIGNIFICANT CHANGE UP (ref 3.8–10.5)
WBC # FLD AUTO: 5.39 K/UL — SIGNIFICANT CHANGE UP (ref 3.8–10.5)

## 2020-03-05 PROCEDURE — 99232 SBSQ HOSP IP/OBS MODERATE 35: CPT

## 2020-03-05 PROCEDURE — 99233 SBSQ HOSP IP/OBS HIGH 50: CPT

## 2020-03-05 RX ADMIN — Medication 100 MILLIGRAM(S): at 06:08

## 2020-03-05 RX ADMIN — Medication 250 MILLIGRAM(S): at 10:43

## 2020-03-05 RX ADMIN — MORPHINE SULFATE 2 MILLIGRAM(S): 50 CAPSULE, EXTENDED RELEASE ORAL at 06:00

## 2020-03-05 RX ADMIN — Medication 1 PATCH: at 08:00

## 2020-03-05 RX ADMIN — Medication 250 MILLIGRAM(S): at 22:03

## 2020-03-05 RX ADMIN — Medication 50 MILLIGRAM(S): at 14:21

## 2020-03-05 RX ADMIN — ATORVASTATIN CALCIUM 10 MILLIGRAM(S): 80 TABLET, FILM COATED ORAL at 22:03

## 2020-03-05 RX ADMIN — OLANZAPINE 5 MILLIGRAM(S): 15 TABLET, FILM COATED ORAL at 22:04

## 2020-03-05 RX ADMIN — CHLORHEXIDINE GLUCONATE 15 MILLILITER(S): 213 SOLUTION TOPICAL at 06:08

## 2020-03-05 RX ADMIN — LEVETIRACETAM 1000 MILLIGRAM(S): 250 TABLET, FILM COATED ORAL at 06:07

## 2020-03-05 RX ADMIN — AMLODIPINE BESYLATE 10 MILLIGRAM(S): 2.5 TABLET ORAL at 06:08

## 2020-03-05 RX ADMIN — Medication 400 MILLIGRAM(S): at 22:03

## 2020-03-05 RX ADMIN — MORPHINE SULFATE 2 MILLIGRAM(S): 50 CAPSULE, EXTENDED RELEASE ORAL at 05:49

## 2020-03-05 RX ADMIN — Medication 1 PATCH: at 19:25

## 2020-03-05 RX ADMIN — Medication 100 MILLIGRAM(S): at 22:03

## 2020-03-05 RX ADMIN — Medication 100 MILLIGRAM(S): at 14:21

## 2020-03-05 RX ADMIN — CHLORHEXIDINE GLUCONATE 15 MILLILITER(S): 213 SOLUTION TOPICAL at 17:28

## 2020-03-05 RX ADMIN — FAMOTIDINE 20 MILLIGRAM(S): 10 INJECTION INTRAVENOUS at 14:23

## 2020-03-05 RX ADMIN — Medication 3 MILLIGRAM(S): at 22:03

## 2020-03-05 RX ADMIN — Medication 400 MILLIGRAM(S): at 06:08

## 2020-03-05 RX ADMIN — Medication 400 MILLIGRAM(S): at 14:21

## 2020-03-05 RX ADMIN — LEVETIRACETAM 1000 MILLIGRAM(S): 250 TABLET, FILM COATED ORAL at 17:28

## 2020-03-05 NOTE — PROGRESS NOTE ADULT - PROBLEM SELECTOR PLAN 3
On vancomycin  F/U ID  CT lumbar spine/Thoracic done results above.    TTE without vegetation  Will need PICC line for a total of 4 weeks of antibiotics thru 3/24

## 2020-03-05 NOTE — PROGRESS NOTE ADULT - ASSESSMENT
39 y/o M PMHx DMII, reported polysubstance abuse ( alcohol and cocaine), previous ICH in september 2019 with residual left sided weakness was transferred from [facility] after episode of unresponsiveness leading to diagnosis of new hemorrhagic CVA, now with persistent fevers, leukocytosis with bandemia, and MRSA bacteremia.      sepsis, fever, leucocytosis, tachycardia, due to MRSA Bacteremia, possible source phlebitis.   resolved sepsis, no fever or leucocytosis,      Plan:   -Bcx 1 of 4 (aerobic) positive for MRSA.   -Unclear source, possible phlebitis rt arm.   -CT with no obvious spinal involvement.   - repeat TTE with no obvious vegetation  -c/w Vanco for MRSA pneumonia, trough therapeutic at 17, c/w 750 mg iv q12h.   -monitor vancomycin trough and creatinine to avoid nephrotoxicity and ensure efficacy,   -repeat blood cx NTD.   -will need 4 weeks of therapy from the negative cx. until 3/24/20; on discharge CBC/BMP, vanco trough once a week while on OPAT.   -please call neuro for follow up given crawling sensation

## 2020-03-05 NOTE — PROGRESS NOTE ADULT - SUBJECTIVE AND OBJECTIVE BOX
40y old  Male who presents with a chief complaint of hemorrhagic cva (01 Mar 2020 08:57)      Interval history:  Afebrile, could not sleep last night as he felt crawling sensation rt leg, no cough.       Allergies:   No Known Allergies      Antimicrobials:  vancomycin  IVPB 750 milliGRAM(s) IV Intermittent every 12 hours      REVIEW OF SYSTEMS:  No chest pain   No SOB  No N/V/D, no abdominal pain  No dysuria   No rash.       Vital Signs Last 24 Hrs  T(C): 36.5 (03-05-20 @ 14:19), Max: 37.3 (03-04-20 @ 20:17)  T(F): 97.7 (03-05-20 @ 14:19), Max: 99.1 (03-04-20 @ 20:17)  HR: 63 (03-05-20 @ 14:19) (63 - 82)  BP: 131/79 (03-05-20 @ 14:19) (126/81 - 139/92)  BP(mean): --  RR: 15 (03-05-20 @ 14:19) (15 - 17)  SpO2: 98% (03-05-20 @ 14:19) (95% - 99%)      PHYSICAL EXAM:  Patient in no acute distress. Alert, awake.   Cardiovascular: S1S2 normal.  Lungs: + air entry B/L lung fields,   Gastrointestinal: soft, nontender, nondistended.  Extremities: no edema.  + arrow catheter in place  + condom catheter.                             10.2   5.39  )-----------( 302      ( 05 Mar 2020 05:27 )             30.7   03-05    138  |  101  |  22  ----------------------------<  98  3.6   |  25  |  1.07    Ca    9.7      05 Mar 2020 05:27  Phos  4.0     03-05  Mg     1.9     03-05

## 2020-03-05 NOTE — PROGRESS NOTE ADULT - SUBJECTIVE AND OBJECTIVE BOX
Patient is a 40y old  Male who presents with a chief complaint of hemorrhagic cva (01 Mar 2020 08:57)    SUBJECTIVE / OVERNIGHT EVENTS:    No events overnight. This AM, patient without f/c/n/v/d/cp/sob.    MEDICATIONS  (STANDING):  amitriptyline 50 milliGRAM(s) Oral daily  amLODIPine   Tablet 10 milliGRAM(s) Oral daily  atorvastatin 10 milliGRAM(s) Oral at bedtime  chlorhexidine 0.12% Liquid 15 milliLiter(s) Oral Mucosa two times a day  cloNIDine Patch 0.1 mG/24Hr(s) 1 patch Transdermal every 7 days  famotidine   Suspension 20 milliGRAM(s) Oral daily  hydrALAZINE 100 milliGRAM(s) Oral three times a day  labetalol 400 milliGRAM(s) Oral every 8 hours  levETIRAcetam 1000 milliGRAM(s) Oral two times a day  melatonin 3 milliGRAM(s) Oral at bedtime  OLANZapine 5 milliGRAM(s) Oral at bedtime  vancomycin  IVPB 750 milliGRAM(s) IV Intermittent every 12 hours    MEDICATIONS  (PRN):  hydrALAZINE Injectable 10 milliGRAM(s) IV Push once PRN hypertension SBP>180      PHYSICAL EXAM:  T(C): 36.5 (03-05-20 @ 14:19), Max: 37.3 (03-04-20 @ 20:17)  HR: 63 (03-05-20 @ 14:19) (63 - 82)  BP: 131/79 (03-05-20 @ 14:19) (126/81 - 139/92)  RR: 15 (03-05-20 @ 14:19) (15 - 17)  SpO2: 98% (03-05-20 @ 14:19) (95% - 99%)  I&O's Summary    GENERAL: NAD, well-developed, lying in bed, pleasant  HEAD:  Atraumatic, Normocephalic, MMM  CHEST/LUNG: Clear to auscultation bilaterally; No wheeze  HEART: Regular rate and rhythm; No murmurs, rubs, or gallops  ABDOMEN: Soft, Nontender, Nondistended; Bowel sounds present  EXTREMITIES:  2+ Peripheral Pulses, No clubbing, cyanosis, or edema  PSYCH: AAOx3  NEUROLOGY: CN II-XII grossly intact, able to move R arm, minimal movement of R leg; no movement of L arm or L leg  lesions    LABS:  CAPILLARY BLOOD GLUCOSE                              10.2   5.39  )-----------( 302      ( 05 Mar 2020 05:27 )             30.7     03-05    138  |  101  |  22  ----------------------------<  98  3.6   |  25  |  1.07    Ca    9.7      05 Mar 2020 05:27  Phos  4.0     03-05  Mg     1.9     03-05                  RADIOLOGY & ADDITIONAL TESTS:    Telemetry Personally Reviewed -     Imaging Personally Reviewed -     Imaging Reviewed -     Consultant(s) Notes Reviewed -       Care Discussed with Consultants/Other Providers -

## 2020-03-05 NOTE — PROGRESS NOTE ADULT - ATTENDING COMMENTS
Akilah Lamas  Pager: [phone]. If no response or past 5 pm call [phone].     Will sign off, please call with questions.

## 2020-03-06 LAB
ANION GAP SERPL CALC-SCNC: 14 MMO/L — SIGNIFICANT CHANGE UP (ref 7–14)
BUN SERPL-MCNC: 22 MG/DL — SIGNIFICANT CHANGE UP (ref 7–23)
CALCIUM SERPL-MCNC: 9.5 MG/DL — SIGNIFICANT CHANGE UP (ref 8.4–10.5)
CHLORIDE SERPL-SCNC: 102 MMOL/L — SIGNIFICANT CHANGE UP (ref 98–107)
CO2 SERPL-SCNC: 23 MMOL/L — SIGNIFICANT CHANGE UP (ref 22–31)
CREAT SERPL-MCNC: 1.05 MG/DL — SIGNIFICANT CHANGE UP (ref 0.5–1.3)
GLUCOSE SERPL-MCNC: 90 MG/DL — SIGNIFICANT CHANGE UP (ref 70–99)
HCT VFR BLD CALC: 30.4 % — LOW (ref 39–50)
HGB BLD-MCNC: 10.1 G/DL — LOW (ref 13–17)
MAGNESIUM SERPL-MCNC: 1.9 MG/DL — SIGNIFICANT CHANGE UP (ref 1.6–2.6)
MCHC RBC-ENTMCNC: 30.1 PG — SIGNIFICANT CHANGE UP (ref 27–34)
MCHC RBC-ENTMCNC: 33.2 % — SIGNIFICANT CHANGE UP (ref 32–36)
MCV RBC AUTO: 90.5 FL — SIGNIFICANT CHANGE UP (ref 80–100)
NRBC # FLD: 0 K/UL — SIGNIFICANT CHANGE UP (ref 0–0)
PHOSPHATE SERPL-MCNC: 3.8 MG/DL — SIGNIFICANT CHANGE UP (ref 2.5–4.5)
PLATELET # BLD AUTO: 296 K/UL — SIGNIFICANT CHANGE UP (ref 150–400)
PMV BLD: 9.9 FL — SIGNIFICANT CHANGE UP (ref 7–13)
POTASSIUM SERPL-MCNC: 3.8 MMOL/L — SIGNIFICANT CHANGE UP (ref 3.5–5.3)
POTASSIUM SERPL-SCNC: 3.8 MMOL/L — SIGNIFICANT CHANGE UP (ref 3.5–5.3)
RBC # BLD: 3.36 M/UL — LOW (ref 4.2–5.8)
RBC # FLD: 13.2 % — SIGNIFICANT CHANGE UP (ref 10.3–14.5)
SODIUM SERPL-SCNC: 139 MMOL/L — SIGNIFICANT CHANGE UP (ref 135–145)
VANCOMYCIN TROUGH SERPL-MCNC: 18.9 UG/ML — SIGNIFICANT CHANGE UP (ref 10–20)
WBC # BLD: 4.88 K/UL — SIGNIFICANT CHANGE UP (ref 3.8–10.5)
WBC # FLD AUTO: 4.88 K/UL — SIGNIFICANT CHANGE UP (ref 3.8–10.5)

## 2020-03-06 PROCEDURE — 99233 SBSQ HOSP IP/OBS HIGH 50: CPT

## 2020-03-06 RX ADMIN — Medication 50 MILLIGRAM(S): at 13:17

## 2020-03-06 RX ADMIN — Medication 100 MILLIGRAM(S): at 06:28

## 2020-03-06 RX ADMIN — Medication 100 MILLIGRAM(S): at 13:17

## 2020-03-06 RX ADMIN — ATORVASTATIN CALCIUM 10 MILLIGRAM(S): 80 TABLET, FILM COATED ORAL at 22:54

## 2020-03-06 RX ADMIN — OLANZAPINE 5 MILLIGRAM(S): 15 TABLET, FILM COATED ORAL at 22:55

## 2020-03-06 RX ADMIN — Medication 1 PATCH: at 19:46

## 2020-03-06 RX ADMIN — FAMOTIDINE 20 MILLIGRAM(S): 10 INJECTION INTRAVENOUS at 17:43

## 2020-03-06 RX ADMIN — Medication 250 MILLIGRAM(S): at 22:53

## 2020-03-06 RX ADMIN — Medication 100 MILLIGRAM(S): at 22:54

## 2020-03-06 RX ADMIN — AMLODIPINE BESYLATE 10 MILLIGRAM(S): 2.5 TABLET ORAL at 06:28

## 2020-03-06 RX ADMIN — CHLORHEXIDINE GLUCONATE 15 MILLILITER(S): 213 SOLUTION TOPICAL at 06:28

## 2020-03-06 RX ADMIN — CHLORHEXIDINE GLUCONATE 15 MILLILITER(S): 213 SOLUTION TOPICAL at 17:43

## 2020-03-06 RX ADMIN — Medication 1 PATCH: at 07:20

## 2020-03-06 RX ADMIN — Medication 400 MILLIGRAM(S): at 06:28

## 2020-03-06 RX ADMIN — Medication 400 MILLIGRAM(S): at 22:54

## 2020-03-06 RX ADMIN — Medication 3 MILLIGRAM(S): at 22:54

## 2020-03-06 RX ADMIN — Medication 250 MILLIGRAM(S): at 10:20

## 2020-03-06 RX ADMIN — LEVETIRACETAM 1000 MILLIGRAM(S): 250 TABLET, FILM COATED ORAL at 06:28

## 2020-03-06 RX ADMIN — LEVETIRACETAM 1000 MILLIGRAM(S): 250 TABLET, FILM COATED ORAL at 17:43

## 2020-03-06 RX ADMIN — Medication 400 MILLIGRAM(S): at 13:17

## 2020-03-06 NOTE — PROGRESS NOTE ADULT - PROBLEM SELECTOR PLAN 1
Hx of R basal ganglia hemorrhagic stroke, L thalamus ischemic stroke and R parietal density no p/w worsening mental status found to have 1.5 cm acute L thalamic IPH with vasogenic edema  - 24h head CT showed stable hemorrhage  - elevated HOB 30 degrees   - keppra increased to1g bid per neuro recs  - Goal SBP <160  - labetalol 400mg BID  - q4h neuro checks  - EEG d'cd: no seizure seen on EEG, but noted to have potential epileptogenic focus in the right parietal region, cortical dysfunction and structural abnormality in the right hemisphere, and mild nonspecific diffuse or multifocal cerebral dysfunction  - MRI brain - with chronic microvascular ischemic changes, ischemia suspected involving the brainstem region. Abnormal susceptibility seen involving the right lower kaylyn which could be compatible areas of old hemorrhage mineralization or small cavernous angiomas.  - TTE showed increased LV wall thickness, bubble study was unsuccessful due to poor visualization.   - LE duplex negative for DVTs  - f/u hypercoagulable work up - results so far show decreased protein S activity, low factor II assay, high factor VIII assay

## 2020-03-06 NOTE — PROGRESS NOTE ADULT - SUBJECTIVE AND OBJECTIVE BOX
Patient is a 40y old  Male who presents with a chief complaint of hemorrhagic cva (01 Mar 2020 08:57)      SUBJECTIVE / OVERNIGHT EVENTS:    No events overnight. This AM, patient without f/c/n/v/d/cp/sob.    MEDICATIONS  (STANDING):  amitriptyline 50 milliGRAM(s) Oral daily  amLODIPine   Tablet 10 milliGRAM(s) Oral daily  atorvastatin 10 milliGRAM(s) Oral at bedtime  chlorhexidine 0.12% Liquid 15 milliLiter(s) Oral Mucosa two times a day  cloNIDine Patch 0.1 mG/24Hr(s) 1 patch Transdermal every 7 days  famotidine   Suspension 20 milliGRAM(s) Oral daily  hydrALAZINE 100 milliGRAM(s) Oral three times a day  labetalol 400 milliGRAM(s) Oral every 8 hours  levETIRAcetam 1000 milliGRAM(s) Oral two times a day  melatonin 3 milliGRAM(s) Oral at bedtime  OLANZapine 5 milliGRAM(s) Oral at bedtime  vancomycin  IVPB 750 milliGRAM(s) IV Intermittent every 12 hours    MEDICATIONS  (PRN):  hydrALAZINE Injectable 10 milliGRAM(s) IV Push once PRN hypertension SBP>180      PHYSICAL EXAM:  T(C): 36.8 (03-06-20 @ 12:45), Max: 36.9 (03-06-20 @ 06:25)  HR: 70 (03-06-20 @ 12:45) (62 - 70)  BP: 122/75 (03-06-20 @ 12:45) (114/78 - 131/79)  RR: 18 (03-06-20 @ 12:45) (15 - 18)  SpO2: 96% (03-06-20 @ 12:45) (96% - 98%)  I&O's Summary    GENERAL: NAD, well-developed, lying in bed, pleasant  HEAD:  Atraumatic, Normocephalic, MMM  CHEST/LUNG: Clear to auscultation bilaterally; No wheeze  HEART: Regular rate and rhythm; No murmurs, rubs, or gallops  ABDOMEN: Soft, Nontender, Nondistended; Bowel sounds present  EXTREMITIES:  2+ Peripheral Pulses, No clubbing, cyanosis, or edema  PSYCH: AAOx3  NEUROLOGY: CN II-XII grossly intact, able to move R arm, minimal movement of R leg; no movement of L arm or L leg  lesions      LABS:  CAPILLARY BLOOD GLUCOSE                              10.1   4.88  )-----------( 296      ( 06 Mar 2020 06:02 )             30.4     03-06    139  |  102  |  22  ----------------------------<  90  3.8   |  23  |  1.05    Ca    9.5      06 Mar 2020 06:02  Phos  3.8     03-06  Mg     1.9     03-06                  RADIOLOGY & ADDITIONAL TESTS:    Telemetry Personally Reviewed -     Imaging Personally Reviewed -     Imaging Reviewed -     Consultant(s) Notes Reviewed -       Care Discussed with Consultants/Other Providers -

## 2020-03-07 LAB
ANION GAP SERPL CALC-SCNC: 13 MMO/L — SIGNIFICANT CHANGE UP (ref 7–14)
BUN SERPL-MCNC: 20 MG/DL — SIGNIFICANT CHANGE UP (ref 7–23)
CALCIUM SERPL-MCNC: 9.5 MG/DL — SIGNIFICANT CHANGE UP (ref 8.4–10.5)
CHLORIDE SERPL-SCNC: 101 MMOL/L — SIGNIFICANT CHANGE UP (ref 98–107)
CO2 SERPL-SCNC: 25 MMOL/L — SIGNIFICANT CHANGE UP (ref 22–31)
CREAT SERPL-MCNC: 1.01 MG/DL — SIGNIFICANT CHANGE UP (ref 0.5–1.3)
GLUCOSE SERPL-MCNC: 93 MG/DL — SIGNIFICANT CHANGE UP (ref 70–99)
HCT VFR BLD CALC: 32.1 % — LOW (ref 39–50)
HGB BLD-MCNC: 10.5 G/DL — LOW (ref 13–17)
MAGNESIUM SERPL-MCNC: 1.9 MG/DL — SIGNIFICANT CHANGE UP (ref 1.6–2.6)
MCHC RBC-ENTMCNC: 29.6 PG — SIGNIFICANT CHANGE UP (ref 27–34)
MCHC RBC-ENTMCNC: 32.7 % — SIGNIFICANT CHANGE UP (ref 32–36)
MCV RBC AUTO: 90.4 FL — SIGNIFICANT CHANGE UP (ref 80–100)
NRBC # FLD: 0 K/UL — SIGNIFICANT CHANGE UP (ref 0–0)
PHOSPHATE SERPL-MCNC: 3.7 MG/DL — SIGNIFICANT CHANGE UP (ref 2.5–4.5)
PLATELET # BLD AUTO: 268 K/UL — SIGNIFICANT CHANGE UP (ref 150–400)
PMV BLD: 10 FL — SIGNIFICANT CHANGE UP (ref 7–13)
POTASSIUM SERPL-MCNC: 3.6 MMOL/L — SIGNIFICANT CHANGE UP (ref 3.5–5.3)
POTASSIUM SERPL-SCNC: 3.6 MMOL/L — SIGNIFICANT CHANGE UP (ref 3.5–5.3)
RBC # BLD: 3.55 M/UL — LOW (ref 4.2–5.8)
RBC # FLD: 13.2 % — SIGNIFICANT CHANGE UP (ref 10.3–14.5)
SODIUM SERPL-SCNC: 139 MMOL/L — SIGNIFICANT CHANGE UP (ref 135–145)
WBC # BLD: 4.64 K/UL — SIGNIFICANT CHANGE UP (ref 3.8–10.5)
WBC # FLD AUTO: 4.64 K/UL — SIGNIFICANT CHANGE UP (ref 3.8–10.5)

## 2020-03-07 PROCEDURE — 99233 SBSQ HOSP IP/OBS HIGH 50: CPT

## 2020-03-07 RX ORDER — SENNA PLUS 8.6 MG/1
2 TABLET ORAL AT BEDTIME
Refills: 0 | Status: DISCONTINUED | OUTPATIENT
Start: 2020-03-07 | End: 2020-03-10

## 2020-03-07 RX ADMIN — CHLORHEXIDINE GLUCONATE 15 MILLILITER(S): 213 SOLUTION TOPICAL at 18:15

## 2020-03-07 RX ADMIN — Medication 100 MILLIGRAM(S): at 21:51

## 2020-03-07 RX ADMIN — AMLODIPINE BESYLATE 10 MILLIGRAM(S): 2.5 TABLET ORAL at 05:58

## 2020-03-07 RX ADMIN — Medication 1 PATCH: at 19:14

## 2020-03-07 RX ADMIN — Medication 100 MILLIGRAM(S): at 14:18

## 2020-03-07 RX ADMIN — Medication 1 PATCH: at 07:22

## 2020-03-07 RX ADMIN — LEVETIRACETAM 1000 MILLIGRAM(S): 250 TABLET, FILM COATED ORAL at 05:58

## 2020-03-07 RX ADMIN — CHLORHEXIDINE GLUCONATE 15 MILLILITER(S): 213 SOLUTION TOPICAL at 05:58

## 2020-03-07 RX ADMIN — Medication 400 MILLIGRAM(S): at 14:18

## 2020-03-07 RX ADMIN — Medication 50 MILLIGRAM(S): at 14:18

## 2020-03-07 RX ADMIN — LEVETIRACETAM 1000 MILLIGRAM(S): 250 TABLET, FILM COATED ORAL at 18:15

## 2020-03-07 RX ADMIN — ATORVASTATIN CALCIUM 10 MILLIGRAM(S): 80 TABLET, FILM COATED ORAL at 21:51

## 2020-03-07 RX ADMIN — OLANZAPINE 5 MILLIGRAM(S): 15 TABLET, FILM COATED ORAL at 21:51

## 2020-03-07 RX ADMIN — Medication 250 MILLIGRAM(S): at 21:52

## 2020-03-07 RX ADMIN — SENNA PLUS 2 TABLET(S): 8.6 TABLET ORAL at 21:53

## 2020-03-07 RX ADMIN — FAMOTIDINE 20 MILLIGRAM(S): 10 INJECTION INTRAVENOUS at 14:18

## 2020-03-07 RX ADMIN — Medication 250 MILLIGRAM(S): at 10:46

## 2020-03-07 RX ADMIN — Medication 3 MILLIGRAM(S): at 21:51

## 2020-03-07 RX ADMIN — Medication 400 MILLIGRAM(S): at 21:51

## 2020-03-07 RX ADMIN — Medication 100 MILLIGRAM(S): at 05:58

## 2020-03-07 NOTE — PROGRESS NOTE ADULT - PROBLEM SELECTOR PLAN 3
On vancomycin  F/U ID  CT lumbar spine/Thoracic done results above.    TTE without vegetation  Will need 4 weeks of antibiotics thru 3/24

## 2020-03-07 NOTE — PROGRESS NOTE ADULT - SUBJECTIVE AND OBJECTIVE BOX
Patient is a 40y old  Male who presents with a chief complaint of hemorrhagic cva (01 Mar 2020 08:57)    SUBJECTIVE / OVERNIGHT EVENTS:    No events overnight. This AM, patient without f/c/n/v/d/cp/sob.    MEDICATIONS  (STANDING):  amitriptyline 50 milliGRAM(s) Oral daily  amLODIPine   Tablet 10 milliGRAM(s) Oral daily  atorvastatin 10 milliGRAM(s) Oral at bedtime  chlorhexidine 0.12% Liquid 15 milliLiter(s) Oral Mucosa two times a day  cloNIDine Patch 0.1 mG/24Hr(s) 1 patch Transdermal every 7 days  famotidine   Suspension 20 milliGRAM(s) Oral daily  hydrALAZINE 100 milliGRAM(s) Oral three times a day  labetalol 400 milliGRAM(s) Oral every 8 hours  levETIRAcetam 1000 milliGRAM(s) Oral two times a day  melatonin 3 milliGRAM(s) Oral at bedtime  OLANZapine 5 milliGRAM(s) Oral at bedtime  senna 2 Tablet(s) Oral at bedtime  vancomycin  IVPB 750 milliGRAM(s) IV Intermittent every 12 hours    MEDICATIONS  (PRN):  bisacodyl Suppository 10 milliGRAM(s) Rectal once PRN Constipation  hydrALAZINE Injectable 10 milliGRAM(s) IV Push once PRN hypertension SBP>180      PHYSICAL EXAM:  T(C): 36.7 (03-07-20 @ 13:22), Max: 37.1 (03-06-20 @ 22:52)  HR: 77 (03-07-20 @ 13:22) (60 - 77)  BP: 125/74 (03-07-20 @ 13:22) (109/68 - 136/82)  RR: 17 (03-07-20 @ 13:22) (17 - 18)  SpO2: 97% (03-07-20 @ 13:22) (97% - 99%)  I&O's Summary    06 Mar 2020 07:01  -  07 Mar 2020 07:00  --------------------------------------------------------  IN: 0 mL / OUT: 1150 mL / NET: -1150 mL    GENERAL: NAD, well-developed, lying in bed, pleasant  HEAD:  Atraumatic, Normocephalic, MMM  CHEST/LUNG: Clear to auscultation bilaterally; No wheeze  HEART: Regular rate and rhythm; No murmurs, rubs, or gallops  ABDOMEN: Soft, Nontender, Nondistended; Bowel sounds present  EXTREMITIES:  2+ Peripheral Pulses, No clubbing, cyanosis, or edema  PSYCH: AAOx3  NEUROLOGY: CN II-XII grossly intact, able to move R arm, minimal movement of R leg; no movement of L arm or L leg  lesions      LABS:  CAPILLARY BLOOD GLUCOSE                              10.5   4.64  )-----------( 268      ( 07 Mar 2020 05:24 )             32.1     03-07    139  |  101  |  20  ----------------------------<  93  3.6   |  25  |  1.01    Ca    9.5      07 Mar 2020 05:24  Phos  3.7     03-07  Mg     1.9     03-07                  RADIOLOGY & ADDITIONAL TESTS:    Telemetry Personally Reviewed -     Imaging Personally Reviewed -     Imaging Reviewed -     Consultant(s) Notes Reviewed -       Care Discussed with Consultants/Other Providers -

## 2020-03-08 LAB
ANION GAP SERPL CALC-SCNC: 14 MMO/L — SIGNIFICANT CHANGE UP (ref 7–14)
BUN SERPL-MCNC: 19 MG/DL — SIGNIFICANT CHANGE UP (ref 7–23)
CALCIUM SERPL-MCNC: 9.3 MG/DL — SIGNIFICANT CHANGE UP (ref 8.4–10.5)
CHLORIDE SERPL-SCNC: 101 MMOL/L — SIGNIFICANT CHANGE UP (ref 98–107)
CO2 SERPL-SCNC: 21 MMOL/L — LOW (ref 22–31)
CREAT SERPL-MCNC: 0.89 MG/DL — SIGNIFICANT CHANGE UP (ref 0.5–1.3)
GLUCOSE SERPL-MCNC: 78 MG/DL — SIGNIFICANT CHANGE UP (ref 70–99)
HCT VFR BLD CALC: 30.4 % — LOW (ref 39–50)
HGB BLD-MCNC: 9.6 G/DL — LOW (ref 13–17)
MAGNESIUM SERPL-MCNC: 1.8 MG/DL — SIGNIFICANT CHANGE UP (ref 1.6–2.6)
MCHC RBC-ENTMCNC: 30.1 PG — SIGNIFICANT CHANGE UP (ref 27–34)
MCHC RBC-ENTMCNC: 31.6 % — LOW (ref 32–36)
MCV RBC AUTO: 95.3 FL — SIGNIFICANT CHANGE UP (ref 80–100)
NRBC # FLD: 0 K/UL — SIGNIFICANT CHANGE UP (ref 0–0)
PHOSPHATE SERPL-MCNC: 3.6 MG/DL — SIGNIFICANT CHANGE UP (ref 2.5–4.5)
PLATELET # BLD AUTO: 238 K/UL — SIGNIFICANT CHANGE UP (ref 150–400)
PMV BLD: 10.2 FL — SIGNIFICANT CHANGE UP (ref 7–13)
POTASSIUM SERPL-MCNC: 3.8 MMOL/L — SIGNIFICANT CHANGE UP (ref 3.5–5.3)
POTASSIUM SERPL-SCNC: 3.8 MMOL/L — SIGNIFICANT CHANGE UP (ref 3.5–5.3)
RBC # BLD: 3.19 M/UL — LOW (ref 4.2–5.8)
RBC # FLD: 13.1 % — SIGNIFICANT CHANGE UP (ref 10.3–14.5)
SODIUM SERPL-SCNC: 136 MMOL/L — SIGNIFICANT CHANGE UP (ref 135–145)
WBC # BLD: 4.37 K/UL — SIGNIFICANT CHANGE UP (ref 3.8–10.5)
WBC # FLD AUTO: 4.37 K/UL — SIGNIFICANT CHANGE UP (ref 3.8–10.5)

## 2020-03-08 PROCEDURE — 99233 SBSQ HOSP IP/OBS HIGH 50: CPT

## 2020-03-08 RX ADMIN — Medication 250 MILLIGRAM(S): at 22:22

## 2020-03-08 RX ADMIN — Medication 1 PATCH: at 06:42

## 2020-03-08 RX ADMIN — SENNA PLUS 2 TABLET(S): 8.6 TABLET ORAL at 22:21

## 2020-03-08 RX ADMIN — Medication 100 MILLIGRAM(S): at 06:33

## 2020-03-08 RX ADMIN — AMLODIPINE BESYLATE 10 MILLIGRAM(S): 2.5 TABLET ORAL at 13:41

## 2020-03-08 RX ADMIN — Medication 100 MILLIGRAM(S): at 22:21

## 2020-03-08 RX ADMIN — FAMOTIDINE 20 MILLIGRAM(S): 10 INJECTION INTRAVENOUS at 17:31

## 2020-03-08 RX ADMIN — Medication 100 MILLIGRAM(S): at 13:40

## 2020-03-08 RX ADMIN — CHLORHEXIDINE GLUCONATE 15 MILLILITER(S): 213 SOLUTION TOPICAL at 06:32

## 2020-03-08 RX ADMIN — LEVETIRACETAM 1000 MILLIGRAM(S): 250 TABLET, FILM COATED ORAL at 06:33

## 2020-03-08 RX ADMIN — Medication 400 MILLIGRAM(S): at 13:40

## 2020-03-08 RX ADMIN — Medication 250 MILLIGRAM(S): at 11:42

## 2020-03-08 RX ADMIN — CHLORHEXIDINE GLUCONATE 15 MILLILITER(S): 213 SOLUTION TOPICAL at 17:30

## 2020-03-08 RX ADMIN — Medication 400 MILLIGRAM(S): at 22:21

## 2020-03-08 RX ADMIN — Medication 3 MILLIGRAM(S): at 22:21

## 2020-03-08 RX ADMIN — Medication 50 MILLIGRAM(S): at 13:41

## 2020-03-08 RX ADMIN — ATORVASTATIN CALCIUM 10 MILLIGRAM(S): 80 TABLET, FILM COATED ORAL at 22:21

## 2020-03-08 RX ADMIN — OLANZAPINE 5 MILLIGRAM(S): 15 TABLET, FILM COATED ORAL at 22:21

## 2020-03-08 RX ADMIN — LEVETIRACETAM 1000 MILLIGRAM(S): 250 TABLET, FILM COATED ORAL at 17:30

## 2020-03-08 RX ADMIN — Medication 1 PATCH: at 20:39

## 2020-03-08 NOTE — PROGRESS NOTE ADULT - SUBJECTIVE AND OBJECTIVE BOX
Patient is a 40y old  Male who presents with a chief complaint of hemorrhagic cva (01 Mar 2020 08:57)      SUBJECTIVE / OVERNIGHT EVENTS:    No events overnight. This AM, patient without f/c/n/v/d/cp/sob.    MEDICATIONS  (STANDING):  amitriptyline 50 milliGRAM(s) Oral daily  amLODIPine   Tablet 10 milliGRAM(s) Oral daily  atorvastatin 10 milliGRAM(s) Oral at bedtime  chlorhexidine 0.12% Liquid 15 milliLiter(s) Oral Mucosa two times a day  cloNIDine Patch 0.1 mG/24Hr(s) 1 patch Transdermal every 7 days  famotidine   Suspension 20 milliGRAM(s) Oral daily  hydrALAZINE 100 milliGRAM(s) Oral three times a day  labetalol 400 milliGRAM(s) Oral every 8 hours  levETIRAcetam 1000 milliGRAM(s) Oral two times a day  melatonin 3 milliGRAM(s) Oral at bedtime  OLANZapine 5 milliGRAM(s) Oral at bedtime  senna 2 Tablet(s) Oral at bedtime  vancomycin  IVPB 750 milliGRAM(s) IV Intermittent every 12 hours    MEDICATIONS  (PRN):  bisacodyl Suppository 10 milliGRAM(s) Rectal once PRN Constipation  hydrALAZINE Injectable 10 milliGRAM(s) IV Push once PRN hypertension SBP>180      PHYSICAL EXAM:  T(C): 37 (03-08-20 @ 06:30), Max: 37 (03-07-20 @ 23:23)  HR: 57 (03-08-20 @ 06:30) (57 - 88)  BP: 125/82 (03-08-20 @ 06:30) (125/74 - 142/90)  RR: 18 (03-08-20 @ 06:30) (17 - 18)  SpO2: 99% (03-08-20 @ 06:30) (97% - 99%)  I&O's Summary    07 Mar 2020 06:01  -  08 Mar 2020 07:00  --------------------------------------------------------  IN: 0 mL / OUT: 950 mL / NET: -950 mL      GENERAL: NAD, well-developed, lying in bed, pleasant  HEAD:  Atraumatic, Normocephalic, MMM  CHEST/LUNG: Clear to auscultation bilaterally; No wheeze  HEART: Regular rate and rhythm; No murmurs, rubs, or gallops  ABDOMEN: Soft, Nontender, Nondistended; Bowel sounds present  EXTREMITIES:  2+ Peripheral Pulses, No clubbing, cyanosis, or edema  PSYCH: AAOx3  NEUROLOGY: CN II-XII grossly intact, able to move R arm, minimal movement of R leg; no movement of L arm or L leg  lesions      LABS:  CAPILLARY BLOOD GLUCOSE                              9.6    4.37  )-----------( 238      ( 08 Mar 2020 05:41 )             30.4     03-08    136  |  101  |  19  ----------------------------<  78  3.8   |  21<L>  |  0.89    Ca    9.3      08 Mar 2020 05:41  Phos  3.6     03-08  Mg     1.8     03-08                  RADIOLOGY & ADDITIONAL TESTS:    Telemetry Personally Reviewed -     Imaging Personally Reviewed -     Imaging Reviewed -     Consultant(s) Notes Reviewed -       Care Discussed with Consultants/Other Providers -

## 2020-03-09 DIAGNOSIS — K59.00 CONSTIPATION, UNSPECIFIED: ICD-10-CM

## 2020-03-09 LAB
ANION GAP SERPL CALC-SCNC: 13 MMO/L — SIGNIFICANT CHANGE UP (ref 7–14)
ANION GAP SERPL CALC-SCNC: 13 MMO/L — SIGNIFICANT CHANGE UP (ref 7–14)
BUN SERPL-MCNC: 19 MG/DL — SIGNIFICANT CHANGE UP (ref 7–23)
BUN SERPL-MCNC: 19 MG/DL — SIGNIFICANT CHANGE UP (ref 7–23)
CALCIUM SERPL-MCNC: 9.4 MG/DL — SIGNIFICANT CHANGE UP (ref 8.4–10.5)
CALCIUM SERPL-MCNC: 9.4 MG/DL — SIGNIFICANT CHANGE UP (ref 8.4–10.5)
CHLORIDE SERPL-SCNC: 101 MMOL/L — SIGNIFICANT CHANGE UP (ref 98–107)
CHLORIDE SERPL-SCNC: 101 MMOL/L — SIGNIFICANT CHANGE UP (ref 98–107)
CO2 SERPL-SCNC: 25 MMOL/L — SIGNIFICANT CHANGE UP (ref 22–31)
CO2 SERPL-SCNC: 25 MMOL/L — SIGNIFICANT CHANGE UP (ref 22–31)
CREAT SERPL-MCNC: 1 MG/DL — SIGNIFICANT CHANGE UP (ref 0.5–1.3)
CREAT SERPL-MCNC: 1 MG/DL — SIGNIFICANT CHANGE UP (ref 0.5–1.3)
GLUCOSE SERPL-MCNC: 87 MG/DL — SIGNIFICANT CHANGE UP (ref 70–99)
GLUCOSE SERPL-MCNC: 87 MG/DL — SIGNIFICANT CHANGE UP (ref 70–99)
HCT VFR BLD CALC: 31.3 % — LOW (ref 39–50)
HGB BLD-MCNC: 10 G/DL — LOW (ref 13–17)
MAGNESIUM SERPL-MCNC: 1.9 MG/DL — SIGNIFICANT CHANGE UP (ref 1.6–2.6)
MCHC RBC-ENTMCNC: 29.5 PG — SIGNIFICANT CHANGE UP (ref 27–34)
MCHC RBC-ENTMCNC: 31.9 % — LOW (ref 32–36)
MCV RBC AUTO: 92.3 FL — SIGNIFICANT CHANGE UP (ref 80–100)
NRBC # FLD: 0 K/UL — SIGNIFICANT CHANGE UP (ref 0–0)
PHOSPHATE SERPL-MCNC: 3.8 MG/DL — SIGNIFICANT CHANGE UP (ref 2.5–4.5)
PLATELET # BLD AUTO: 255 K/UL — SIGNIFICANT CHANGE UP (ref 150–400)
PMV BLD: 10.3 FL — SIGNIFICANT CHANGE UP (ref 7–13)
POTASSIUM SERPL-MCNC: 3.3 MMOL/L — LOW (ref 3.5–5.3)
POTASSIUM SERPL-MCNC: 3.3 MMOL/L — LOW (ref 3.5–5.3)
POTASSIUM SERPL-SCNC: 3.3 MMOL/L — LOW (ref 3.5–5.3)
POTASSIUM SERPL-SCNC: 3.3 MMOL/L — LOW (ref 3.5–5.3)
RBC # BLD: 3.39 M/UL — LOW (ref 4.2–5.8)
RBC # FLD: 13.2 % — SIGNIFICANT CHANGE UP (ref 10.3–14.5)
SODIUM SERPL-SCNC: 139 MMOL/L — SIGNIFICANT CHANGE UP (ref 135–145)
SODIUM SERPL-SCNC: 139 MMOL/L — SIGNIFICANT CHANGE UP (ref 135–145)
WBC # BLD: 3.66 K/UL — LOW (ref 3.8–10.5)
WBC # FLD AUTO: 3.66 K/UL — LOW (ref 3.8–10.5)

## 2020-03-09 PROCEDURE — 99233 SBSQ HOSP IP/OBS HIGH 50: CPT

## 2020-03-09 RX ORDER — POTASSIUM CHLORIDE 20 MEQ
40 PACKET (EA) ORAL EVERY 4 HOURS
Refills: 0 | Status: COMPLETED | OUTPATIENT
Start: 2020-03-09 | End: 2020-03-09

## 2020-03-09 RX ORDER — POLYETHYLENE GLYCOL 3350 17 G/17G
17 POWDER, FOR SOLUTION ORAL DAILY
Refills: 0 | Status: DISCONTINUED | OUTPATIENT
Start: 2020-03-09 | End: 2020-03-10

## 2020-03-09 RX ADMIN — LEVETIRACETAM 1000 MILLIGRAM(S): 250 TABLET, FILM COATED ORAL at 17:57

## 2020-03-09 RX ADMIN — OLANZAPINE 5 MILLIGRAM(S): 15 TABLET, FILM COATED ORAL at 21:58

## 2020-03-09 RX ADMIN — Medication 1 PATCH: at 06:19

## 2020-03-09 RX ADMIN — Medication 250 MILLIGRAM(S): at 21:58

## 2020-03-09 RX ADMIN — Medication 100 MILLIGRAM(S): at 13:20

## 2020-03-09 RX ADMIN — FAMOTIDINE 20 MILLIGRAM(S): 10 INJECTION INTRAVENOUS at 13:20

## 2020-03-09 RX ADMIN — Medication 40 MILLIEQUIVALENT(S): at 17:49

## 2020-03-09 RX ADMIN — Medication 400 MILLIGRAM(S): at 13:20

## 2020-03-09 RX ADMIN — AMLODIPINE BESYLATE 10 MILLIGRAM(S): 2.5 TABLET ORAL at 06:19

## 2020-03-09 RX ADMIN — CHLORHEXIDINE GLUCONATE 15 MILLILITER(S): 213 SOLUTION TOPICAL at 06:18

## 2020-03-09 RX ADMIN — Medication 10 MILLIGRAM(S): at 17:48

## 2020-03-09 RX ADMIN — Medication 100 MILLIGRAM(S): at 21:58

## 2020-03-09 RX ADMIN — Medication 3 MILLIGRAM(S): at 21:58

## 2020-03-09 RX ADMIN — Medication 100 MILLIGRAM(S): at 06:18

## 2020-03-09 RX ADMIN — Medication 40 MILLIEQUIVALENT(S): at 13:19

## 2020-03-09 RX ADMIN — Medication 250 MILLIGRAM(S): at 09:00

## 2020-03-09 RX ADMIN — LEVETIRACETAM 1000 MILLIGRAM(S): 250 TABLET, FILM COATED ORAL at 06:18

## 2020-03-09 RX ADMIN — ATORVASTATIN CALCIUM 10 MILLIGRAM(S): 80 TABLET, FILM COATED ORAL at 21:58

## 2020-03-09 RX ADMIN — Medication 400 MILLIGRAM(S): at 06:19

## 2020-03-09 RX ADMIN — CHLORHEXIDINE GLUCONATE 15 MILLILITER(S): 213 SOLUTION TOPICAL at 17:48

## 2020-03-09 RX ADMIN — Medication 400 MILLIGRAM(S): at 21:58

## 2020-03-09 RX ADMIN — Medication 50 MILLIGRAM(S): at 13:20

## 2020-03-09 NOTE — PROGRESS NOTE ADULT - ASSESSMENT
39M HTN, ICH Sept 2019 w/ L hemiparesis p/w acute Left CR ICH w/ brain edema c/b MRSA bacteremia, constipation.

## 2020-03-09 NOTE — PROGRESS NOTE ADULT - PROBLEM SELECTOR PLAN 1
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  - Goal SBP <160  - avoid A/C for now.  - tight BP control

## 2020-03-09 NOTE — PROGRESS NOTE ADULT - PROBLEM SELECTOR PLAN 5
Disposition: [facility] via [facility]upinder - however will need to clarify post-IV Abx disposition prior to being accepted to [facility].    Transitions of Care Status:  1.  Name of PCP: No PMD  2.  PCP Contacted on Admission: [ ] Y    [ ] N    3.  PCP contacted at Discharge: [ ] Y    [ ] N    [ ] N/A  4.  Post-Discharge Appointment Date and Location:  5.  Summary of Handoff given to PCP:

## 2020-03-09 NOTE — PROGRESS NOTE ADULT - SUBJECTIVE AND OBJECTIVE BOX
[facility] Division of Hospital Medicine  Pipe Alonzo MD  Pager (M-F, 8A-5P): [phone]  Other Times:  [phone]    Patient is a 40y old  Male who presents with a chief complaint of hemorrhagic cva (01 Mar 2020 08:57)    SUBJECTIVE / OVERNIGHT EVENTS:  Complaining of constipation - no BM in past two days.  No F/C, N/V, CP, SOB, Cough, lightheadedness, dizziness, abdominal pain, diarrhea, dysuria.    MEDICATIONS  (STANDING):  amitriptyline 50 milliGRAM(s) Oral daily  amLODIPine   Tablet 10 milliGRAM(s) Oral daily  atorvastatin 10 milliGRAM(s) Oral at bedtime  chlorhexidine 0.12% Liquid 15 milliLiter(s) Oral Mucosa two times a day  cloNIDine Patch 0.1 mG/24Hr(s) 1 patch Transdermal every 7 days  famotidine   Suspension 20 milliGRAM(s) Oral daily  hydrALAZINE 100 milliGRAM(s) Oral three times a day  labetalol 400 milliGRAM(s) Oral every 8 hours  levETIRAcetam 1000 milliGRAM(s) Oral two times a day  melatonin 3 milliGRAM(s) Oral at bedtime  OLANZapine 5 milliGRAM(s) Oral at bedtime  potassium chloride    Tablet ER 40 milliEquivalent(s) Oral every 4 hours  senna 2 Tablet(s) Oral at bedtime  vancomycin  IVPB 750 milliGRAM(s) IV Intermittent every 12 hours    MEDICATIONS  (PRN):  bisacodyl Suppository 10 milliGRAM(s) Rectal once PRN Constipation      Vital Signs Last 24 Hrs  T(C): 37.1 (09 Mar 2020 13:18), Max: 37.1 (09 Mar 2020 13:18)  T(F): 98.8 (09 Mar 2020 13:18), Max: 98.8 (09 Mar 2020 13:18)  HR: 73 (09 Mar 2020 13:18) (55 - 78)  BP: 120/70 (09 Mar 2020 13:18) (118/72 - 145/86)  BP(mean): --  RR: 17 (09 Mar 2020 13:18) (16 - 19)  SpO2: 96% (09 Mar 2020 13:18) (96% - 100%)  CAPILLARY BLOOD GLUCOSE        I&O's Summary      PHYSICAL EXAM:  GENERAL: NAD, well-developed, lying in bed, pleasant  HEAD:  Atraumatic, Normocephalic, MMM  CHEST/LUNG: Clear to auscultation bilaterally; No wheeze  HEART: Regular rate and rhythm; No murmurs, rubs, or gallops  ABDOMEN: Soft, Nontender, Nondistended; Bowel sounds present  EXTREMITIES:  2+ Peripheral Pulses, No clubbing, cyanosis, or edema  PSYCH: AAOx3  NEUROLOGY: CN II-XII grossly intact, able to move R arm, minimal movement of R leg; no movement of L arm or L leg  SKIN: No rashes or lesions    LABS:                        10.0   3.66  )-----------( 255      ( 09 Mar 2020 05:46 )             31.3     03-09    139  |  101  |  19  ----------------------------<  87  3.3<L>   |  25  |  1.00    Ca    9.4      09 Mar 2020 05:46  Phos  3.8     03-09  Mg     1.9     03-09                RADIOLOGY & ADDITIONAL TESTS:    Imaging Personally Reviewed:    Care Discussed with Consultants/Other Providers:

## 2020-03-10 VITALS
TEMPERATURE: 98 F | RESPIRATION RATE: 19 BRPM | HEART RATE: 62 BPM | SYSTOLIC BLOOD PRESSURE: 128 MMHG | OXYGEN SATURATION: 96 % | DIASTOLIC BLOOD PRESSURE: 74 MMHG

## 2020-03-10 LAB
ANION GAP SERPL CALC-SCNC: 11 MMO/L — SIGNIFICANT CHANGE UP (ref 7–14)
BUN SERPL-MCNC: 19 MG/DL — SIGNIFICANT CHANGE UP (ref 7–23)
CALCIUM SERPL-MCNC: 9.5 MG/DL — SIGNIFICANT CHANGE UP (ref 8.4–10.5)
CHLORIDE SERPL-SCNC: 104 MMOL/L — SIGNIFICANT CHANGE UP (ref 98–107)
CO2 SERPL-SCNC: 24 MMOL/L — SIGNIFICANT CHANGE UP (ref 22–31)
CREAT SERPL-MCNC: 0.96 MG/DL — SIGNIFICANT CHANGE UP (ref 0.5–1.3)
GLUCOSE SERPL-MCNC: 88 MG/DL — SIGNIFICANT CHANGE UP (ref 70–99)
HCT VFR BLD CALC: 30.1 % — LOW (ref 39–50)
HGB BLD-MCNC: 9.8 G/DL — LOW (ref 13–17)
LACTATE SERPL-SCNC: 0.6 MMOL/L — SIGNIFICANT CHANGE UP (ref 0.5–2)
MAGNESIUM SERPL-MCNC: 2 MG/DL — SIGNIFICANT CHANGE UP (ref 1.6–2.6)
MCHC RBC-ENTMCNC: 29.5 PG — SIGNIFICANT CHANGE UP (ref 27–34)
MCHC RBC-ENTMCNC: 32.6 % — SIGNIFICANT CHANGE UP (ref 32–36)
MCV RBC AUTO: 90.7 FL — SIGNIFICANT CHANGE UP (ref 80–100)
NRBC # FLD: 0 K/UL — SIGNIFICANT CHANGE UP (ref 0–0)
PHOSPHATE SERPL-MCNC: 3.8 MG/DL — SIGNIFICANT CHANGE UP (ref 2.5–4.5)
PLATELET # BLD AUTO: 229 K/UL — SIGNIFICANT CHANGE UP (ref 150–400)
PMV BLD: 9.8 FL — SIGNIFICANT CHANGE UP (ref 7–13)
POTASSIUM SERPL-MCNC: 3.9 MMOL/L — SIGNIFICANT CHANGE UP (ref 3.5–5.3)
POTASSIUM SERPL-SCNC: 3.9 MMOL/L — SIGNIFICANT CHANGE UP (ref 3.5–5.3)
RBC # BLD: 3.32 M/UL — LOW (ref 4.2–5.8)
RBC # FLD: 13.1 % — SIGNIFICANT CHANGE UP (ref 10.3–14.5)
SODIUM SERPL-SCNC: 139 MMOL/L — SIGNIFICANT CHANGE UP (ref 135–145)
VANCOMYCIN FLD-MCNC: 17.7 UG/ML — SIGNIFICANT CHANGE UP
WBC # BLD: 3.86 K/UL — SIGNIFICANT CHANGE UP (ref 3.8–10.5)
WBC # FLD AUTO: 3.86 K/UL — SIGNIFICANT CHANGE UP (ref 3.8–10.5)

## 2020-03-10 PROCEDURE — 99233 SBSQ HOSP IP/OBS HIGH 50: CPT

## 2020-03-10 PROCEDURE — 70450 CT HEAD/BRAIN W/O DYE: CPT | Mod: 26

## 2020-03-10 RX ORDER — LABETALOL HCL 100 MG
2 TABLET ORAL
Qty: 180 | Refills: 0
Start: 2020-03-10 | End: 2020-04-08

## 2020-03-10 RX ORDER — LABETALOL HCL 100 MG
1 TABLET ORAL
Qty: 0 | Refills: 0 | DISCHARGE

## 2020-03-10 RX ORDER — HYDRALAZINE HCL 50 MG
1 TABLET ORAL
Qty: 90 | Refills: 0
Start: 2020-03-10 | End: 2020-04-08

## 2020-03-10 RX ORDER — LANOLIN ALCOHOL/MO/W.PET/CERES
1 CREAM (GRAM) TOPICAL
Qty: 30 | Refills: 0
Start: 2020-03-10 | End: 2020-04-08

## 2020-03-10 RX ORDER — OLANZAPINE 15 MG/1
1 TABLET, FILM COATED ORAL
Qty: 30 | Refills: 0
Start: 2020-03-10 | End: 2020-04-08

## 2020-03-10 RX ORDER — LIDOCAINE 4 G/100G
1 CREAM TOPICAL DAILY
Refills: 0 | Status: DISCONTINUED | OUTPATIENT
Start: 2020-03-10 | End: 2020-03-10

## 2020-03-10 RX ORDER — LEVETIRACETAM 250 MG/1
1 TABLET, FILM COATED ORAL
Qty: 60 | Refills: 0
Start: 2020-03-10 | End: 2020-04-08

## 2020-03-10 RX ORDER — AMITRIPTYLINE HCL 25 MG
1 TABLET ORAL
Qty: 30 | Refills: 0
Start: 2020-03-10 | End: 2020-04-08

## 2020-03-10 RX ORDER — VANCOMYCIN HCL 1 G
750 VIAL (EA) INTRAVENOUS
Qty: 21000 | Refills: 0
Start: 2020-03-10 | End: 2020-03-23

## 2020-03-10 RX ORDER — SENNA PLUS 8.6 MG/1
2 TABLET ORAL
Qty: 60 | Refills: 0
Start: 2020-03-10 | End: 2020-04-08

## 2020-03-10 RX ADMIN — Medication 400 MILLIGRAM(S): at 14:07

## 2020-03-10 RX ADMIN — POLYETHYLENE GLYCOL 3350 17 GRAM(S): 17 POWDER, FOR SOLUTION ORAL at 14:07

## 2020-03-10 RX ADMIN — Medication 100 MILLIGRAM(S): at 14:07

## 2020-03-10 RX ADMIN — AMLODIPINE BESYLATE 10 MILLIGRAM(S): 2.5 TABLET ORAL at 14:07

## 2020-03-10 RX ADMIN — Medication 50 MILLIGRAM(S): at 14:07

## 2020-03-10 RX ADMIN — Medication 250 MILLIGRAM(S): at 10:12

## 2020-03-10 RX ADMIN — LEVETIRACETAM 1000 MILLIGRAM(S): 250 TABLET, FILM COATED ORAL at 05:50

## 2020-03-10 RX ADMIN — LEVETIRACETAM 1000 MILLIGRAM(S): 250 TABLET, FILM COATED ORAL at 17:35

## 2020-03-10 RX ADMIN — FAMOTIDINE 20 MILLIGRAM(S): 10 INJECTION INTRAVENOUS at 17:36

## 2020-03-10 RX ADMIN — CHLORHEXIDINE GLUCONATE 15 MILLILITER(S): 213 SOLUTION TOPICAL at 17:35

## 2020-03-10 RX ADMIN — LIDOCAINE 1 PATCH: 4 CREAM TOPICAL at 11:51

## 2020-03-10 RX ADMIN — Medication 400 MILLIGRAM(S): at 05:50

## 2020-03-10 NOTE — ADVANCED PRACTICE NURSE CONSULT - ASSESSMENT
Patient first and last name, date of birth, procedure and correct site selected.   Midline placed under sterile technique, donning hat, mask, gown and full barrier. Placed a Bard Power Glide Pro 20G/ 10 CM catheter, lot # [id number] Exp. 6/30/21.  Patient tolerated procedure well. Patient denies any complaints of pain or tenderness. Midline flushed well with 10cc NS x2 positive blood return. Placement confirmed via Ultrasound.  Advised patient if he had any pain, tenderness, fever or  swelling, he should tell the nurses immediately.  Dressing secured with sterile dressing kit and STAT Lock in place. Report given to primary nurse, Naima and Dee Finnegan RN from Case Management.

## 2020-03-10 NOTE — CHART NOTE - NSCHARTNOTEFT_GEN_A_CORE
Notified by RN - patient's clonidine patch not present during site check. As per RN, unsure when the patch fell off. Patient due for Clonidine patch 3/11. Patient on other anti-hypertensive medications. Blood pressure currently stable. Will continue monitor BP. Notified by RN - patient's clonidine patch not present during site check. As per RN, unsure when the patch fell off. Patient due for Clonidine patch 3/11. Patient on other anti-hypertensive medications. Blood pressure currently stable. Will continue monitor BP.    Anayeli Kenny PA-C  [id number]

## 2020-03-10 NOTE — DISCHARGE NOTE NURSING/CASE MANAGEMENT/SOCIAL WORK - PATIENT PORTAL LINK FT
You can access the FollowMyHealth Patient Portal offered by [facility] by registering at the following website: [email or web address]. By joining [employer]’s FollowMyHealth portal, you will also be able to view your health information using other applications (apps) compatible with our system.

## 2020-03-10 NOTE — CHART NOTE - NSCHARTNOTEFT_GEN_A_CORE
Notified by RN - patient not responding to verbal stimuli or sternal rub. As per RN, she's had the patient the past few nights and he usually responds to sternal rub when stimulated and is able to tell her his name, birth date and the current date. However, he appeared more lethargic than normally. States the other nurses on the unit also stimulated patient with sternal rub but the patient would wake up and then go back to a "deep sleep." Patient seen and examined at bedside. Sternal rub performed and patient responded appropriately, waking up to the sound of his name. MAR called to bedside for further recommendations.     Vital Signs Last 24 Hrs  T(C): 36.9 (10 Mar 2020 04:45), Max: 37.1 (09 Mar 2020 13:18)  T(F): 98.5 (10 Mar 2020 04:45), Max: 98.8 (09 Mar 2020 13:18)  HR: 71 (10 Mar 2020 05:46) (64 - 78)  BP: 112/74 (10 Mar 2020 05:46) (107/71 - 128/81)  RR: 18 (10 Mar 2020 05:46) (16 - 18)  SpO2: 99% (10 Mar 2020 05:46) (95% - 100%)    On exam:   Patient laying awake in bed,  no new neurological deficits - able to move right arm, minimal movement of right leg; no movement of left arm or left leg - which is patient's baseline.     39M HTN, ICH Sept 2019 w/ L hemiparesis p/w acute Left CR ICH w/ brain edema c/b MRSA bacteremia, constipation.  -Will send VBG, and lactate with AM labs  -CTH non-contrast ordered  -Will continue to monitor    Anayeli Kenny PA-C  [id number]

## 2020-03-10 NOTE — DISCHARGE NOTE PROVIDER - NSDCMRMEDTOKEN_GEN_ALL_CORE_FT
amLODIPine 10 mg oral tablet: 1 tab(s) orally once a day  atorvastatin 10 mg oral tablet: 1 tab(s) orally once a day  famotidine 20 mg oral tablet: 1 cap(s) orally 2 times a day  labetalol 200 mg oral tablet: 1 tab(s) orally 2 times a day amitriptyline 50 mg oral tablet: 1 tab(s) orally once a day  amLODIPine 10 mg oral tablet: 1 tab(s) orally once a day  atorvastatin 10 mg oral tablet: 1 tab(s) orally once a day  cloNIDine 0.1 mg/24 hr transdermal film, extended release: 1 patch transdermal every 7 days  famotidine 20 mg oral tablet: 1 cap(s) orally 2 times a day  hydrALAZINE 100 mg oral tablet: 1 tab(s) orally 3 times a day  labetalol 200 mg oral tablet: 2 tab(s) orally every 8 hours  levETIRAcetam 1000 mg oral tablet: 1 tab(s) orally 2 times a day  melatonin 3 mg oral tablet: 1 tab(s) orally once a day (at bedtime)  OLANZapine 5 mg oral tablet: 1 tab(s) orally once a day (at bedtime)  senna oral tablet: 2 tab(s) orally once a day (at bedtime)  vancomycin 750 mg intravenous injection: 750 milligram(s) intravenous every 12 hours    FINISH: 3/24

## 2020-03-10 NOTE — DISCHARGE NOTE PROVIDER - CARE PROVIDER_API CALL
Neurology,   Patient should follow up with outpatient Neurology for repeat MRI brain w/ & w/o contrast in 2-3 months, MRA Head w/o contrast, MRA Neck w/ contrast, and possible conventional angiogram   Address: [address] [place] [address]  Phone: [phone]  Phone: (   )    -  Fax: (   )    -  Follow Up Time:

## 2020-03-10 NOTE — PROGRESS NOTE ADULT - PROBLEM SELECTOR PLAN 5
Disposition: [facility]     Transitions of Care Status:  1.  Name of PCP: No PMD  2.  PCP Contacted on Admission: [ ] Y    [ ] N    3.  PCP contacted at Discharge: [ ] Y    [ ] N    [ ] N/A  4.  Post-Discharge Appointment Date and Location:  5.  Summary of Handoff given to PCP:

## 2020-03-10 NOTE — PROGRESS NOTE ADULT - PROBLEM SELECTOR PLAN 1
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  - Goal SBP <160  - avoid A/C for now.  - tight BP control  - CT head negative for new bleed.  Encephalopathy resolved.

## 2020-03-10 NOTE — PROGRESS NOTE ADULT - PROBLEM SELECTOR PLAN 3
On vancomycin until 3/24  TTE without vegetation  Replace midline in anticipation for continuing abx as outpatient.

## 2020-03-10 NOTE — DISCHARGE NOTE PROVIDER - HOSPITAL COURSE
39M HTN, ICH Sept 2019 w/ L hemiparesis p/w acute Left CR ICH w/ brain edema c/b MRSA bacteremia, constipation.        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    - avoid A/C for now.    - CT head negative for new bleed. Encephalopathy resolved.         # Essential hypertension.    -on amlodipine and labetalol and hydralazine         # MRSA bacteremia    -On vancomycin until 3/24    -TTE without vegetation    -New midline placement 3/10          # Constipation, unspecified constipation type    -Miralax PRN.         # No AC for DVT prophylaxis given h/o ICH.        3/10: This case was discussed with Dr. Alonzo. Patient medically cleared and optimized for discharge. Medications were reviwed and sent to agreed upon pharmacy. 39M HTN, ICH Sept 2019 w/ L hemiparesis p/w acute Left CR ICH w/ brain edema c/b MRSA bacteremia, constipation.        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    - avoid A/C for now.    - CT head negative for new bleed. Encephalopathy resolved.     -Patient should follow up with outpatient Neurology for repeat MRI brain w/ & w/o contrast in 2-3 months, MRA Head w/o contrast, MRA Neck w/ contrast, and possible conventional angiogram     Address: [address] [place] [address]    Phone: [phone]        # Essential hypertension.    -on amlodipine and labetalol and hydralazine         # MRSA bacteremia    -On vancomycin until 3/24    -on discharge CBC/BMP, vanco trough once a week while on OPAT.     -TTE without vegetation    -New midline placement 3/10          # Constipation, unspecified constipation type    -Miralax PRN.         # No AC for DVT prophylaxis given h/o ICH.        3/10: This case was discussed with Dr. Alonzo. Patient medically cleared and optimized for discharge. Medications were reviwed and sent to agreed upon pharmacy.

## 2020-03-10 NOTE — PROGRESS NOTE ADULT - SUBJECTIVE AND OBJECTIVE BOX
[facility] Division of Hospital Medicine  Pipe Alonzo MD  Pager ([place], 8A-5P): [phone]  Other Times:  [phone]    Patient is a 40y old  Male who presents with a chief complaint of ICH (09 Mar 2020 15:25)    SUBJECTIVE / OVERNIGHT EVENTS:  Events from this AM noted.  Patient is back to his usual mental status at the time of examination.  Unable to recall except that he was very tired in AM and that we tried to wake him up.  No F/C, N/V, CP, SOB, Cough, lightheadedness, dizziness, abdominal pain, diarrhea, dysuria.    MEDICATIONS  (STANDING):  amitriptyline 50 milliGRAM(s) Oral daily  amLODIPine   Tablet 10 milliGRAM(s) Oral daily  atorvastatin 10 milliGRAM(s) Oral at bedtime  chlorhexidine 0.12% Liquid 15 milliLiter(s) Oral Mucosa two times a day  cloNIDine Patch 0.1 mG/24Hr(s) 1 patch Transdermal every 7 days  famotidine   Suspension 20 milliGRAM(s) Oral daily  hydrALAZINE 100 milliGRAM(s) Oral three times a day  labetalol 400 milliGRAM(s) Oral every 8 hours  levETIRAcetam 1000 milliGRAM(s) Oral two times a day  lidocaine   Patch 1 Patch Transdermal daily  melatonin 3 milliGRAM(s) Oral at bedtime  OLANZapine 5 milliGRAM(s) Oral at bedtime  polyethylene glycol 3350 17 Gram(s) Oral daily  senna 2 Tablet(s) Oral at bedtime  vancomycin  IVPB 750 milliGRAM(s) IV Intermittent every 12 hours    MEDICATIONS  (PRN):      Vital Signs Last 24 Hrs  T(C): 36.4 (10 Mar 2020 13:33), Max: 36.9 (10 Mar 2020 04:45)  T(F): 97.6 (10 Mar 2020 13:33), Max: 98.5 (10 Mar 2020 04:45)  HR: 62 (10 Mar 2020 13:33) (62 - 78)  BP: 128/74 (10 Mar 2020 13:33) (107/71 - 128/81)  BP(mean): --  RR: 19 (10 Mar 2020 13:33) (17 - 19)  SpO2: 96% (10 Mar 2020 13:33) (95% - 100%)  CAPILLARY BLOOD GLUCOSE      POCT Blood Glucose.: 103 mg/dL (10 Mar 2020 05:34)    I&O's Summary      PHYSICAL EXAM:  GENERAL: NAD, well-developed, lying in bed, pleasant  HEAD:  Atraumatic, Normocephalic, MMM  CHEST/LUNG: Clear to auscultation bilaterally; No wheeze  HEART: Regular rate and rhythm; No murmurs, rubs, or gallops  ABDOMEN: Soft, Nontender, Nondistended; Bowel sounds present  EXTREMITIES:  2+ Peripheral Pulses, No clubbing, cyanosis, or edema  PSYCH: AAOx3  NEUROLOGY: CN II-XII grossly intact, able to move R arm, minimal movement of R leg; no movement of L arm or L leg  SKIN: No rashes or lesions    LABS:                        9.8    3.86  )-----------( 229      ( 10 Mar 2020 06:10 )             30.1     03-10    139  |  104  |  19  ----------------------------<  88  3.9   |  24  |  0.96    Ca    9.5      10 Mar 2020 06:10  Phos  3.8     03-10  Mg     2.0     03-10                RADIOLOGY & ADDITIONAL TESTS:  < from: CT Head No Cont (03.10.20 @ 13:07) >  FINDINGS:    VENTRICLES AND SULCI:  Normal.  INTRA-AXIAL:  Right basal ganglia corona radiata encephalomalacia identified as seen on the prior with ex vacuo dilatation the right lateral ventricle. Left thalamocapsular region hemorrhage which is resolving compared with the prior 3/1/2020 now with residual edema recognized without associated high attenuation at this time.  EXTRA-AXIAL:  No mass or collection is seen.  VISUALIZED SINUSES:  Clear.  VISUALIZED MASTOIDS:  Clear.  CALVARIUM: Evidence of aright parietal craniotomy.  MISCELLANEOUS:  None.    IMPRESSION: Resolving left thalamocapsular region hemorrhage now with residual lucency identified in this region with high attenuation component resolved. No change in appearance of encephalomalacia in the right parietal and basal ganglia regions                  MILLA ANTONIO M.D., ATTENDING RADIOLOGIST  This document has been electronically signed. Mar 10 2020  1:59PM    < end of copied text >    Imaging Personally Reviewed:    Care Discussed with Consultants/Other Providers:

## 2020-03-10 NOTE — DISCHARGE NOTE PROVIDER - PROVIDER TOKENS
FREE:[LAST:[Neurology],PHONE:[(   )    -],FAX:[(   )    -],ADDRESS:[Patient should follow up with outpatient Neurology for repeat MRI brain w/ & w/o contrast in 2-3 months, MRA Head w/o contrast, MRA Neck w/ contrast, and possible conventional angiogram   Address: [address] [place] [address]  Phone: [phone]]]

## 2020-03-10 NOTE — DISCHARGE NOTE NURSING/CASE MANAGEMENT/SOCIAL WORK - NSDCCRNAME_GEN_ALL_CORE_FT
Evgeny for subacute rehab/ IVBX until 3/24 address: [address] [place] [phone], 5:30pm  via [facility]

## 2020-03-10 NOTE — PROGRESS NOTE ADULT - REASON FOR ADMISSION
ICH
Hemorrhagic cva
unresponsiveness
ICH
ICH

## 2020-03-11 PROBLEM — I63.9 CEREBRAL INFARCTION, UNSPECIFIED: Chronic | Status: ACTIVE | Noted: 2020-02-18

## 2020-03-11 PROBLEM — I10 ESSENTIAL (PRIMARY) HYPERTENSION: Chronic | Status: ACTIVE | Noted: 2020-02-18

## 2020-03-25 ENCOUNTER — EMERGENCY (EMERGENCY)
Facility: HOSPITAL | Age: 40
LOS: 1 days | Discharge: HOME | End: 2020-03-25
Attending: STUDENT IN AN ORGANIZED HEALTH CARE EDUCATION/TRAINING PROGRAM
Payer: MEDICAID

## 2020-03-25 VITALS
DIASTOLIC BLOOD PRESSURE: 83 MMHG | HEART RATE: 56 BPM | OXYGEN SATURATION: 98 % | RESPIRATION RATE: 16 BRPM | SYSTOLIC BLOOD PRESSURE: 165 MMHG | TEMPERATURE: 98 F

## 2020-03-25 VITALS
TEMPERATURE: 98 F | DIASTOLIC BLOOD PRESSURE: 81 MMHG | HEART RATE: 60 BPM | SYSTOLIC BLOOD PRESSURE: 131 MMHG | OXYGEN SATURATION: 98 % | RESPIRATION RATE: 18 BRPM

## 2020-03-25 DIAGNOSIS — Z98.890 OTHER SPECIFIED POSTPROCEDURAL STATES: Chronic | ICD-10-CM

## 2020-03-25 LAB
ALBUMIN SERPL ELPH-MCNC: 3.8 G/DL — SIGNIFICANT CHANGE UP (ref 3.3–5)
ALP SERPL-CCNC: 138 U/L — HIGH (ref 40–120)
ALT FLD-CCNC: 40 U/L — SIGNIFICANT CHANGE UP (ref 10–45)
ANION GAP SERPL CALC-SCNC: 14 MMOL/L — SIGNIFICANT CHANGE UP (ref 5–17)
APTT BLD: 33.5 SEC — SIGNIFICANT CHANGE UP (ref 27.5–36.3)
AST SERPL-CCNC: 27 U/L — SIGNIFICANT CHANGE UP (ref 10–40)
BASE EXCESS BLDA CALC-SCNC: 0 MMOL/L — SIGNIFICANT CHANGE UP (ref -2–2)
BASOPHILS # BLD AUTO: 0.07 K/UL — SIGNIFICANT CHANGE UP (ref 0–0.2)
BASOPHILS NFR BLD AUTO: 1.4 % — SIGNIFICANT CHANGE UP (ref 0–2)
BILIRUB SERPL-MCNC: 0.2 MG/DL — SIGNIFICANT CHANGE UP (ref 0.2–1.2)
BUN SERPL-MCNC: 17 MG/DL — SIGNIFICANT CHANGE UP (ref 7–23)
CALCIUM SERPL-MCNC: 9.9 MG/DL — SIGNIFICANT CHANGE UP (ref 8.4–10.5)
CHLORIDE SERPL-SCNC: 103 MMOL/L — SIGNIFICANT CHANGE UP (ref 96–108)
CO2 BLDA-SCNC: 24 MMOL/L — SIGNIFICANT CHANGE UP (ref 22–30)
CO2 SERPL-SCNC: 24 MMOL/L — SIGNIFICANT CHANGE UP (ref 22–31)
CREAT SERPL-MCNC: 0.83 MG/DL — SIGNIFICANT CHANGE UP (ref 0.5–1.3)
EOSINOPHIL # BLD AUTO: 0.1 K/UL — SIGNIFICANT CHANGE UP (ref 0–0.5)
EOSINOPHIL NFR BLD AUTO: 2 % — SIGNIFICANT CHANGE UP (ref 0–6)
GLUCOSE SERPL-MCNC: 114 MG/DL — HIGH (ref 70–99)
HCO3 BLDA-SCNC: 23 MMOL/L — SIGNIFICANT CHANGE UP (ref 21–29)
HCT VFR BLD CALC: 37.7 % — LOW (ref 39–50)
HGB BLD-MCNC: 12.2 G/DL — LOW (ref 13–17)
IMM GRANULOCYTES NFR BLD AUTO: 0.4 % — SIGNIFICANT CHANGE UP (ref 0–1.5)
INR BLD: 1 RATIO — SIGNIFICANT CHANGE UP (ref 0.88–1.16)
LYMPHOCYTES # BLD AUTO: 1.15 K/UL — SIGNIFICANT CHANGE UP (ref 1–3.3)
LYMPHOCYTES # BLD AUTO: 22.8 % — SIGNIFICANT CHANGE UP (ref 13–44)
MCHC RBC-ENTMCNC: 29 PG — SIGNIFICANT CHANGE UP (ref 27–34)
MCHC RBC-ENTMCNC: 32.4 GM/DL — SIGNIFICANT CHANGE UP (ref 32–36)
MCV RBC AUTO: 89.8 FL — SIGNIFICANT CHANGE UP (ref 80–100)
MONOCYTES # BLD AUTO: 0.21 K/UL — SIGNIFICANT CHANGE UP (ref 0–0.9)
MONOCYTES NFR BLD AUTO: 4.2 % — SIGNIFICANT CHANGE UP (ref 2–14)
NEUTROPHILS # BLD AUTO: 3.5 K/UL — SIGNIFICANT CHANGE UP (ref 1.8–7.4)
NEUTROPHILS NFR BLD AUTO: 69.2 % — SIGNIFICANT CHANGE UP (ref 43–77)
NRBC # BLD: 0 /100 WBCS — SIGNIFICANT CHANGE UP (ref 0–0)
PCO2 BLDA: 34 MMHG — SIGNIFICANT CHANGE UP (ref 32–46)
PH BLDA: 7.44 — SIGNIFICANT CHANGE UP (ref 7.35–7.45)
PLATELET # BLD AUTO: 291 K/UL — SIGNIFICANT CHANGE UP (ref 150–400)
PO2 BLDA: 99 MMHG — SIGNIFICANT CHANGE UP (ref 74–108)
POTASSIUM SERPL-MCNC: 4.5 MMOL/L — SIGNIFICANT CHANGE UP (ref 3.5–5.3)
POTASSIUM SERPL-SCNC: 4.5 MMOL/L — SIGNIFICANT CHANGE UP (ref 3.5–5.3)
PROT SERPL-MCNC: 7.5 G/DL — SIGNIFICANT CHANGE UP (ref 6–8.3)
PROTHROM AB SERPL-ACNC: 11.4 SEC — SIGNIFICANT CHANGE UP (ref 10–12.9)
RBC # BLD: 4.2 M/UL — SIGNIFICANT CHANGE UP (ref 4.2–5.8)
RBC # FLD: 12.6 % — SIGNIFICANT CHANGE UP (ref 10.3–14.5)
SAO2 % BLDA: 98 % — HIGH (ref 92–96)
SODIUM SERPL-SCNC: 141 MMOL/L — SIGNIFICANT CHANGE UP (ref 135–145)
WBC # BLD: 5.05 K/UL — SIGNIFICANT CHANGE UP (ref 3.8–10.5)
WBC # FLD AUTO: 5.05 K/UL — SIGNIFICANT CHANGE UP (ref 3.8–10.5)

## 2020-03-25 PROCEDURE — 85610 PROTHROMBIN TIME: CPT

## 2020-03-25 PROCEDURE — 82803 BLOOD GASES ANY COMBINATION: CPT

## 2020-03-25 PROCEDURE — 70450 CT HEAD/BRAIN W/O DYE: CPT | Mod: 26

## 2020-03-25 PROCEDURE — 85027 COMPLETE CBC AUTOMATED: CPT

## 2020-03-25 PROCEDURE — 76942 ECHO GUIDE FOR BIOPSY: CPT

## 2020-03-25 PROCEDURE — 99285 EMERGENCY DEPT VISIT HI MDM: CPT

## 2020-03-25 PROCEDURE — 70450 CT HEAD/BRAIN W/O DYE: CPT

## 2020-03-25 PROCEDURE — 99284 EMERGENCY DEPT VISIT MOD MDM: CPT

## 2020-03-25 PROCEDURE — 85730 THROMBOPLASTIN TIME PARTIAL: CPT

## 2020-03-25 PROCEDURE — 80053 COMPREHEN METABOLIC PANEL: CPT

## 2020-03-25 PROCEDURE — 76942 ECHO GUIDE FOR BIOPSY: CPT | Mod: 26

## 2020-03-25 RX ORDER — ACETAMINOPHEN 500 MG
975 TABLET ORAL ONCE
Refills: 0 | Status: COMPLETED | OUTPATIENT
Start: 2020-03-25 | End: 2020-03-25

## 2020-03-25 RX ADMIN — Medication 975 MILLIGRAM(S): at 16:53

## 2020-03-25 NOTE — ED PROVIDER NOTE - ATTENDING CONTRIBUTION TO CARE
I performed a history and physical exam of the patient and discussed their management with the PA/NP.  I reviewed the ACP's note and agree with the documented findings and plan of care except as noted below. My medical decision making and observations are as follows:    41 yo male PMHx HTN, ICH 2019 w/ residual L sided hemiparesis, recent admission to [facility] for L thalamic IPH w/ vasogenic edema, MRSA bacteremia, discharged to [facility] 14 days ago presents to the ED from [facility] for R sided headache that began this morning as he was about to be discharged back home. Pt states headache is all on right sided, felt when he woke up this morning. Denies new deficits, numbness/tingling, speech/visual changes.  Pt A&O x 3, NAD, PERRL, EOMI, left sided paralysis - baseline.  Concern for ICH causing headache - will check labs, CT/CTA and reassess.

## 2020-03-25 NOTE — ED PROVIDER NOTE - NSFOLLOWUPINSTRUCTIONS_ED_ALL_ED_FT
1. Follow up with your PMD in 1-2 days.   2. Rest, stay hydrated. Continue all current home medications as previously prescribed.   3. Return to the ED immediately if you develop any new/worsening symptoms including new/worsening headache, numbness/tingling, speech/visual changes or any other concerning symptoms.

## 2020-03-25 NOTE — ED PROVIDER NOTE - ENMT, MLM
Head normocephalic, +healed surgical scar noted to R side of scalp. Airway patent, Nasal mucosa clear. Mouth with normal mucosa. Throat has no vesicles, no oropharyngeal exudates and uvula is midline.

## 2020-03-25 NOTE — ED PROVIDER NOTE - OBJECTIVE STATEMENT
41 yo male PMHx HTN, ICH 2019 w/ residual L sided hemiparesis, recent admission to [facility] for L thalamic IPH w/ vasogenic edema, MRSA bacteremia, discharged to [facility] 14 days ago presents to the ED for R sided headache that began this morning as he was about to be discharged back home. Pt states headache is all on right sided, felt when he woke up this morning. Denies new deficits, numbness/tingling, speech/visual changes, fever/chills, cough, new weakness, photophobia, cp, sob. 39 yo male PMHx HTN, ICH 2019 w/ residual L sided hemiparesis, recent admission to [facility] for L thalamic IPH w/ vasogenic edema, MRSA bacteremia, discharged to [facility] 14 days ago presents to the ED from [facility] for R sided headache that began this morning as he was about to be discharged back home. Pt states headache is all on right sided, felt when he woke up this morning. Denies new deficits, numbness/tingling, speech/visual changes, fever/chills, cough, new weakness, photophobia, cp, sob, LOC, seizure activity.

## 2020-03-25 NOTE — ED ADULT NURSE NOTE - OBJECTIVE STATEMENT
40y male with hx of ICH bibems from rehab for headache. pt is alert and oriented and speaking coherently. pt states that he began to have a right sided headache. pt has residual weakness from ICH including left sided paralysis. right sided weakness, and left sided facial droop. pt denies cp, sob, n/v/d, fevers. pt denies feeling any weaker than baseline. neuro assessment in chart. vss. pt has pressure band 40y male with hx of ICH bibems from rehab for headache. pt is alert and oriented and speaking coherently. pt states that he began to have a right sided headache. pt has residual weakness from ICH including left sided paralysis. right sided weakness, and left sided facial droop. pt denies cp, sob, n/v/d, fevers. pt denies feeling any weaker than baseline. neuro assessment in chart. vss. pt has pressure bandage to right hip. Pt repositioned, diaper clean and dry. MD herrera completed. Will reassess.

## 2020-03-25 NOTE — ED PROVIDER NOTE - PROGRESS NOTE DETAILS
CTH with no new hemorrhage, decreasing edema and unchanged cystic encephalomalacia. Discussed case w/ neurosurgery resident as pt was seen by Dr. Ward during his [facility] admission, discussed all results and CTH and unable to obtain access for CTA, questioned if warranted at this point with negative CTH, they advised no CTA warranted given no new findings and pt had CTA on March 10th. Attending Dr. Raymon car, pt cleared for d/c back to [facility]. - Ernesto Cheung PA-C Discussed with [facility] Nursing administrator who advised pt was already discharged from [facility] and was sent to go home when he was sent here and they advised pt should not go back to them but should go home. Will d/w pt, family and social work. Attending aware. - Ernesto Cheung PA-C  [facility] spoke w/ [facility] and [facility] to accept pt back up there, I was advised to arrange transport back to [facility]. Ambulance paperwork provided to  desk. - Ernesto Cheung PA-C

## 2020-03-25 NOTE — ED CLERICAL - NS ED CLERK NOTE PRE-ARRIVAL INFORMATION; ADDITIONAL PRE-ARRIVAL INFORMATION
CC/Reason For referral: headache with history of stroke and ICH. Recently in [facility] for MRSA bacteremia  Preferred Consultant(if applicable):  Who admits for you (if needed):  Do you have documents you would like to fax over? no  Would you still like to speak to an ED attending? yes

## 2021-01-06 NOTE — CONSULT NOTE ADULT - ASSESSMENT
40 year old h/o CVA with L sided weakness p/w AMS Tx from OSH, intubated for lethargy and airway protection,   CT head demonstrated small left thalamic hemorrhage with extension of blood into left lateral ventricle, no hydrocephalus      1. Repeat CTH in 6 hours, then 24 hours. No acute neurosurgical intervention at this time, no need for EVD, no hydrocephalus present on examination.   2. Neuro checks q 1 hour  3. Strict BP control, Goal SBP <160  4. Goal Na 145-150  5. Neurology consult- reported exam prior to intubation at OSH was ?seizure like activity. 40 year old h/o CVA with L sided weakness p/w AMS Tx from OSH, intubated for lethargy and airway protection,   CT head demonstrated small left thalamic hemorrhage with extension of blood into left lateral ventricle, no hydrocephalus      1. Repeat CTH now given poor neurological exam, then 24 hours. No acute neurosurgical intervention at this time, no need for EVD, no hydrocephalus present on CT however will review repeat CT once completed   2. Neuro checks q 1 hour  3. Strict BP control, Goal SBP <160  4. Goal Na 145-150  5. Neurology consult- reported exam prior to intubation at OSH was ?seizure like activity.   6. D/w Dr. Ward 06-Jan-2021 01:09

## 2022-04-08 NOTE — DISCHARGE NOTE PROVIDER - NSDCCPCAREPLAN_GEN_ALL_CORE_FT
PRINCIPAL DISCHARGE DIAGNOSIS  Diagnosis: Intracranial hemorrhage  Assessment and Plan of Treatment: 1.5 cm acute L thalamic IPH with vasogenic edema. Keppra for seizure prophylaxis. Avoid A/C for now. CT head negative for new bleed. Encephalopathy resolved. PRINCIPAL DISCHARGE DIAGNOSIS  Diagnosis: Intracranial hemorrhage  Assessment and Plan of Treatment: 1.5 cm acute L thalamic IPH with vasogenic edema. Keppra for seizure prophylaxis. Avoid A/C for now. CT head negative for new bleed. Encephalopathy resolved. Patient should follow up with outpatient Neurology for repeat MRI brain w/ & w/o contrast in 2-3 months, MRA Head w/o contrast, MRA Neck w/ contrast, and possible conventional angiogram.      SECONDARY DISCHARGE DIAGNOSES  Diagnosis: Essential hypertension  Assessment and Plan of Treatment: Continue blood pressure medications. Low sodium and fat diet, continue anti-hypertensive medications, and follow up with primary care physician.    Diagnosis: MRSA bacteremia  Assessment and Plan of Treatment: Continue vancomycin until 3/24. CBC/BMP, vanco trough once a week while on OPAT. New midline placement on 3/10/20 Siliq Counseling:  I discussed with the patient the risks of Siliq including but not limited to new or worsening depression, suicidal thoughts and behavior, immunosuppression, malignancy, posterior leukoencephalopathy syndrome, and serious infections.  The patient understands that monitoring is required including a PPD at baseline and must alert us or the primary physician if symptoms of infection or other concerning signs are noted. There is also a special program designed to monitor depression which is required with Siliq.

## 2022-05-04 NOTE — PHYSICAL THERAPY INITIAL EVALUATION ADULT - PRECAUTIONS/LIMITATIONS, REHAB EVAL
cardiac precautions/fall precautions/seizure precautions/aspiration precautions Minocycline Pregnancy And Lactation Text: This medication is Pregnancy Category D and not consider safe during pregnancy. It is also excreted in breast milk.

## 2023-08-07 NOTE — CHART NOTE - NSCHARTNOTEFT_GEN_A_CORE
Source: Patient [X]    Family [X] Brother     other [X] Medical Chat  Diet rx: Dysphagia 1 Pureed-Honey Consistency Fluid     Pt 38 yo male with HTN, ICH - per chart review. At time of visit Pt appears alert, oriented; Pt's family (brother) @ bed side. Pt with good appetite reported; but Pt needs to be fed. Of note Pt passed Swallow Bedside Assessment Adult, SLP rec: Pureed with Honey Thick (2/23). No report of no nausea, vomiting or diarrhea @ this time. No food related concerns voiced @ present. RDN remains available, Pt & his family made aware.       Enteral/Parenteral Nutrition: N/A  Pt's weight: 67.2 Kg - 2/24;         67.4 Kg - 2/18  Pertinent Medications: Miralax, Senna, Lipitor, Pepcid,   Pertinent Labs: 03-10 Na139 mmol/L Glu 88 mg/dL K+ 3.9 mmol/L Cr  0.96 mg/dL BUN 19 mg/dL 03-10 Phos 3.8 mg/dL 02-18 [id number] 4.9 %    Skin: No report of pressure injury @ present   Estimated Needs:   [X] no change since previous assessment  [ ] recalculated:     Recommendations:   1. Continue PO diet as ordered; Monitor PO diet tolerance; Honor food preferences;   2. Repeat Swallow Evaluation if warranted;   3. Monitor labs, weekly weights, hydration status; Doxycycline Counseling:  Patient counseled regarding possible photosensitivity and increased risk for sunburn.  Patient instructed to avoid sunlight, if possible.  When exposed to sunlight, patients should wear protective clothing, sunglasses, and sunscreen.  The patient was instructed to call the office immediately if the following severe adverse effects occur:  hearing changes, easy bruising/bleeding, severe headache, or vision changes.  The patient verbalized understanding of the proper use and possible adverse effects of doxycycline.  All of the patient's questions and concerns were addressed.
